# Patient Record
Sex: FEMALE | NOT HISPANIC OR LATINO | Employment: FULL TIME | URBAN - NONMETROPOLITAN AREA
[De-identification: names, ages, dates, MRNs, and addresses within clinical notes are randomized per-mention and may not be internally consistent; named-entity substitution may affect disease eponyms.]

---

## 2017-02-16 ENCOUNTER — COMMUNICATION - GICH (OUTPATIENT)
Dept: FAMILY MEDICINE | Facility: OTHER | Age: 52
End: 2017-02-16

## 2017-02-16 DIAGNOSIS — I10 ESSENTIAL (PRIMARY) HYPERTENSION: ICD-10-CM

## 2017-02-16 DIAGNOSIS — F41.1 GENERALIZED ANXIETY DISORDER: ICD-10-CM

## 2017-02-22 ENCOUNTER — COMMUNICATION - GICH (OUTPATIENT)
Dept: FAMILY MEDICINE | Facility: OTHER | Age: 52
End: 2017-02-22

## 2017-02-22 DIAGNOSIS — F17.219 CIGARETTE NICOTINE DEPENDENCE WITH NICOTINE-INDUCED DISORDER: ICD-10-CM

## 2017-05-27 ENCOUNTER — COMMUNICATION - GICH (OUTPATIENT)
Dept: FAMILY MEDICINE | Facility: OTHER | Age: 52
End: 2017-05-27

## 2017-05-27 DIAGNOSIS — F41.1 GENERALIZED ANXIETY DISORDER: ICD-10-CM

## 2017-05-27 DIAGNOSIS — I10 ESSENTIAL (PRIMARY) HYPERTENSION: ICD-10-CM

## 2017-05-30 ENCOUNTER — COMMUNICATION - GICH (OUTPATIENT)
Dept: FAMILY MEDICINE | Facility: OTHER | Age: 52
End: 2017-05-30

## 2017-05-30 DIAGNOSIS — I10 ESSENTIAL (PRIMARY) HYPERTENSION: ICD-10-CM

## 2017-06-22 ENCOUNTER — OFFICE VISIT - GICH (OUTPATIENT)
Dept: FAMILY MEDICINE | Facility: OTHER | Age: 52
End: 2017-06-22

## 2017-06-22 ENCOUNTER — HISTORY (OUTPATIENT)
Dept: FAMILY MEDICINE | Facility: OTHER | Age: 52
End: 2017-06-22

## 2017-06-22 DIAGNOSIS — R10.32 LEFT LOWER QUADRANT PAIN: ICD-10-CM

## 2017-06-22 DIAGNOSIS — Z00.00 ENCOUNTER FOR GENERAL ADULT MEDICAL EXAMINATION WITHOUT ABNORMAL FINDINGS: ICD-10-CM

## 2017-06-22 DIAGNOSIS — R10.2 PELVIC AND PERINEAL PAIN: ICD-10-CM

## 2017-06-22 DIAGNOSIS — F41.1 GENERALIZED ANXIETY DISORDER: ICD-10-CM

## 2017-06-22 DIAGNOSIS — F17.210 CIGARETTE NICOTINE DEPENDENCE, UNCOMPLICATED: ICD-10-CM

## 2017-06-22 DIAGNOSIS — I10 ESSENTIAL (PRIMARY) HYPERTENSION: ICD-10-CM

## 2017-06-22 LAB
A/G RATIO - HISTORICAL: 1.4 (ref 1–2)
ABSOLUTE BASOPHILS - HISTORICAL: 0.1 THOU/CU MM
ABSOLUTE EOSINOPHILS - HISTORICAL: 0.2 THOU/CU MM
ABSOLUTE IMMATURE GRANULOCYTES(METAS,MYELOS,PROS) - HISTORICAL: 0 THOU/CU MM
ABSOLUTE LYMPHOCYTES - HISTORICAL: 2.6 THOU/CU MM (ref 0.9–2.9)
ABSOLUTE MONOCYTES - HISTORICAL: 0.9 THOU/CU MM
ABSOLUTE NEUTROPHILS - HISTORICAL: 5.5 THOU/CU MM (ref 1.7–7)
ALBUMIN SERPL-MCNC: 4.7 G/DL (ref 3.5–5.7)
ALP SERPL-CCNC: 88 IU/L (ref 34–104)
ALT (SGPT) - HISTORICAL: 18 IU/L (ref 7–52)
ANION GAP - HISTORICAL: 8 (ref 5–18)
AST SERPL-CCNC: 16 IU/L (ref 13–39)
BASOPHILS # BLD AUTO: 0.6 %
BILIRUB SERPL-MCNC: 0.5 MG/DL (ref 0.3–1)
BUN SERPL-MCNC: 15 MG/DL (ref 7–25)
BUN/CREAT RATIO - HISTORICAL: 17
CALCIUM SERPL-MCNC: 10.3 MG/DL (ref 8.6–10.3)
CHLORIDE SERPLBLD-SCNC: 102 MMOL/L (ref 98–107)
CHOL/HDL RATIO - HISTORICAL: 4.69
CHOLESTEROL TOTAL: 239 MG/DL
CO2 SERPL-SCNC: 26 MMOL/L (ref 21–31)
CREAT SERPL-MCNC: 0.9 MG/DL (ref 0.7–1.3)
EOSINOPHIL NFR BLD AUTO: 1.9 %
ERYTHROCYTE [DISTWIDTH] IN BLOOD BY AUTOMATED COUNT: 13.2 % (ref 11.5–15.5)
GFR IF NOT AFRICAN AMERICAN - HISTORICAL: >60 ML/MIN/1.73M2
GLOBULIN - HISTORICAL: 3.3 G/DL (ref 2–3.7)
GLUCOSE SERPL-MCNC: 91 MG/DL (ref 70–105)
HCT VFR BLD AUTO: 45.6 % (ref 33–51)
HDLC SERPL-MCNC: 51 MG/DL (ref 23–92)
HEMOGLOBIN: 15.8 G/DL (ref 12–16)
IMMATURE GRANULOCYTES(METAS,MYELOS,PROS) - HISTORICAL: 0.2 %
LDLC SERPL CALC-MCNC: 163 MG/DL
LYMPHOCYTES NFR BLD AUTO: 27.9 % (ref 20–44)
MCH RBC QN AUTO: 30.7 PG (ref 26–34)
MCHC RBC AUTO-ENTMCNC: 34.6 G/DL (ref 32–36)
MCV RBC AUTO: 89 FL (ref 80–100)
MONOCYTES NFR BLD AUTO: 10.1 %
NEUTROPHILS NFR BLD AUTO: 59.3 % (ref 42–72)
NON-HDL CHOLESTEROL - HISTORICAL: 188 MG/DL
PATIENT STATUS - HISTORICAL: ABNORMAL
PLATELET # BLD AUTO: 368 THOU/CU MM (ref 140–440)
PMV BLD: 10 FL (ref 6.5–11)
POTASSIUM SERPL-SCNC: 5 MMOL/L (ref 3.5–5.1)
PROT SERPL-MCNC: 8 G/DL (ref 6.4–8.9)
RED BLOOD COUNT - HISTORICAL: 5.14 MIL/CU MM (ref 4–5.2)
SODIUM SERPL-SCNC: 136 MMOL/L (ref 133–143)
TRIGL SERPL-MCNC: 124 MG/DL
WHITE BLOOD COUNT - HISTORICAL: 9.4 THOU/CU MM (ref 4.5–11)

## 2017-06-26 ENCOUNTER — COMMUNICATION - GICH (OUTPATIENT)
Dept: SURGERY | Facility: OTHER | Age: 52
End: 2017-06-26

## 2017-06-26 DIAGNOSIS — Z12.11 ENCOUNTER FOR SCREENING FOR MALIGNANT NEOPLASM OF COLON: ICD-10-CM

## 2017-07-31 ENCOUNTER — SURGERY (OUTPATIENT)
Dept: SURGERY | Facility: OTHER | Age: 52
End: 2017-07-31

## 2017-08-21 ENCOUNTER — COMMUNICATION - GICH (OUTPATIENT)
Dept: FAMILY MEDICINE | Facility: OTHER | Age: 52
End: 2017-08-21

## 2017-08-21 DIAGNOSIS — F17.210 CIGARETTE NICOTINE DEPENDENCE, UNCOMPLICATED: ICD-10-CM

## 2017-12-28 NOTE — TELEPHONE ENCOUNTER
Patient Information     Patient Name MRJuana Lennon [id number] Female 1965      Telephone Encounter by Ellie Hamm RN at 2017  3:25 PM     Author:  Ellie Hamm RN Service:  (none) Author Type:  NURS- Registered Nurse     Filed:  2017  3:26 PM Encounter Date:  2017 Status:  Signed     :  Ellie Hamm RN (NURS- Registered Nurse)            Left message to call back  ....................Ellie Hamm RN  2017   3:26 PM

## 2017-12-28 NOTE — TELEPHONE ENCOUNTER
Patient Information     Patient Name MRJuana Lennon [id number] Female 1965      Telephone Encounter by Ellie Hamm RN at 2017  3:54 PM     Author:  Ellie Hamm RN Service:  (none) Author Type:  NURS- Registered Nurse     Filed:  2017  3:56 PM Encounter Date:  2017 Status:  Signed     :  Ellie Hamm RN (NURS- Registered Nurse)            Attempted to reach patient multiple times without a response. Refills denied until patient calls or seen.    Unable to complete prescription refill per RN Medication Refill Policy.................... Ellie Hamm RN ....................  2017   3:55 PM

## 2017-12-28 NOTE — TELEPHONE ENCOUNTER
Patient Information     Patient Name MRJuana Lennon [id number] Female 1965      Telephone Encounter by Lizbeth Eller RN at 2017  7:55 AM     Author:  Lizbeth Eller RN Service:  (none) Author Type:  NURS- Registered Nurse     Filed:  2017  7:58 AM Encounter Date:  2017 Status:  Signed     :  Lizbeth Eller RN (NURS- Registered Nurse)            This is a Refill request from: mike  Name of Medication: CHANTIX STARTING MONTH BOX 0.5 mg (11)- 1 mg (42) tablet  TAKE AS DIRECTED  Quantity requested: 53  Last fill date: 17  Due for refill: yes will need RX for continuing pack  Last visit with JOSEFINA CASTILLO was on: 2017 in [facility]  PCP:  Josefina Castillo MD  Controlled Substance Agreement:  na   Diagnosis r/t this medication request: smoking cessation    Will need rx for chantix continuing pack T-up for provider     Unable to complete prescription refill per RN Medication Refill Policy.................... LIZBETH ELLER RN ....................  2017   7:55 AM

## 2017-12-28 NOTE — PROGRESS NOTES
Patient Information     Patient Name MRJuana Lennon [id number] Female 1965      Progress Notes by Josefina Castillo MD at 2017  8:45 AM     Author:  Josefina Castillo MD Service:  (none) Author Type:  Physician     Filed:  2017  8:58 AM Encounter Date:  2017 Status:  Signed     :  Josefina Castillo MD (Physician)            Nursing Notes:   Winnie Cochran  2017 10:03 AM  Signed  Patient presents for yearly physical and med renewal had quit smoking on chantix and when her brother got sick she started back again. She would like a renewal of chantix.   Winnie Cochran LPN........................2017  8:47 AM       SUBJECTIVE:    Juana Bains is a 52 y.o. female who presents for annual physical examination.     HPI: Patient is a   5Para  3 Here for annual GYN examination No LMP recorded. Patient has had a hysterectomy..   She had pelvic pain last year but did not keep her ultrasound appointment.  She continues to have pelvic pain intermittently with bloating.    She is in a monogamous relationship.  Declines sexually transmitted disease testing.  No vaginal complaints.  No breast complaints.   No family history of breast or ovarian cancer.      HYPERTENSION   on Zestoretic 20/25 mg.    ROS: denies any changes in hearing/ vision;   Denies chest pain, palpitations, shortness of breath or dyspnea on exertion.  Denies any pedal edema.               Generalized anxiety disorder    On Cymbalta 60 mg a day    CARMINA-7 Anxiety Screening     Over the last 2 weeks, how often have you been bothered by the following problems:             PHQ Depression Screen  Date of PHQ exam: 17  Over the last 2 weeks, how often have you been bothered by any of the following problems?  1. Little interest or pleasure in doing things: 0 - Not at all  2. Feeling down, depressed, or hopeless: 0 - Not at all               Cigarette nicotine  dependence without complication  patient quit smoking on Chantix but then her brother  and stress caused her to resume nicotine use. She is not in smoking cessation.       HCM  Patient has family history of Abernathy's polyposis;  She has tested negative for the gene.     Denies changes in bowel or bladder or indigestion.    She had colonoscopy  which was normal. She denies any blood in stools.    Wt Readings from Last 3 Encounters:    17 71.2 kg (157 lb)   16 74.7 kg (164 lb 9.6 oz)   16 73.4 kg (161 lb 12.8 oz)          ALLERGIES:  Morphine and Paxil [paroxetine]     Immunization History:  Immunization History     Administered  Date(s) Administered     Tdap 2009        CURRENT MEDICATIONS:   Current Outpatient Prescriptions       Medication  Sig Dispense Refill     DULoxetine (CYMBALTA) 60 mg Delayed-release capsule TAKE 1 CAPSULE BY MOUTH ONCE DAILY 90 capsule 0     lisinopril-hydrochlorothiazide, 20-25 mg, (PRINZIDE, ZESTORETIC) 20-25 mg per tablet TAKE 1 TABLET BY MOUTH ONCE DAILY 90 tablet 0     No current facility-administered medications for this visit.      Medications have been reviewed by me and are current to the best of my knowledge and ability.    PROBLEM LIST:  Patient Active Problem List     Diagnosis  Code     HYPERKERATOSIS CALLUS L84     HYPERTENSION I10     NICOTINE ADDICTION F17.200     BUNIONS, BILATERAL M21.619     Chronic tension headaches G44.229     Generalized anxiety disorder F41.1       PAST MEDICAL HISTORY:  Past Medical History:     Diagnosis  Date     Depression     Depression/Anxiety diagnosed, PHQ- 9 score on 08 - PHQ-9 score is 2.  Declines counseling.        Dermatitis     Dermatitis of the ear canals      External otitis      H/O abnormal Pap smear     Previous abnormal Paps      Hallux valgus, acquired, bilateral     Bilateral hallux valgus      Hx of pregnancy     2 C-Sections/G5, P2, 2 ectopic and 1 miscarriage      SURGICAL  HISTORY:  Past Surgical History:      Procedure  Laterality Date      SECTION      2 C-Sections, Two ectopic pregnancies; with one the left tube removed       COLONOSCOPY SCREENING       within normal limits.         ENDOMETRIAL ABLATION      Status post ablation.        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 Carter        TOTAL ABDOMINAL HYSTERECTOMY  2009    Total abdominal hysterectomy by Dr. Guzman       TUBAL LIGATION      Tubal ligation       US PELVIS COMPLETE TA      Last pelvic ultrasound       History     Smoking Status       Current Every Day Smoker      Packs/day: 0.50     Years: 15.00     Types: Cigarettes   Smokeless Tobacco       Never Used        SOCIAL HISTORY:  Social History     Social History        Marital status:       Spouse name: N/A     Number of children:  N/A     Years of education:  N/A     Occupational History      Not on file.     Social History Main Topics         Smoking status:   Current Every Day Smoker     Packs/day:  0.50     Years:  15.00     Types:  Cigarettes     Smokeless tobacco:   Never Used     Alcohol use   No      Comment: rare      Drug use:   No     Sexual activity:   Not on file     Other Topics   Concern      Service  No     Blood Transfusions  Yes     remote with ectopic pregnancy       Caffeine Concern  No     Occupational Exposure  No     Hobby Hazards  No     Sleep Concern  No     Stress Concern  Yes     brother, children, grandchildren       Weight Concern  No     Special Diet  No     Back Care  No     Exercise  Yes     working on it      Seat Belt  Yes     100%      Self-Exams  No     Social History Narrative     She is remarried.  New 's name is Cecil Farrell.    She is a .      Children Age 14 and 10    Patient currently smokes.     one daughter pregnant             Preload - 2012     FAMILY HISTORY:  Family History      "  Problem   Relation Age of Onset     Other  Other      Family history of Abernathy's polyposis syndrome, negative genetic testing~Father Abernathy's polyposis syndrome with father dying early of colon cancer.   at age 41 of Abernathy's polyposis with resulting colon cancer.~Brother Two brothers with surgery fo*       Diabetes  Maternal Grandmother      FHMaternal diabetes in the family       Cancer  Father      Abernathy's polyposis syndrome with father dying early of colon cancer.   at age 41 of Abernathy's polyposis with resulting colon cancer       Cancer  Brother      Two brothers with surgery for the above and positive genetic testing.  One has had almost the entire colon removed.       Cancer  Sister      Negative for carrying the genetic marker of Abernathy's polyposis.       Other  Brother      Younger brother has not been tested       Good Health  Mother      Other  Brother      One with no symptoms         REVIEW OF SYSTEMS:    ROS: No TIA's or dysphagia. No prolonged cough. No dyspnea or chest pain on exertion.  No abdominal pain, change in bowel habits, black or bloody stools.  No urinary tract symptoms. She is post hysterectomy. No abnormal vaginal bleeding, discharge or unexpected pelvic pain. No new breast lumps, breast pain or nipple discharge.      EXAM:   /78  Pulse 80  Ht 1.57 m (5' 1.81\")  Wt 71.2 kg (157 lb)  Breastfeeding? No  BMI 28.89 kg/m2       General Appearance: Normal., Pleasant, alert, appropriate appearance for age. No acute distress,  Psych-alert, oriented, and appropriate affect  HEENT-within normal limits   Neck Exam: Normal., Supple, no masses or nodes.  Thyroid Exam: No nodules or enlargement., normal to palpation  Lungs:  Clear to auscultation.  Cardiovascular Exam: Regular rate and rhythm. S1, S2, no murmur, click, gallop, or rubs.  Breast Exam: Normal., No dimpling, bilateral chronic nipple retraction or discharge. No masses or nodes. Self breast exam reviewed and " taught   Gastrointestinal Exam: Soft, nontender, no abnormal masses or organomegaly.    Genitourinary Exam Female:   External Genitalia: normal hair distribution, EG/BUS  Vaginal exam: normal pink mucosa without prolapse or lesions   BME: normal size uterus, no adnexal masses.  Skin: no concerning moles, rashes, or lesions  Extremities:  NT, no edema       Results for orders placed or performed in visit on 06/22/17      LIPID PANEL      Result  Value Ref Range    CHOLESTEROL,TOTAL 239 (H) <200 mg/dL    TRIGLYCERIDES 124 <150 mg/dL    HDL CHOLESTEROL 51 23 - 92 mg/dL    NON-HDL CHOLESTEROL 188 (H) <145 mg/dl    CHOL/HDL RATIO            4.69 (H) <4.50                    LDL CHOLESTEROL 163 (H) <100 mg/dL    PATIENT STATUS            FASTING                   COMP METABOLIC PANEL      Result  Value Ref Range    SODIUM 136 133 - 143 mmol/L    POTASSIUM 5.0 3.5 - 5.1 mmol/L    CHLORIDE 102 98 - 107 mmol/L    CO2,TOTAL 26 21 - 31 mmol/L    ANION GAP 8 5 - 18                    GLUCOSE 91 70 - 105 mg/dL    CALCIUM 10.3 8.6 - 10.3 mg/dL    BUN 15 7 - 25 mg/dL    CREATININE 0.90 0.70 - 1.30 mg/dL    BUN/CREAT RATIO           17                    GFR if African American >60 >60 ml/min/1.73m2    GFR if not African American >60 >60 ml/min/1.73m2    ALBUMIN 4.7 3.5 - 5.7 g/dL    PROTEIN,TOTAL 8.0 6.4 - 8.9 g/dL    GLOBULIN                  3.3 2.0 - 3.7 g/dL    A/G RATIO 1.4 1.0 - 2.0                    BILIRUBIN,TOTAL 0.5 0.3 - 1.0 mg/dL    ALK PHOSPHATASE 88 34 - 104 IU/L    ALT (SGPT) 18 7 - 52 IU/L    AST (SGOT) 16 13 - 39 IU/L   CBC WITH AUTO DIFFERENTIAL      Result  Value Ref Range    WHITE BLOOD COUNT         9.4 4.5 - 11.0 thou/cu mm    RED BLOOD COUNT           5.14 4.00 - 5.20 mil/cu mm    HEMOGLOBIN                15.8 12.0 - 16.0 g/dL    HEMATOCRIT                45.6 33.0 - 51.0 %    MCV                       89 80 - 100 fL    MCH                       30.7 26.0 - 34.0 pg    MCHC                      34.6 32.0 -  36.0 g/dL    RDW                       13.2 11.5 - 15.5 %    PLATELET COUNT            368 140 - 440 thou/cu mm    MPV                       10.0 6.5 - 11.0 fL    NEUTROPHILS               59.3 42.0 - 72.0 %    LYMPHOCYTES               27.9 20.0 - 44.0 %    MONOCYTES                 10.1 <12.0 %    EOSINOPHILS               1.9 <8.0 %    BASOPHILS                 0.6 <3.0 %    IMMATURE GRANULOCYTES(METAS,MYELOS,PROS) 0.2 %    ABSOLUTE NEUTROPHILS      5.5 1.7 - 7.0 thou/cu mm    ABSOLUTE LYMPHOCYTES      2.6 0.9 - 2.9 thou/cu mm    ABSOLUTE MONOCYTES        0.9 (H) <0.9 thou/cu mm    ABSOLUTE EOSINOPHILS      0.2 <0.5 thou/cu mm    ABSOLUTE BASOPHILS        0.1 <0.3 thou/cu mm    ABSOLUTE IMMATURE GRANULOCYTES(METAS,MYELOS,PROS) 0.0 <=0.3 thou/cu mm         ASSESSMENT/PLAN    ICD-10-CM    1. Routine physical examination Z00.00 XR MAMMO BILAT SCREENING      COLONOSCOPY SCREENING   2. HYPERTENSION I10 lisinopril-hydrochlorothiazide, 20-25 mg, (PRINZIDE, ZESTORETIC) 20-25 mg per tablet      LIPID PANEL      COMP METABOLIC PANEL      CBC AND DIFFERENTIAL      LIPID PANEL      COMP METABOLIC PANEL      CBC AND DIFFERENTIAL      CBC WITH AUTO DIFFERENTIAL   3. Generalized anxiety disorder F41.1 DULoxetine (CYMBALTA) 60 mg Delayed-release capsule   4. Cigarette nicotine dependence without complication F17.210 varenicline (CHANTIX) 0.5 mg (11)- 1 mg (42) tablet   5. Pelvic pain R10.2 US PELVIS COMPLETE TA AND TV   6. Intermittent left lower quadrant abdominal pain R10.32 COLONOSCOPY SCREENING     Proceed with ultrasound  Discussed with patient increased morbidity and mortality / death associated with nicotine use.   Reviewed a variety of OTC nicotine replacement products available, identifying triggers and developing a strategy for successful nicotine cessation.     Check blood pressure twice a month;  Goal under 130/80  Ms. Herson's Body mass index is 28.89 kg/(m^2). This is out of the normal range for a 52 y.o.  Normal range for ages 18+ is between 18.5 and 24.9. To lose weight we reviewed risks and benefits of appropriate options such as diet, exercise, and medications. Patient's strategy will be  self-directed nutrition plan and self-directed exercise program    BMI reviewed and discussed with patient.      Patient Instructions   Labs will be placed in your [employer] account  Work on following  a mediterranean diet; Use monosaturated fats ( olive , canola and peanut   oil; nuts, avocados), abundance of plant foods which are minimally processed, fish (salmon).  Exercise 30 minutes every day  GI staff will call you with your mammogram appointment.   Try to get 7-8 hours sleep each night.  Rest is important!      Recommend return to clinic to discuss use of statin as patient could not wait for results    chantix Rx provided       Work on smoking cessation. Handouts as below. Identify triggers to smoking.  Consider over-the-counter nicotine patch or reading Toñito Georges's , ' Easy way to Stop Smoking'.        Blood pressure med refilled

## 2017-12-28 NOTE — TELEPHONE ENCOUNTER
Patient Information     Patient Name MRN Juana Pierson [id number] Female 1965      Telephone Encounter by Fay Andres at 2017 10:36 AM     Author:  Fay Andres Service:  (none) Author Type:  (none)     Filed:  2017 10:42 AM Encounter Date:  2017 Status:  Signed     :  Fay Andres            Screening Questions for the Scheduling of Screening Colonoscopies   (If Colonoscopy is diagnostic, Provider should review the chart before scheduling.)  Are you younger than 50 or older than 80?  NO   Do you take aspirin or fish oil?  NO (if yes, tell patient to stop 1 week prior to Colonoscopy)  Do you take warfarin (Coumadin), clopidogrel (Plavix), apixaban (Eliquis), dabigatram (Pradaxa), rivaroxaban (Xarelto) or any blood thinner? NO  Do you use oxygen at home?  NO  Do you have kidney disease? NO  Are you on dialysis? NO  Have you had a stroke or heart attack in the last year? NO  Have you had a stent in your heart or any blood vessel in the last year? NO  Have you had a transplant of any organ? NO  Have you had a colonoscopy or upper endoscopy (EGD) before? YES          When?   ?  -  GI   Date of scheduled Colonoscopy. 2017  Provider JAVON CARPIO

## 2017-12-28 NOTE — PATIENT INSTRUCTIONS
Patient Information     Patient Name MRJuana Lennon [id number] Female 1965      Patient Instructions by Josefina Castillo MD at 2017  8:45 AM     Author:  Josefina Castillo MD Service:  (none) Author Type:  Physician     Filed:  2017  8:52 AM Encounter Date:  2017 Status:  Signed     :  Josefina Castillo MD (Physician)            Labs will be placed in your [employer] account  Work on following  a mediterranean diet; Use monosaturated fats ( olive , canola and peanut   oil; nuts, avocados), abundance of plant foods which are minimally processed, fish (salmon).  Exercise 30 minutes every day  [facility] staff will call you with your mammogram appointment.   Try to get 7-8 hours sleep each night.  Rest is important!      Recommend return to clinic to discuss use of statin as patient could not wait for results    chantix Rx provided       Work on smoking cessation. Handouts as below. Identify triggers to smoking.  Consider over-the-counter nicotine patch or reading Toñito Georges's , ' Easy way to Stop Smoking'.        Blood pressure med refilled

## 2017-12-30 NOTE — NURSING NOTE
Patient Information     Patient Name Juana Espinoza [id number] Female 1965      Nursing Note by Winnie Cochran at 2017  8:45 AM     Author:  Winnie Cochran Service:  (none) Author Type:  (none)     Filed:  2017 10:03 AM Encounter Date:  2017 Status:  Signed     :  Winnie Cochran            Patient presents for yearly physical and med renewal had quit smoking on chantix and when her brother got sick she started back again. She would like a renewal of chantix.   Winnie Cochran LPN........................2017  8:47 AM

## 2018-01-03 NOTE — TELEPHONE ENCOUNTER
Patient Information     Patient Name MRJuana Lennon [id number] Female 1965      Telephone Encounter by Chloe Hou at 2017  8:47 AM     Author:  Chloe Hou Service:  (none) Author Type:  NURS- Registered Nurse     Filed:  2017  8:52 AM Encounter Date:  2017 Status:  Signed     :  Chloe Hou (NURS- Registered Nurse)            Depression-in adults 18 and over  Serotonin/Norepinephrine Reuptake Inhibitors    Office visit in the past 12 months or as indicated in chart.  Should have clinic visit 1-2 months after initial prescription.    Last visit with SHELBI GREEN was on: 2016 in [facility]  Next visit with SHELBI GREEN is on: No future appointment listed with this provider  Next visit with Family Practice is on: No future appointment listed in this department    Max refills 12 months from last office visit or per providers notes.    PHQ Depression Screening 2016   Date of PHQ exam (doc flow) 2016   1. Lack of interest/pleasure 0 - Not at all   2. Feeling down/depressed 0 - Not at all   PHQ-2 TOTAL SCORE 0   3. Trouble sleeping 0 - Not at all   4. Decreased energy 1 - Several days   5. Appetite change 0 - Not at all   6. Feelings of failure 0 - Not at all   7. Trouble concentrating 0 - Not at all   8. Activity level 0 - Not at all   9. Hurting yourself 0 - Not at all   PHQ-9 TOTAL SCORE 1   PHQ-9 Severity Level none   Functional Impairment not difficult at all       Diuretic Combinations    Office visit in the past 12 months or per provider note.    Last visit with SHELBI GREEN was on: 2016 in [facility]  Next visit with SHELBI GREEN is on: No future appointment listed with this provider  Next visit with Family Practice is on: No future appointment listed in this department    Lab test requirements:  Creatinine and Potassium annually, if ordering lab, order  BMP.  CREATININE (mg/dL)    Date Value   02/23/2016 0.79     POTASSIUM (mmol/L)    Date Value   02/23/2016 4.0       Max refill for 12 months from last office visit or per provider note.    Patient is due for medication management appointment. Limited refill provided at this time and letter sent for reminder to patient. Prescription refilled per RN Medication Refill Policy.................... Chloe Hou RN ....................  2/16/2017   8:49 AM

## 2018-01-03 NOTE — TELEPHONE ENCOUNTER
Patient Information     Patient Name MRJuana Lennon [id number] Female 1965      Telephone Encounter by Ellie Hamm RN at 2017  4:23 PM     Author:  Ellie Hamm RN Service:  (none) Author Type:  (none)     Filed:  2017  4:24 PM Encounter Date:  2017 Status:  Signed     :  Ellie Hamm RN (NURS- Registered Nurse)            Smoking Cessation    Office visit in the past 12 months or per provider note.    Last visit with SHELBI GREEN was on: 2016 in [facility]  Next visit with SHELBI GREEN is on: No future appointment listed with this provider  Next visit with Family Practice is on: No future appointment listed in this department    If off med entirely, refer to provider.  Max refill for 24 weeks from last office visit or per provider note.    Medication is not currently on list - patient is due for annual medication review.    Unable to complete prescription refill per RN Medication Refill Policy.................... Ellie Hamm ....................  2017   4:24 PM

## 2018-01-18 PROBLEM — F17.200 NICOTINE ADDICTION: Status: ACTIVE | Noted: 2018-01-18

## 2018-01-18 PROBLEM — L84 CORNS AND CALLOSITIES: Status: ACTIVE | Noted: 2018-01-18

## 2018-01-18 PROBLEM — I10 HYPERTENSION: Status: ACTIVE | Noted: 2018-01-18

## 2018-01-18 RX ORDER — DULOXETIN HYDROCHLORIDE 60 MG/1
60 CAPSULE, DELAYED RELEASE ORAL DAILY
COMMUNITY
Start: 2017-06-22 | End: 2018-07-15

## 2018-01-18 RX ORDER — VARENICLINE TARTRATE 1 MG/1
1 TABLET, FILM COATED ORAL
COMMUNITY
Start: 2017-08-23 | End: 2018-10-29

## 2018-01-18 RX ORDER — POLYETHYLENE GLYCOL 3350, SODIUM CHLORIDE, SODIUM BICARBONATE, POTASSIUM CHLORIDE 420; 11.2; 5.72; 1.48 G/4L; G/4L; G/4L; G/4L
240 POWDER, FOR SOLUTION ORAL
COMMUNITY
Start: 2017-06-26 | End: 2018-10-29

## 2018-01-18 RX ORDER — LISINOPRIL AND HYDROCHLOROTHIAZIDE 20; 25 MG/1; MG/1
1 TABLET ORAL DAILY
COMMUNITY
Start: 2017-06-22 | End: 2018-08-03

## 2018-01-27 VITALS
WEIGHT: 157 LBS | HEART RATE: 80 BPM | HEIGHT: 62 IN | BODY MASS INDEX: 28.89 KG/M2 | SYSTOLIC BLOOD PRESSURE: 120 MMHG | DIASTOLIC BLOOD PRESSURE: 78 MMHG

## 2018-02-06 ENCOUNTER — DOCUMENTATION ONLY (OUTPATIENT)
Dept: FAMILY MEDICINE | Facility: OTHER | Age: 53
End: 2018-02-06

## 2018-03-24 ENCOUNTER — HEALTH MAINTENANCE LETTER (OUTPATIENT)
Age: 53
End: 2018-03-24

## 2018-06-16 ENCOUNTER — OFFICE VISIT (OUTPATIENT)
Dept: FAMILY MEDICINE | Facility: OTHER | Age: 53
End: 2018-06-16
Attending: PHYSICIAN ASSISTANT
Payer: COMMERCIAL

## 2018-06-16 VITALS
HEART RATE: 104 BPM | WEIGHT: 164 LBS | BODY MASS INDEX: 28 KG/M2 | HEIGHT: 64 IN | DIASTOLIC BLOOD PRESSURE: 78 MMHG | TEMPERATURE: 97.8 F | SYSTOLIC BLOOD PRESSURE: 144 MMHG

## 2018-06-16 DIAGNOSIS — J30.2 ACUTE SEASONAL ALLERGIC RHINITIS, UNSPECIFIED TRIGGER: ICD-10-CM

## 2018-06-16 DIAGNOSIS — J01.90 ACUTE SINUSITIS WITH SYMPTOMS > 10 DAYS: Primary | ICD-10-CM

## 2018-06-16 PROCEDURE — 99213 OFFICE O/P EST LOW 20 MIN: CPT | Performed by: PHYSICIAN ASSISTANT

## 2018-06-16 RX ORDER — FLUTICASONE PROPIONATE 50 MCG
2 SPRAY, SUSPENSION (ML) NASAL DAILY
Qty: 1 BOTTLE | Refills: 0 | Status: SHIPPED | OUTPATIENT
Start: 2018-06-16 | End: 2019-12-03

## 2018-06-16 ASSESSMENT — ENCOUNTER SYMPTOMS
EYE REDNESS: 0
NAUSEA: 0
EYE ITCHING: 0
EYE PAIN: 0
APPETITE CHANGE: 0
FATIGUE: 1
VOMITING: 0
ACTIVITY CHANGE: 0
SORE THROAT: 0
CHILLS: 0
FEVER: 0
RHINORRHEA: 0
SINUS PAIN: 1
COUGH: 1
SINUS PRESSURE: 1

## 2018-06-16 NOTE — PROGRESS NOTES
SUBJECTIVE:   Juana Farrell is a 52 year old female presenting with a chief complaint of   Chief Complaint   Patient presents with     Sinus Problem     Nursing Notes:   Darshana Mercado CMA  6/16/2018  3:51 PM  Signed  Patient present to clinic today with a possible sinus infection. Facial pain and pressure. Post nasal drainage, roof of mouth pressure/pain. Bad taste in mouth. Sx x 2 weeks.  Darshana Mercado CMA..............6/16/2018........3:36 PM      HPI:  Juana is here with complaints of worsening sinus pain and pressure for 2 weeks. She has had one day when it seemed to be getting better but then it returned with worse symptoms again.    She has pressure in the ethmoid and maxillary sinuses and in the roof of the mouth.  She had some aching tooth pressure. She has postnasal drip with resultant cough. No fevers.  She has been taking Claritin D as she does have seasonal allergies and this has been a particularly bad year for her in this regard. She also takes Tylenol every day in the last 2 weeks for her sinus headache.      Review of Systems   Constitutional: Positive for fatigue. Negative for activity change, appetite change, chills and fever.   HENT: Positive for congestion, ear discharge, ear pain, postnasal drip, sinus pain and sinus pressure. Negative for dental problem, rhinorrhea, sneezing and sore throat.         Her ears are itchy and she sometimes has white drainage on her pillow in the morning. She has not been swimming and tried not to get water in her ears when showering. No pain in the ears.    Eyes: Negative for pain, redness and itching.   Respiratory: Positive for cough.         Minimal cough from post nasal drip.   Cardiovascular: Negative for chest pain.   Gastrointestinal: Negative for nausea and vomiting.       Past Medical History:   Diagnosis Date     Acquired hallux valgus of left foot     Bilateral hallux valgus     Dermatitis     Dermatitis of the ear canals     Major  "depressive disorder, single episode     ,Depression/Anxiety diagnosed, PHQ- 9 score on 08 - PHQ-9 score is 2.  Declines counseling.     Otitis externa     No Comments Provided     Personal history of other medical treatment (CODE)     2 C-Sections/G5, P2, 2 ectopic and 1 miscarriage     Personal history of other specified conditions (CODE)     Previous abnormal Paps     Family History   Problem Relation Age of Onset     CANCER Father      Cancer,Abernathy's polyposis syndrome with father dying early of colon cancer.   at age 41 of Abernathy's polyposis with resulting colon cancer     Family History Negative Mother      Good Health     Other - See Comments Other FHx     Family history of Abernathy's polyposis syndrome, negative genetic testing     DIABETES Maternal Grandmother      Diabetes,FHMaternal diabetes in the family     CANCER Brother      Cancer,Two brothers with surgery for the above and positive genetic testing.  One has had almost the entire colon removed.     CANCER Sister      Cancer,Negative for carrying the genetic marker of Abernathy's polyposis.     Other - See Comments Brother      Younger brother has not been tested     Other - See Comments Brother      One with no symptoms     Medications:  cymbalta  Lisinopril with HCTZ    Allergies   Allergen Reactions     Morphine Hives     Itchy rash at time of birth     Paroxetine Nausea       Social History   Substance Use Topics     Smoking status: Current Every Day Smoker     Packs/day: 0.50     Years: 15.00     Types: Cigarettes     Smokeless tobacco: Never Used     Alcohol use No      Comment: Alcoholic Drinks/day: rare   She is decreasing smoking, down to 1/2 pack per day, down from a pack a day.    She has a Chantix prescription ready and is going to try it again. It did work.           OBJECTIVE  /78  Pulse 104  Temp 97.8  F (36.6  C) (Tympanic)  Ht 5' 3.5\" (1.613 m)  Wt 164 lb (74.4 kg)  BMI 28.6 kg/m2    Physical Exam "   Constitutional: She appears well-developed and well-nourished. No distress.   HENT:   Right Ear: External ear normal.   Left Ear: External ear normal.   Mouth/Throat: Oropharynx is clear and moist.   TMs intact and gray. Nose congested.  Tender maxillary and ethmoid sinuses.  Dentition good.   Eyes: Conjunctivae and EOM are normal. Pupils are equal, round, and reactive to light. Right eye exhibits no discharge. Left eye exhibits no discharge.   Neck: Normal range of motion.   Cardiovascular: Normal rate, regular rhythm and normal heart sounds.    No murmur heard.  Pulmonary/Chest: Effort normal and breath sounds normal. No respiratory distress.   Lymphadenopathy:     She has no cervical adenopathy.       Labs:  No results found for this or any previous visit (from the past 24 hour(s)).      ASSESSMENT:      ICD-10-CM    1. Acute sinusitis with symptoms > 10 days J01.90 amoxicillin-clavulanate (AUGMENTIN) 875-125 MG per tablet     fluticasone (FLONASE) 50 MCG/ACT spray   2. Acute seasonal allergic rhinitis, unspecified trigger J30.2         PLAN:  Due to worsening symptoms that have persisted for more than 2 week, suspect bacterial infection. Will treat with Augmentin.  Increase oral fluids. Can try Mucinex. Recommended taking plain claritin without the decongestant. Add flonase.  Try steam treatments or saline rinse.  Follow up if this persists more than an additional week, or sooner if much worse/ high fever/ etc.  We did discuss smoking cessation and she plans to work on this again.     María Carey PA-C on 6/16/2018 at 5:49 PM          Patient Instructions     Try hydrocortisone cream 1% (over the counter) in the ear canal for the itching.  Treating your allergies with Flonase or oral antihistamine (claritin) may also help with the ear itching and drainage.        Acute Bacterial Rhinosinusitis (ABRS)    Acute bacterial rhinosinusitis (ABRS) is an infection of your nasal cavity and sinuses. It s caused by  bacteria. Acute means that you ve had symptoms for less than 4 weeks, but possibly up to 12 weeks.  Understanding your sinuses  The nasal cavity is the large air-filled space behind your nose. The sinuses are a group of spaces formed by the bones of your face. They connect with your nasal cavity. ABRS causes the tissue lining these spaces to become inflamed. Mucus may not drain normally. This leads to facial pain and other symptoms.  What causes ABRS?  ABRS most often follows an upper respiratory infection caused by a virus. Bacteria then infect the lining of your nasal cavity and sinuses. But you can also get ABRS if you have:    Nasal allergies    Long-term nasal swelling and congestion not caused by allergies    Blockage in the nose  Symptoms of ABRS  The symptoms of ABRS may be different for each person and include:    Nasal congestion or blockage    Pain or pressure in the face    Thick, colored drainage from the nose  Other symptoms may include:    Runny nose    Fluid draining from the nose down the throat (postnasal drip)    Headache    Cough    Pain    Fever  Diagnosing ABRS  ABRS may be diagnosed if you ve had an upper respiratory infection like a cold and cough for 10 or more days without improvement or with worsening symptoms. Your healthcare provider will ask about your symptoms and your medical history. The provider will check your vital signs, including your temperature. You ll have a physical exam. The healthcare provider will check your ears, nose, and throat. You likely won t need any tests. If ABRS comes back, you may have a culture or other tests.  Treatment for ABRS  Treatment may include:    Antibiotic medicine. This is for symptoms that last for at least 10 to 14 days.    Nasal corticosteroid medicine. Drops or spray used in the nose can lessen swelling and congestion.    Over-the-counter pain medicine. This is to lessen sinus pain and pressure.    Nasal decongestant medicine. Spray or drops  may help to lessen congestion. Do not use them for more than a few days.    Salt wash (saline irrigation). This can help to loosen mucus.  Possible complications of ABRS  ABRS may come back or become long-term (chronic). In rare cases, ABRS may cause complications such as:     Inflamed tissue around the brain and spinal cord (meningitis)    Inflamed tissue around the eyes (orbital cellulitis)    Inflamed bones around the sinuses (osteitis)  These problems may need to be treated in a hospital with intravenous (IV) antibiotic medicine or surgery.  When to call the healthcare provider  Call your healthcare provider if you have any of the following:    Symptoms that don t get better, or get worse    Symptoms that don t get better after 3 to 5 days on antibiotics    Trouble seeing    Swelling around your eyes    Confusion or trouble staying awake   Date Last Reviewed: 5/1/2017 2000-2017 The [employer]. [address] [place] [id number]. All rights reserved. This information is not intended as a substitute for professional medical care. Always follow your healthcare professional's instructions.

## 2018-06-16 NOTE — PATIENT INSTRUCTIONS
Try hydrocortisone cream 1% (over the counter) in the ear canal for the itching.  Treating your allergies with Flonase or oral antihistamine (claritin) may also help with the ear itching and drainage.        Acute Bacterial Rhinosinusitis (ABRS)    Acute bacterial rhinosinusitis (ABRS) is an infection of your nasal cavity and sinuses. It s caused by bacteria. Acute means that you ve had symptoms for less than 4 weeks, but possibly up to 12 weeks.  Understanding your sinuses  The nasal cavity is the large air-filled space behind your nose. The sinuses are a group of spaces formed by the bones of your face. They connect with your nasal cavity. ABRS causes the tissue lining these spaces to become inflamed. Mucus may not drain normally. This leads to facial pain and other symptoms.  What causes ABRS?  ABRS most often follows an upper respiratory infection caused by a virus. Bacteria then infect the lining of your nasal cavity and sinuses. But you can also get ABRS if you have:    Nasal allergies    Long-term nasal swelling and congestion not caused by allergies    Blockage in the nose  Symptoms of ABRS  The symptoms of ABRS may be different for each person and include:    Nasal congestion or blockage    Pain or pressure in the face    Thick, colored drainage from the nose  Other symptoms may include:    Runny nose    Fluid draining from the nose down the throat (postnasal drip)    Headache    Cough    Pain    Fever  Diagnosing ABRS  ABRS may be diagnosed if you ve had an upper respiratory infection like a cold and cough for 10 or more days without improvement or with worsening symptoms. Your healthcare provider will ask about your symptoms and your medical history. The provider will check your vital signs, including your temperature. You ll have a physical exam. The healthcare provider will check your ears, nose, and throat. You likely won t need any tests. If ABRS comes back, you may have a culture or other  tests.  Treatment for ABRS  Treatment may include:    Antibiotic medicine. This is for symptoms that last for at least 10 to 14 days.    Nasal corticosteroid medicine. Drops or spray used in the nose can lessen swelling and congestion.    Over-the-counter pain medicine. This is to lessen sinus pain and pressure.    Nasal decongestant medicine. Spray or drops may help to lessen congestion. Do not use them for more than a few days.    Salt wash (saline irrigation). This can help to loosen mucus.  Possible complications of ABRS  ABRS may come back or become long-term (chronic). In rare cases, ABRS may cause complications such as:     Inflamed tissue around the brain and spinal cord (meningitis)    Inflamed tissue around the eyes (orbital cellulitis)    Inflamed bones around the sinuses (osteitis)  These problems may need to be treated in a hospital with intravenous (IV) antibiotic medicine or surgery.  When to call the healthcare provider  Call your healthcare provider if you have any of the following:    Symptoms that don t get better, or get worse    Symptoms that don t get better after 3 to 5 days on antibiotics    Trouble seeing    Swelling around your eyes    Confusion or trouble staying awake   Date Last Reviewed: 5/1/2017 2000-2017 [employer]. [address] [place] [address], [place] [address]. All rights reserved. This information is not intended as a substitute for professional medical care. Always follow your healthcare professional's instructions.

## 2018-06-16 NOTE — NURSING NOTE
Patient present to clinic today with a possible sinus infection. Facial pain and pressure. Post nasal drainage, roof of mouth pressure/pain. Bad taste in mouth. Sx x 2 weeks.  Darshana Mercado CMA..............6/16/2018........3:36 PM

## 2018-06-16 NOTE — MR AVS SNAPSHOT
After Visit Summary   6/16/2018    Juana Farrell    MRN: [id number]           Patient Information     Date Of Birth          1965        Visit Information        Provider Department      6/16/2018 3:15 PM María Carey PA-C [facility]        Today's Diagnoses     Acute sinusitis with symptoms > 10 days    -  1    Acute seasonal allergic rhinitis, unspecified trigger          Care Instructions    Try hydrocortisone cream 1% (over the counter) in the ear canal for the itching.  Treating your allergies with Flonase or oral antihistamine (claritin) may also help with the ear itching and drainage.        Acute Bacterial Rhinosinusitis (ABRS)    Acute bacterial rhinosinusitis (ABRS) is an infection of your nasal cavity and sinuses. It s caused by bacteria. Acute means that you ve had symptoms for less than 4 weeks, but possibly up to 12 weeks.  Understanding your sinuses  The nasal cavity is the large air-filled space behind your nose. The sinuses are a group of spaces formed by the bones of your face. They connect with your nasal cavity. ABRS causes the tissue lining these spaces to become inflamed. Mucus may not drain normally. This leads to facial pain and other symptoms.  What causes ABRS?  ABRS most often follows an upper respiratory infection caused by a virus. Bacteria then infect the lining of your nasal cavity and sinuses. But you can also get ABRS if you have:    Nasal allergies    Long-term nasal swelling and congestion not caused by allergies    Blockage in the nose  Symptoms of ABRS  The symptoms of ABRS may be different for each person and include:    Nasal congestion or blockage    Pain or pressure in the face    Thick, colored drainage from the nose  Other symptoms may include:    Runny nose    Fluid draining from the nose down the throat (postnasal drip)    Headache    Cough    Pain    Fever  Diagnosing ABRS  ABRS may be diagnosed if you ve had an  upper respiratory infection like a cold and cough for 10 or more days without improvement or with worsening symptoms. Your healthcare provider will ask about your symptoms and your medical history. The provider will check your vital signs, including your temperature. You ll have a physical exam. The healthcare provider will check your ears, nose, and throat. You likely won t need any tests. If ABRS comes back, you may have a culture or other tests.  Treatment for ABRS  Treatment may include:    Antibiotic medicine. This is for symptoms that last for at least 10 to 14 days.    Nasal corticosteroid medicine. Drops or spray used in the nose can lessen swelling and congestion.    Over-the-counter pain medicine. This is to lessen sinus pain and pressure.    Nasal decongestant medicine. Spray or drops may help to lessen congestion. Do not use them for more than a few days.    Salt wash (saline irrigation). This can help to loosen mucus.  Possible complications of ABRS  ABRS may come back or become long-term (chronic). In rare cases, ABRS may cause complications such as:     Inflamed tissue around the brain and spinal cord (meningitis)    Inflamed tissue around the eyes (orbital cellulitis)    Inflamed bones around the sinuses (osteitis)  These problems may need to be treated in a hospital with intravenous (IV) antibiotic medicine or surgery.  When to call the healthcare provider  Call your healthcare provider if you have any of the following:    Symptoms that don t get better, or get worse    Symptoms that don t get better after 3 to 5 days on antibiotics    Trouble seeing    Swelling around your eyes    Confusion or trouble staying awake   Date Last Reviewed: 5/1/2017 2000-2017 [employer]. [address] [place] [address], [place] [address]. All rights reserved. This information is not intended as a substitute for professional medical care. Always follow your healthcare professional's instructions.                 "Follow-ups after your visit        Who to contact     If you have questions or need follow up information about today's clinic visit or your schedule please contact [facility] directly at [phone].  Normal or non-critical lab and imaging results will be communicated to you by [employer], letter or phone within 4 business days after the clinic has received the results. If you do not hear from us within 7 days, please contact the clinic through [employer] or phone. If you have a critical or abnormal lab result, we will notify you by phone as soon as possible.  Submit refill requests through [employer] or call your pharmacy and they will forward the refill request to us. Please allow 3 business days for your refill to be completed.          Additional Information About Your Visit        [employer] Information     [employer] gives you secure access to your electronic health record. If you see a primary care provider, you can also send messages to your care team and make appointments. If you have questions, please call your primary care clinic.  If you do not have a primary care provider, please call [phone] and they will assist you.        Care EveryWhere ID     This is your Care EveryWhere ID. This could be used by other organizations to access your [facility] medical records  [id number]        Your Vitals Were     Pulse Temperature Height BMI (Body Mass Index)          104 97.8  F (36.6  C) (Tympanic) 5' 3.5\" (1.613 m) 28.6 kg/m2         Blood Pressure from Last 3 Encounters:   06/16/18 144/78   06/22/17 120/78   05/23/16 141/82    Weight from Last 3 Encounters:   06/16/18 164 lb (74.4 kg)   06/22/17 157 lb (71.2 kg)   05/23/16 164 lb 9.6 oz (74.7 kg)              Today, you had the following     No orders found for display         Today's Medication Changes          These changes are accurate as of 6/16/18  4:06 PM.  If you have any questions, ask your nurse or doctor.               Start taking these " medicines.        Dose/Directions    amoxicillin-clavulanate 875-125 MG per tablet   Commonly known as:  AUGMENTIN   Used for:  Acute sinusitis with symptoms > 10 days   Started by:  María Carey PA-C        Dose:  1 tablet   Take 1 tablet by mouth 2 times daily   Quantity:  20 tablet   Refills:  0       fluticasone 50 MCG/ACT spray   Commonly known as:  FLONASE   Used for:  Acute sinusitis with symptoms > 10 days   Started by:  María Carey PA-C        Dose:  2 spray   Spray 2 sprays into both nostrils daily   Quantity:  1 Bottle   Refills:  0            Where to get your medicines      These medications were sent to [id number] - [place], [place] - [address]  [address], [facility] [address]     Phone:  [phone]     amoxicillin-clavulanate 875-125 MG per tablet    fluticasone 50 MCG/ACT spray                Primary Care Provider Office Phone # Fax #    Sanam Jessica Rivers [phone]       [address] [facility] [address]        Equal Access to Services     [facility]: Hadii aurelia bonilla hadbrittney Sodestiny, waaxda luqadaha, qaybta kaalmalori blanchard, gowdin barrios . So [facility] [place] [phone].    ATENCIÓN: Si habla español, tiene a dejesus disposición servicios gratmichaelos de asistencia lingüística. [facility] [phone].    We comply with applicable federal civil rights laws and [place] laws. We do not discriminate on the basis of race, color, national origin, age, disability, sex, sexual orientation, or gender identity.            Thank you!     Thank you for choosing [facility]  for your care. Our goal is always to provide you with excellent care. Hearing back from our patients is one way we can continue to improve our services. Please take a few minutes to complete the written survey that you may receive in the mail after your visit with us. Thank you!             Your Updated  Medication List - Protect others around you: Learn how to safely use, store and throw away your medicines at www.disposemymeds.org.          This list is accurate as of 6/16/18  4:06 PM.  Always use your most recent med list.                   Brand Name Dispense Instructions for use Diagnosis    amoxicillin-clavulanate 875-125 MG per tablet    AUGMENTIN    20 tablet    Take 1 tablet by mouth 2 times daily    Acute sinusitis with symptoms > 10 days       DULoxetine 60 MG EC capsule    CYMBALTA     Take 60 mg by mouth daily        fluticasone 50 MCG/ACT spray    FLONASE    1 Bottle    Spray 2 sprays into both nostrils daily    Acute sinusitis with symptoms > 10 days       lisinopril-hydrochlorothiazide 20-25 MG per tablet    PRINZIDE/ZESTORETIC     Take 1 tablet by mouth daily        polyethylene glycol-electrolytes 420 g solution    NULYTELY     Take 240 mLs by mouth Take 240 mL by mouth every 10 minutes.        * varenicline 0.5 MG X 11 & 1 MG X 42 tablet    CHANTIX PASTOR     Take one 0.5mg tab daily for 3 days, then one 0.5mg tab twice daily for 4 days, then one 1mg tab twice daily.        * varenicline 1 MG tablet    CHANTIX     Take 1 mg by mouth Take 1 mg by mouth 2 times daily with meals.        * Notice:  This list has 2 medication(s) that are the same as other medications prescribed for you. Read the directions carefully, and ask your doctor or other care provider to review them with you.

## 2018-07-13 DIAGNOSIS — J01.90 ACUTE SINUSITIS WITH SYMPTOMS > 10 DAYS: ICD-10-CM

## 2018-07-17 RX ORDER — FLUTICASONE PROPIONATE 50 MCG
SPRAY, SUSPENSION (ML) NASAL
Qty: 16 ML | Refills: 0 | OUTPATIENT
Start: 2018-07-17

## 2018-07-17 NOTE — TELEPHONE ENCOUNTER
Medication filled per [facility] provider.  Pt needs to be seen if symptoms have persisted.  Unable to complete prescription refill per RN Medication Refill Policy.................... Kylie Heard ....................  7/17/2018   11:05 AM

## 2018-07-23 NOTE — PROGRESS NOTES
Patient Information     Patient Name  Juana Bains MRN  [id number] Sex  Female   1965      Letter by Josefina Castillo MD at      Author:  Josefina Castillo MD Service:  (none) Author Type:  (none)    Filed:   Encounter Date:  2017 Status:  (Other)           Juana Bains  [address] [facility] [address]          2017    Dear Ms. Bians:    Following are the tests completed during your last clinic visit.  The results of these tests are normal and require no further attention unless otherwise noted.  Your cholesterol level was up with the elevated LDL or bad cholesterol 163. This makes your cholesterol/ HDL ratio unfavorable at 4.69. Given your history of hypertension, nicotine use I would recommend that you have a cholesterol-lowering medication as well as following a Mediterranean type diet. Please make an appointment at your convenience to discuss different types of cholesterol lowering medications and office visit.      Results for orders placed or performed in visit on 17      LIPID PANEL      Result  Value Ref Range    CHOLESTEROL,TOTAL 239 (H) <200 mg/dL    TRIGLYCERIDES 124 <150 mg/dL    HDL CHOLESTEROL 51 23 - 92 mg/dL    NON-HDL CHOLESTEROL 188 (H) <145 mg/dl    CHOL/HDL RATIO            4.69 (H) <4.50                    LDL CHOLESTEROL 163 (H) <100 mg/dL    PATIENT STATUS            FASTING                   COMP METABOLIC PANEL      Result  Value Ref Range    SODIUM 136 133 - 143 mmol/L    POTASSIUM 5.0 3.5 - 5.1 mmol/L    CHLORIDE 102 98 - 107 mmol/L    CO2,TOTAL 26 21 - 31 mmol/L    ANION GAP 8 5 - 18                    GLUCOSE 91 70 - 105 mg/dL    CALCIUM 10.3 8.6 - 10.3 mg/dL    BUN 15 7 - 25 mg/dL    CREATININE 0.90 0.70 - 1.30 mg/dL    BUN/CREAT RATIO           17                    GFR if African American >60 >60 ml/min/1.73m2    GFR if not African American >60 >60 ml/min/1.73m2    ALBUMIN 4.7 3.5 - 5.7 g/dL     PROTEIN,TOTAL 8.0 6.4 - 8.9 g/dL    GLOBULIN                  3.3 2.0 - 3.7 g/dL    A/G RATIO 1.4 1.0 - 2.0                    BILIRUBIN,TOTAL 0.5 0.3 - 1.0 mg/dL    ALK PHOSPHATASE 88 34 - 104 IU/L    ALT (SGPT) 18 7 - 52 IU/L    AST (SGOT) 16 13 - 39 IU/L   CBC WITH AUTO DIFFERENTIAL      Result  Value Ref Range    WHITE BLOOD COUNT         9.4 4.5 - 11.0 thou/cu mm    RED BLOOD COUNT           5.14 4.00 - 5.20 mil/cu mm    HEMOGLOBIN                15.8 12.0 - 16.0 g/dL    HEMATOCRIT                45.6 33.0 - 51.0 %    MCV                       89 80 - 100 fL    MCH                       30.7 26.0 - 34.0 pg    MCHC                      34.6 32.0 - 36.0 g/dL    RDW                       13.2 11.5 - 15.5 %    PLATELET COUNT            368 140 - 440 thou/cu mm    MPV                       10.0 6.5 - 11.0 fL    NEUTROPHILS               59.3 42.0 - 72.0 %    LYMPHOCYTES               27.9 20.0 - 44.0 %    MONOCYTES                 10.1 <12.0 %    EOSINOPHILS               1.9 <8.0 %    BASOPHILS                 0.6 <3.0 %    IMMATURE GRANULOCYTES(METAS,MYELOS,PROS) 0.2 %    ABSOLUTE NEUTROPHILS      5.5 1.7 - 7.0 thou/cu mm    ABSOLUTE LYMPHOCYTES      2.6 0.9 - 2.9 thou/cu mm    ABSOLUTE MONOCYTES        0.9 (H) <0.9 thou/cu mm    ABSOLUTE EOSINOPHILS      0.2 <0.5 thou/cu mm    ABSOLUTE BASOPHILS        0.1 <0.3 thou/cu mm    ABSOLUTE IMMATURE GRANULOCYTES(METAS,MYELOS,PROS) 0.0 <=0.3 thou/cu mm         If you have any further questions or problems contact my office.  I trust this finds you in good health and spirits.      Sincerely,         Electronically signed by Josefina Castillo MD

## 2018-08-03 DIAGNOSIS — I10 ESSENTIAL HYPERTENSION: Primary | ICD-10-CM

## 2018-08-03 NOTE — LETTER
August 7, 2018      Juana Farrell  [address] [facility] [address]        Dear MsKenia Mariealberto,    Your pharmacy has requested a refill of Prinzide.  This medication request has been addressed.     According to our records, you are overdue for annual medication management and labs with Dr. Sanam Rivers. Your last comprehensive visit was on 6/22/2017.     Your health is very important to us. Please contact our scheduling line at [phone] to set up this appointment at your earliest convenience, and before your next medication refills are needed.     Thank you for choosing [facility] and [facility] for your health care needs.     Sincerely,        The Refill Nurses  [facility]

## 2018-08-07 RX ORDER — LISINOPRIL AND HYDROCHLOROTHIAZIDE 20; 25 MG/1; MG/1
TABLET ORAL
Qty: 90 TABLET | Refills: 0 | Status: SHIPPED | OUTPATIENT
Start: 2018-08-07 | End: 2018-10-29

## 2018-08-07 NOTE — TELEPHONE ENCOUNTER
Patient is now due for annual medication management and labs. Reminder letter sent, and magi refill given.   Suzie Soto RN on 8/7/2018 at 2:51 PM

## 2018-10-29 ENCOUNTER — OFFICE VISIT (OUTPATIENT)
Dept: FAMILY MEDICINE | Facility: OTHER | Age: 53
End: 2018-10-29
Attending: FAMILY MEDICINE
Payer: COMMERCIAL

## 2018-10-29 VITALS
BODY MASS INDEX: 28.65 KG/M2 | TEMPERATURE: 96.6 F | HEIGHT: 64 IN | SYSTOLIC BLOOD PRESSURE: 138 MMHG | DIASTOLIC BLOOD PRESSURE: 90 MMHG | HEART RATE: 64 BPM | WEIGHT: 167.8 LBS

## 2018-10-29 DIAGNOSIS — H92.13 EAR DRAINAGE, BILATERAL: ICD-10-CM

## 2018-10-29 DIAGNOSIS — I10 ESSENTIAL HYPERTENSION: ICD-10-CM

## 2018-10-29 DIAGNOSIS — F41.1 GENERALIZED ANXIETY DISORDER: ICD-10-CM

## 2018-10-29 DIAGNOSIS — Z12.31 ENCOUNTER FOR SCREENING MAMMOGRAM FOR BREAST CANCER: ICD-10-CM

## 2018-10-29 DIAGNOSIS — Z00.00 HEALTH MAINTENANCE EXAMINATION: Primary | ICD-10-CM

## 2018-10-29 PROBLEM — L84 CORNS AND CALLOSITIES: Status: RESOLVED | Noted: 2018-01-18 | Resolved: 2018-10-29

## 2018-10-29 LAB
ALBUMIN SERPL-MCNC: 4.4 G/DL (ref 3.5–5.7)
ALP SERPL-CCNC: 90 U/L (ref 34–104)
ALT SERPL W P-5'-P-CCNC: 20 U/L (ref 7–52)
ANION GAP SERPL CALCULATED.3IONS-SCNC: 8 MMOL/L (ref 3–14)
AST SERPL W P-5'-P-CCNC: 16 U/L (ref 13–39)
BILIRUB SERPL-MCNC: 0.4 MG/DL (ref 0.3–1)
BUN SERPL-MCNC: 12 MG/DL (ref 7–25)
CALCIUM SERPL-MCNC: 9.7 MG/DL (ref 8.6–10.3)
CHLORIDE SERPL-SCNC: 101 MMOL/L (ref 98–107)
CHOLEST SERPL-MCNC: 238 MG/DL
CO2 SERPL-SCNC: 27 MMOL/L (ref 21–31)
CREAT SERPL-MCNC: 0.79 MG/DL (ref 0.6–1.2)
ERYTHROCYTE [DISTWIDTH] IN BLOOD BY AUTOMATED COUNT: 13 % (ref 10–15)
GFR SERPL CREATININE-BSD FRML MDRD: 76 ML/MIN/1.7M2
GLUCOSE SERPL-MCNC: 83 MG/DL (ref 70–105)
HCT VFR BLD AUTO: 44.3 % (ref 35–47)
HDLC SERPL-MCNC: 47 MG/DL (ref 23–92)
HGB BLD-MCNC: 15 G/DL (ref 11.7–15.7)
LDLC SERPL CALC-MCNC: 159 MG/DL
MCH RBC QN AUTO: 29.7 PG (ref 26.5–33)
MCHC RBC AUTO-ENTMCNC: 33.9 G/DL (ref 31.5–36.5)
MCV RBC AUTO: 88 FL (ref 78–100)
NONHDLC SERPL-MCNC: 191 MG/DL
PLATELET # BLD AUTO: 336 10E9/L (ref 150–450)
POTASSIUM SERPL-SCNC: 3.6 MMOL/L (ref 3.5–5.1)
PROT SERPL-MCNC: 8 G/DL (ref 6.4–8.9)
RBC # BLD AUTO: 5.05 10E12/L (ref 3.8–5.2)
SODIUM SERPL-SCNC: 136 MMOL/L (ref 134–144)
TRIGL SERPL-MCNC: 160 MG/DL
WBC # BLD AUTO: 8.4 10E9/L (ref 4–11)

## 2018-10-29 PROCEDURE — 36415 COLL VENOUS BLD VENIPUNCTURE: CPT | Performed by: FAMILY MEDICINE

## 2018-10-29 PROCEDURE — 80061 LIPID PANEL: CPT | Performed by: FAMILY MEDICINE

## 2018-10-29 PROCEDURE — 80053 COMPREHEN METABOLIC PANEL: CPT | Performed by: FAMILY MEDICINE

## 2018-10-29 PROCEDURE — 99396 PREV VISIT EST AGE 40-64: CPT | Performed by: FAMILY MEDICINE

## 2018-10-29 PROCEDURE — 85027 COMPLETE CBC AUTOMATED: CPT | Performed by: FAMILY MEDICINE

## 2018-10-29 RX ORDER — NEOMYCIN SULFATE, POLYMYXIN B SULFATE AND HYDROCORTISONE 10; 3.5; 1 MG/ML; MG/ML; [USP'U]/ML
4 SUSPENSION/ DROPS AURICULAR (OTIC) 3 TIMES DAILY
Qty: 10 ML | Refills: 4 | Status: SHIPPED | OUTPATIENT
Start: 2018-10-29 | End: 2020-01-17

## 2018-10-29 RX ORDER — LISINOPRIL AND HYDROCHLOROTHIAZIDE 20; 25 MG/1; MG/1
1 TABLET ORAL DAILY
Qty: 90 TABLET | Refills: 4 | Status: SHIPPED | OUTPATIENT
Start: 2018-10-29 | End: 2019-12-06

## 2018-10-29 RX ORDER — DULOXETIN HYDROCHLORIDE 60 MG/1
CAPSULE, DELAYED RELEASE ORAL
Qty: 90 CAPSULE | Refills: 4 | Status: SHIPPED | OUTPATIENT
Start: 2018-10-29 | End: 2019-12-06

## 2018-10-29 ASSESSMENT — ENCOUNTER SYMPTOMS: PSYCHIATRIC NEGATIVE: 1

## 2018-10-29 NOTE — MR AVS SNAPSHOT
After Visit Summary   10/29/2018    Juana Farrell    MRN: [id number]           Patient Information     Date Of Birth          1965        Visit Information        Provider Department      10/29/2018 2:00 PM Sanam Rivers MD [facility]        Today's Diagnoses     Health maintenance examination    -  1    Generalized anxiety disorder        Essential hypertension        Encounter for screening mammogram for breast cancer        Ear drainage, bilateral          Care Instructions      Prevention Guidelines, Women Ages 50 to 64  Screening tests and vaccines are an important part of managing your health. A screening test is done to find possible disorders or diseases in people who don't have any symptoms. The goal is to find a disease early so lifestyle changes can be made and you can be watched more closely to reduce the risk of disease, or to detect it early enough to treat it most effectively. Screening tests are not considered diagnostic, but are used to determine if more testing is needed. Health counseling is essential, too. Below are guidelines for these, for women ages 50 to 64. Talk with your healthcare provider to make sure you re up to date on what you need.  Screening Who needs it How often   Type 2 diabetes or prediabetes All women beginning at age 45 and women without symptoms at any age who are overweight or obese and have 1 or more additional risk factors for diabetes. At  least every 3 years   Type 2 diabetes or prediabetes All women diagnosed with gestational diabetes Lifelong testing every 3 years   Type 2 diabetes All women with prediabetes Every year   Alcohol misuse All women in this age group At routine exams   Blood pressure All women in this age group Yearly checkup if your blood pressure is normal  Normal blood pressure is less than 120/80 mm Hg  If your blood pressure reading is higher than normal, follow the advice of your healthcare  provider   Breast cancer All women at average risk in this age group Yearly mammogram should be done until age 54. At age 55, switch to mammograms every other year or choose to continue yearly mammograms.  All women should be familiar with the potential benefits and risks of breast cancer screening with mammograms.      Cervical cancer All women in this age group, except women who have had a complete hysterectomy Pap test every 3 years or Pap test with human papillomavirus (HPV) test every 5 years   Chlamydia Women at increased risk for infection At routine exams   Colorectal cancer All women in this age group Flexible sigmoidoscopy every 5 years, or colonoscopy every 10 years, or double-contrast barium enema every 5 years; yearly fecal occult blood test or fecal immunochemical test; or a stool DNA test as often as your health care provider advises; talk with your health care provider about which tests are best for you   Depression All women in this age group At routine exams   Gonorrhea Sexually active women at increased risk for infection At routine exams   Hepatitis C Anyone at increased risk; 1 time for those born between 1945 and 1965 At routine exams   High cholesterol or triglycerides All women in this age group who are at risk for coronary artery disease At least every 5 years   HIV All women At routine exams   Lung cancer Adults age 55 to 80 who have smoked Yearly screening in smokers with 30 pack-year history of smoking or who quit within 15 years   Obesity All women in this age group At routine exams   Osteoporosis Women who are postmenopausal Ask your healthcare provider   Syphilis Women at increased risk for infection - talk with your healthcare provider At routine exams   Tuberculosis Women at increased risk for infection - talk with your healthcare provider Ask your healthcare provider   Vision All women in this age group Ask your healthcare provider   Vaccine Who needs it How often   Chickenpox  (varicella) All women in this age group who have no record of this infection or vaccine 2 doses; the second dose should be given at least 4 weeks after the first dose   Hepatitis A Women at increased risk for infection - talk with your healthcare provider 2 doses given at least 6 months apart   Hepatitis B Women at increased risk for infection - talk with your healthcare provider 3 doses over 6 months; second dose should be given 1 month after the first dose; the third dose should be given at least 2 months after the second dose and at least 4 months after the first dose   Haemophilus influenzaeType B (HIB) Women at increased risk for infection - talk with your healthcare provider 1 to 3 doses   Influenza (flu) All women in this age group Once a year   Measles, mumps, rubella (MMR) Women in this age group through their late 50s who have no record of these infections or vaccines 1 dose   Meningococcal Women at increased risk for infection - talk with your healthcare provider 1 or more doses   Pneumococcal conjugate vaccine (PCV13) and pneumococcal polysaccharide vaccine (PPSV23) Women at increased risk for infection - talk with your healthcare provider PCV13: 1 dose ages 19 to 65 (protects against 13 types of pneumococcal bacteria)  PPSV23: 1 to 2 doses through age 64, or 1 dose at 65 or older (protects against 23 types of pneumococcal bacteria)   Tetanus/diphtheria/pertussis (Td/Tdap) booster All women in this age group Td every 10 years, or a one-time dose of Tdap instead of a Td booster after age 18, then Td every 10 years   Zoster All women ages 60 and older 1 dose   Counseling Who needs it How often   BRCA gene mutation testing for breast and ovarian cancer susceptibility Women with increased risk for having gene mutation When your risk is known   Breast cancer and chemoprevention Women at high risk for breast cancer When your risk is known   Diet and exercise Women who are overweight or obese When diagnosed,  and then at routine exams   Sexually transmitted infection prevention Women at increased risk for infection - talk with your healthcare provider At routine exams   Use of daily aspirin Women ages 55 and up in this age group who are at risk for cardiovascular health problems such as stroke When your risk is known   Use of tobacco and the health effects it can cause All women in this age group Every exam   1American Cancer Society  Date Last Reviewed: 1/26/2016 2000-2017 [employer]. [address] [place] [address], [place] [address]. All rights reserved. This information is not intended as a substitute for professional medical care. Always follow your healthcare professional's instructions.                Follow-ups after your visit        Future tests that were ordered for you today     Open Future Orders        Priority Expected Expires Ordered    MA Screening Digital Bilateral Routine  10/29/2019 10/29/2018            Who to contact     If you have questions or need follow up information about today's clinic visit or your schedule please contact [facility] directly at [phone].  Normal or non-critical lab and imaging results will be communicated to you by [employer]point Systemshart, letter or phone within 4 business days after the clinic has received the results. If you do not hear from us within 7 days, please contact the clinic through [employer] or phone. If you have a critical or abnormal lab result, we will notify you by phone as soon as possible.  Submit refill requests through judo or call your pharmacy and they will forward the refill request to us. Please allow 3 business days for your refill to be completed.          Additional Information About Your Visit        Certpoint Systemshart Information     judo gives you secure access to your electronic health record. If you see a primary care provider, you can also send messages to your care team and make appointments. If you have questions, please call  "your primary care clinic.  If you do not have a primary care provider, please call [phone] and they will assist you.        Care EveryWhere ID     This is your Care EveryWhere ID. This could be used by other organizations to access your [facility] medical records  [id number]        Your Vitals Were     Pulse Temperature Height Breastfeeding? BMI (Body Mass Index)       64 96.6  F (35.9  C) 5' 3.5\" (1.613 m) No 29.26 kg/m2        Blood Pressure from Last 3 Encounters:   10/29/18 138/90   06/16/18 144/78   06/22/17 120/78    Weight from Last 3 Encounters:   10/29/18 167 lb 12.8 oz (76.1 kg)   06/16/18 164 lb (74.4 kg)   06/22/17 157 lb (71.2 kg)              We Performed the Following     CBC W PLT No Diff     Comprehensive Metabolic Panel     Lipid Panel          Today's Medication Changes          These changes are accurate as of 10/29/18  2:40 PM.  If you have any questions, ask your nurse or doctor.               Start taking these medicines.        Dose/Directions    neomycin-polymyxin-hydrocortisone 3.5-26269-5 otic suspension   Commonly known as:  CORTISPORIN   Used for:  Ear drainage, bilateral   Started by:  Sanam Rivers MD        Dose:  4 drop   Place 4 drops into both ears 3 times daily   Quantity:  10 mL   Refills:  4         These medicines have changed or have updated prescriptions.        Dose/Directions    lisinopril-hydrochlorothiazide 20-25 MG per tablet   Commonly known as:  PRINZIDE/ZESTORETIC   This may have changed:  See the new instructions.   Used for:  Essential hypertension   Changed by:  Sanam Rivers MD        Dose:  1 tablet   Take 1 tablet by mouth daily   Quantity:  90 tablet   Refills:  4            Where to get your medicines      These medications were sent to [id number] - [place], [place] - [address] [facility] [facility] [address]     Phone:  [phone]     DULoxetine 60 MG EC capsule    " lisinopril-hydrochlorothiazide 20-25 MG per tablet    neomycin-polymyxin-hydrocortisone 3.5-68006-9 otic suspension                Primary Care Provider Office Phone # Fax #    Sanam Rivers [phone] [address]   [facility] [address]        Equal Access to Services     OLGA HIGHTOWER : Hadii aad ku hadasho Soomaali, waaxda luqadaha, qaybta kaalmada adeegyada, godwin mccloud haykt bonnerdarrylclint barrios . So [facility] [phone].    ATENCIÓN: Si habla español, tiene a dejesus disposición servicios gratuitos de asistencia lingüística. Llame al [phone].    We comply with applicable federal civil rights laws and [place] laws. We do not discriminate on the basis of race, color, national origin, age, disability, sex, sexual orientation, or gender identity.            Thank you!     Thank you for choosing [facility]  for your care. Our goal is always to provide you with excellent care. Hearing back from our patients is one way we can continue to improve our services. Please take a few minutes to complete the written survey that you may receive in the mail after your visit with us. Thank you!             Your Updated Medication List - Protect others around you: Learn how to safely use, store and throw away your medicines at www.disposemymeds.org.          This list is accurate as of 10/29/18  2:40 PM.  Always use your most recent med list.                   Brand Name Dispense Instructions for use Diagnosis    DULoxetine 60 MG EC capsule    CYMBALTA    90 capsule    TAKE 1 CAPSULE BY MOUTH ONCE DAILY    Generalized anxiety disorder       fluticasone 50 MCG/ACT spray    FLONASE    1 Bottle    Spray 2 sprays into both nostrils daily    Acute sinusitis with symptoms > 10 days       lisinopril-hydrochlorothiazide 20-25 MG per tablet    PRINZIDE/ZESTORETIC    90 tablet    Take 1 tablet by mouth daily    Essential hypertension       neomycin-polymyxin-hydrocortisone  3.5-30159-2 otic suspension    CORTISPORIN    10 mL    Place 4 drops into both ears 3 times daily    Ear drainage, bilateral

## 2018-10-29 NOTE — PATIENT INSTRUCTIONS
Prevention Guidelines, Women Ages 50 to 64  Screening tests and vaccines are an important part of managing your health. A screening test is done to find possible disorders or diseases in people who don't have any symptoms. The goal is to find a disease early so lifestyle changes can be made and you can be watched more closely to reduce the risk of disease, or to detect it early enough to treat it most effectively. Screening tests are not considered diagnostic, but are used to determine if more testing is needed. Health counseling is essential, too. Below are guidelines for these, for women ages 50 to 64. Talk with your healthcare provider to make sure you re up to date on what you need.  Screening Who needs it How often   Type 2 diabetes or prediabetes All women beginning at age 45 and women without symptoms at any age who are overweight or obese and have 1 or more additional risk factors for diabetes. At  least every 3 years   Type 2 diabetes or prediabetes All women diagnosed with gestational diabetes Lifelong testing every 3 years   Type 2 diabetes All women with prediabetes Every year   Alcohol misuse All women in this age group At routine exams   Blood pressure All women in this age group Yearly checkup if your blood pressure is normal  Normal blood pressure is less than 120/80 mm Hg  If your blood pressure reading is higher than normal, follow the advice of your healthcare provider   Breast cancer All women at average risk in this age group Yearly mammogram should be done until age 54. At age 55, switch to mammograms every other year or choose to continue yearly mammograms.  All women should be familiar with the potential benefits and risks of breast cancer screening with mammograms.      Cervical cancer All women in this age group, except women who have had a complete hysterectomy Pap test every 3 years or Pap test with human papillomavirus (HPV) test every 5 years   Chlamydia Women at increased risk for  infection At routine exams   Colorectal cancer All women in this age group Flexible sigmoidoscopy every 5 years, or colonoscopy every 10 years, or double-contrast barium enema every 5 years; yearly fecal occult blood test or fecal immunochemical test; or a stool DNA test as often as your health care provider advises; talk with your health care provider about which tests are best for you   Depression All women in this age group At routine exams   Gonorrhea Sexually active women at increased risk for infection At routine exams   Hepatitis C Anyone at increased risk; 1 time for those born between 1945 and 1965 At routine exams   High cholesterol or triglycerides All women in this age group who are at risk for coronary artery disease At least every 5 years   HIV All women At routine exams   Lung cancer Adults age 55 to 80 who have smoked Yearly screening in smokers with 30 pack-year history of smoking or who quit within 15 years   Obesity All women in this age group At routine exams   Osteoporosis Women who are postmenopausal Ask your healthcare provider   Syphilis Women at increased risk for infection - talk with your healthcare provider At routine exams   Tuberculosis Women at increased risk for infection - talk with your healthcare provider Ask your healthcare provider   Vision All women in this age group Ask your healthcare provider   Vaccine Who needs it How often   Chickenpox (varicella) All women in this age group who have no record of this infection or vaccine 2 doses; the second dose should be given at least 4 weeks after the first dose   Hepatitis A Women at increased risk for infection - talk with your healthcare provider 2 doses given at least 6 months apart   Hepatitis B Women at increased risk for infection - talk with your healthcare provider 3 doses over 6 months; second dose should be given 1 month after the first dose; the third dose should be given at least 2 months after the second dose and at least  4 months after the first dose   Haemophilus influenzaeType B (HIB) Women at increased risk for infection - talk with your healthcare provider 1 to 3 doses   Influenza (flu) All women in this age group Once a year   Measles, mumps, rubella (MMR) Women in this age group through their late 50s who have no record of these infections or vaccines 1 dose   Meningococcal Women at increased risk for infection - talk with your healthcare provider 1 or more doses   Pneumococcal conjugate vaccine (PCV13) and pneumococcal polysaccharide vaccine (PPSV23) Women at increased risk for infection - talk with your healthcare provider PCV13: 1 dose ages 19 to 65 (protects against 13 types of pneumococcal bacteria)  PPSV23: 1 to 2 doses through age 64, or 1 dose at 65 or older (protects against 23 types of pneumococcal bacteria)   Tetanus/diphtheria/pertussis (Td/Tdap) booster All women in this age group Td every 10 years, or a one-time dose of Tdap instead of a Td booster after age 18, then Td every 10 years   Zoster All women ages 60 and older 1 dose   Counseling Who needs it How often   BRCA gene mutation testing for breast and ovarian cancer susceptibility Women with increased risk for having gene mutation When your risk is known   Breast cancer and chemoprevention Women at high risk for breast cancer When your risk is known   Diet and exercise Women who are overweight or obese When diagnosed, and then at routine exams   Sexually transmitted infection prevention Women at increased risk for infection - talk with your healthcare provider At routine exams   Use of daily aspirin Women ages 55 and up in this age group who are at risk for cardiovascular health problems such as stroke When your risk is known   Use of tobacco and the health effects it can cause All women in this age group Every exam   1American Cancer Society  Date Last Reviewed: 1/26/2016 2000-2017 [employer]. [address] [place] [address]. All  rights reserved. This information is not intended as a substitute for professional medical care. Always follow your healthcare professional's instructions.

## 2018-10-29 NOTE — NURSING NOTE
Patient presents to clinic for medication refills.  Emelina Enciso ....................  10/29/2018   2:04 PM

## 2018-10-30 ENCOUNTER — MYC MEDICAL ADVICE (OUTPATIENT)
Dept: FAMILY MEDICINE | Facility: OTHER | Age: 53
End: 2018-10-30

## 2018-10-30 DIAGNOSIS — E78.2 MIXED HYPERLIPIDEMIA: ICD-10-CM

## 2018-10-30 RX ORDER — ATORVASTATIN CALCIUM 10 MG/1
10 TABLET, FILM COATED ORAL DAILY
Qty: 90 TABLET | Refills: 3 | Status: SHIPPED | OUTPATIENT
Start: 2018-10-30 | End: 2019-12-03

## 2018-12-01 DIAGNOSIS — F41.1 GENERALIZED ANXIETY DISORDER: ICD-10-CM

## 2018-12-04 RX ORDER — DULOXETIN HYDROCHLORIDE 60 MG/1
CAPSULE, DELAYED RELEASE ORAL
Qty: 90 CAPSULE | Refills: 0 | OUTPATIENT
Start: 2018-12-04

## 2018-12-04 NOTE — TELEPHONE ENCOUNTER
Redundant Refill Request for Cymbalta refused;    Medication Detail         Disp Refills Start End NAZIA     DULoxetine (CYMBALTA) 60 MG EC capsule 90 capsule 4 10/29/2018  No     Sig: TAKE 1 CAPSULE BY MOUTH ONCE DAILY     Class: E-Prescribe     Order: [id number]     E-Prescribing Status: Receipt confirmed by pharmacy (10/29/2018  2:18 PM CDT)       Printout Tracking      External Result Report       Medication Administration Instructions      TAKE 1 CAPSULE BY MOUTH ONCE DAILY       Pharmacy      [facility] - [place], [place] -  [address]     Unable to complete prescription refill per RN Medication Refill Policy. Agustin Kessler 12/4/2018 2:35 PM

## 2019-03-28 ENCOUNTER — TRANSFERRED RECORDS (OUTPATIENT)
Dept: HEALTH INFORMATION MANAGEMENT | Facility: OTHER | Age: 54
End: 2019-03-28

## 2019-11-03 ENCOUNTER — HEALTH MAINTENANCE LETTER (OUTPATIENT)
Age: 54
End: 2019-11-03

## 2019-12-03 ENCOUNTER — OFFICE VISIT (OUTPATIENT)
Dept: INTERNAL MEDICINE | Facility: OTHER | Age: 54
End: 2019-12-03
Attending: INTERNAL MEDICINE
Payer: COMMERCIAL

## 2019-12-03 VITALS
SYSTOLIC BLOOD PRESSURE: 150 MMHG | DIASTOLIC BLOOD PRESSURE: 86 MMHG | HEIGHT: 64 IN | WEIGHT: 160.6 LBS | BODY MASS INDEX: 27.42 KG/M2 | HEART RATE: 102 BPM | OXYGEN SATURATION: 96 % | TEMPERATURE: 97.3 F | RESPIRATION RATE: 18 BRPM

## 2019-12-03 DIAGNOSIS — Z72.0 TOBACCO USE: ICD-10-CM

## 2019-12-03 DIAGNOSIS — I10 BENIGN ESSENTIAL HYPERTENSION: Primary | ICD-10-CM

## 2019-12-03 PROCEDURE — 99214 OFFICE O/P EST MOD 30 MIN: CPT | Performed by: INTERNAL MEDICINE

## 2019-12-03 RX ORDER — POLYETHYLENE GLYCOL 3350 17 G
2 POWDER IN PACKET (EA) ORAL
Qty: 168 LOZENGE | Refills: 5 | Status: SHIPPED | OUTPATIENT
Start: 2019-12-03 | End: 2021-11-26

## 2019-12-03 RX ORDER — AMLODIPINE BESYLATE 5 MG/1
5-10 TABLET ORAL EVERY EVENING
Qty: 180 TABLET | Refills: 0 | Status: SHIPPED | OUTPATIENT
Start: 2019-12-03 | End: 2020-01-17

## 2019-12-03 ASSESSMENT — PATIENT HEALTH QUESTIONNAIRE - PHQ9
SUM OF ALL RESPONSES TO PHQ QUESTIONS 1-9: 5
5. POOR APPETITE OR OVEREATING: NEARLY EVERY DAY

## 2019-12-03 ASSESSMENT — ENCOUNTER SYMPTOMS
VOMITING: 0
NECK PAIN: 1
DIARRHEA: 0
SHORTNESS OF BREATH: 0
WHEEZING: 0
COUGH: 0
ABDOMINAL PAIN: 0
NAUSEA: 0
LIGHT-HEADEDNESS: 0
AGITATION: 0
ARTHRALGIAS: 0
DIZZINESS: 0
BRUISES/BLEEDS EASILY: 0
HEADACHES: 1
CHILLS: 0
FEVER: 0
DYSURIA: 0
PALPITATIONS: 0
MYALGIAS: 0
HEMATURIA: 0
WOUND: 0
CONFUSION: 0

## 2019-12-03 ASSESSMENT — PAIN SCALES - GENERAL: PAINLEVEL: NO PAIN (1)

## 2019-12-03 ASSESSMENT — ANXIETY QUESTIONNAIRES
6. BECOMING EASILY ANNOYED OR IRRITABLE: NEARLY EVERY DAY
5. BEING SO RESTLESS THAT IT IS HARD TO SIT STILL: NEARLY EVERY DAY
IF YOU CHECKED OFF ANY PROBLEMS ON THIS QUESTIONNAIRE, HOW DIFFICULT HAVE THESE PROBLEMS MADE IT FOR YOU TO DO YOUR WORK, TAKE CARE OF THINGS AT HOME, OR GET ALONG WITH OTHER PEOPLE: EXTREMELY DIFFICULT
1. FEELING NERVOUS, ANXIOUS, OR ON EDGE: NEARLY EVERY DAY
7. FEELING AFRAID AS IF SOMETHING AWFUL MIGHT HAPPEN: NEARLY EVERY DAY
GAD7 TOTAL SCORE: 21
3. WORRYING TOO MUCH ABOUT DIFFERENT THINGS: NEARLY EVERY DAY
2. NOT BEING ABLE TO STOP OR CONTROL WORRYING: NEARLY EVERY DAY

## 2019-12-03 ASSESSMENT — MIFFLIN-ST. JEOR: SCORE: 1303.48

## 2019-12-03 NOTE — NURSING NOTE
Patient presents to the clinic for high BP.     Medication Reconciliation: complete   Ainsley Ann LPN............. December 3, 2019 3:45 PM

## 2019-12-03 NOTE — PROGRESS NOTES
Nursing Notes:   Ainsley Ann LPN  12/3/2019  4:07 PM  Signed  Patient presents to the clinic for high BP.     Medication Reconciliation: complete   Ainsley Ann LPN............. December 3, 2019 3:45 PM              Nursing note reviewed with patient.  Accuracy and completeness verified.   Ms. Everton Farrell is a 54 year old female who:  Patient presents with:  Hypertension      ICD-10-CM    1. Benign essential hypertension I10 amLODIPine (NORVASC) 5 MG tablet   2. Tobacco use Z72.0 nicotine (NICORETTE) 2 MG gum     nicotine (COMMIT) 2 MG lozenge     HPI  Patient comes in due to elevated blood pressure.  She has had lisinopril-hydrochlorothiazide for many years.  Blood pressure runs in the family.    She reports in the past when she has had elevated blood pressure she gets some headaches and neck aches and some shoulder aches.  The last few months she is been having a lot of stress at home and with family.    She has been clenching her teeth a lot.    She went to the public health office and had her blood pressure checked today and it measured 162/104.    States that her left arm blood pressure is always a bit higher than the right arm.    In the clinic today her blood pressure was 150/86.    She has no edema.  Denies exertional shortness of breath.    She does smoke about half pack cigarettes per day.    We discussed essential hypertension versus secondary hypertension.  Recommend starting amlodipine 5 mg daily in the evening.  Increase dose if needed.  Check blood pressures at home or at work.    To help quit smoking, she would like to quit.  She quit once in the past but gained 10 pounds quickly and started smoking again.  Start Nicotrol lozenges or gum.  Prescription for both sent to pharmacy.    She has establish care appointment with her new primary care provider in approximately 6 weeks.    Functional Capacity: > 4 METS.   Review of Systems   Constitutional: Negative for chills and  fever.   HENT: Negative for congestion and hearing loss.    Eyes: Negative for visual disturbance.   Respiratory: Negative for cough, shortness of breath and wheezing.         + tobacco use   Cardiovascular: Negative for chest pain and palpitations.   Gastrointestinal: Negative for abdominal pain, diarrhea, nausea and vomiting.   Endocrine: Negative for cold intolerance and heat intolerance.   Genitourinary: Negative for dysuria and hematuria.   Musculoskeletal: Positive for neck pain. Negative for arthralgias and myalgias.   Skin: Negative for rash and wound.   Allergic/Immunologic: Negative for immunocompromised state.   Neurological: Positive for headaches (  mild). Negative for dizziness and light-headedness.   Hematological: Does not bruise/bleed easily.   Psychiatric/Behavioral: Negative for agitation and confusion.          Problem List/PMH: reviewed in EMR, and made relevant updates today.  Medications: reviewed in EMR, and made relevant updates today.  Allergies: reviewed in EMR, and made relevant updates today.  I reviewed family and social history and made relevant updates today.  Social History     Tobacco Use     Smoking status: Current Every Day Smoker     Packs/day: 0.50     Years: 15.00     Pack years: 7.50     Types: Cigarettes     Smokeless tobacco: Never Used   Substance Use Topics     Alcohol use: No     Alcohol/week: 0.0 standard drinks     Comment: Alcoholic Drinks/day: rare     Drug use: No     Comment: Drug use: No      Family History   Problem Relation Age of Onset     Cancer Father         Cancer,Abernathy's polyposis syndrome with father dying early of colon cancer.   at age 41 of Abernathy's polyposis with resulting colon cancer     Colon Cancer Brother      Colon Cancer Brother      Other - See Comments Other FHx        Family history of Abernathy's polyposis syndrome, negative genetic testing     Diabetes Maternal Grandmother         Diabetes,FHMaternal diabetes in the family       EXAM:  "  Vitals:    12/03/19 1548   BP: (!) 150/86   BP Location: Right arm   Patient Position: Sitting   Cuff Size: Adult Regular   Pulse: 102   Resp: 18   Temp: 97.3  F (36.3  C)   TempSrc: Tympanic   SpO2: 96%   Weight: 72.8 kg (160 lb 9.6 oz)   Height: 1.626 m (5' 4\")       Current Pain Score: No Pain (1)     BP Readings from Last 3 Encounters:   12/03/19 (!) 150/86   10/29/18 138/90   06/16/18 144/78      Wt Readings from Last 3 Encounters:   12/03/19 72.8 kg (160 lb 9.6 oz)   10/29/18 76.1 kg (167 lb 12.8 oz)   06/16/18 74.4 kg (164 lb)      Estimated body mass index is 27.57 kg/m  as calculated from the following:    Height as of this encounter: 1.626 m (5' 4\").    Weight as of this encounter: 72.8 kg (160 lb 9.6 oz).     Physical Exam  Constitutional:       Appearance: Normal appearance.   HENT:      Head: Normocephalic and atraumatic.      Nose: No rhinorrhea.   Eyes:      General: No scleral icterus.     Conjunctiva/sclera: Conjunctivae normal.   Cardiovascular:      Rate and Rhythm: Normal rate and regular rhythm.      Pulses: Normal pulses.   Pulmonary:      Effort: Pulmonary effort is normal.      Breath sounds: Normal breath sounds.   Skin:     General: Skin is warm and dry.   Neurological:      General: No focal deficit present.      Mental Status: She is alert.   Psychiatric:         Mood and Affect: Mood normal.          Procedures   INVESTIGATIONS:  No results found for any visits on 12/03/19.    ASSESSMENT AND PLAN:  Problem List Items Addressed This Visit        Circulatory    Benign essential hypertension - Primary    Relevant Medications    amLODIPine (NORVASC) 5 MG tablet      Other Visit Diagnoses     Tobacco use        Relevant Medications    nicotine (NICORETTE) 2 MG gum    nicotine (COMMIT) 2 MG lozenge        reviewed diet, exercise and weight control, recommended sodium restriction  -- Expected clinical course discussed    -- Medications and their side effects discussed    The 10-year ASCVD " risk score (Alicia BRAN Jr., et al., 2013) is: 12.7%    Values used to calculate the score:      Age: 54 years      Sex: Female      Is Non- : No      Diabetic: No      Tobacco smoker: Yes      Systolic Blood Pressure: 150 mmHg      Is BP treated: Yes      HDL Cholesterol: 47 mg/dL      Total Cholesterol: 238 mg/dL    Patient Instructions   Blood pressures are high...     Start norvasc 5 mg nightly for a couple nights.... if blood pressures are still high.. okay to increase to 10 mg daily.     Can take 5 mg in AM and 5 mg in PM.       Blood pressure checks at home - check some in AM, some in Afternoon, some in Evening and record   -- bring these with you to your next appointment.     Goal blood pressures -- less than 140 and less than 90.    -- Ideally would like the numbers about 110-130 and 70-80.  -- If running higher or lower than this on regular basis, will need to adjust your medications.      Start nicotine lozenges / gum.     QUIT SMOKING!!     -- Choose a quit date (within 1 month). Quitting smoking abruptly is more successful than gradually cutting back.   -- Tell everyone about it (friends, family, coworkers)   -- Think about when you smoke the most, and what you'll do during those times (eg when in the car, work breaks, etc)     Consider:   -- Start varenicline (Chantix) 1 week before your quit date   -- Start bupropion (Wellbutrin/Zyban) 1 week before your quit date -- Welbutrin 1 pill daily for 1 week then 1 pill twice daily      -- Stop smoking on quit date   -- Starting with quit date, use nicotine lozenges/gum as needed for cravings     -- Quit Plan - Call them in the next 1-2 weeks to help you quit smoking.                        [phone] ([phone])                        http://www.quitplan.com   -- http://smokefree.gov/       Return as needed for follow-up with Dr. Hernandez.    Clinic : [phone]  Appointment line: [phone]     Nahun Hernandez MD  Internal  Medicine  [facility]     Portions of this note were dictated using speech recognition software. The note has been proofread but errors in the text may have been overlooked. Please contact me if there are any concerns regarding the accuracy of the dictation.

## 2019-12-03 NOTE — PATIENT INSTRUCTIONS
Blood pressures are high...     Start norvasc 5 mg nightly for a couple nights.... if blood pressures are still high.. okay to increase to 10 mg daily.     Can take 5 mg in AM and 5 mg in PM.       Blood pressure checks at home - check some in AM, some in Afternoon, some in Evening and record   -- bring these with you to your next appointment.     Goal blood pressures -- less than 140 and less than 90.    -- Ideally would like the numbers about 110-130 and 70-80.  -- If running higher or lower than this on regular basis, will need to adjust your medications.      Start nicotine lozenges / gum.     QUIT SMOKING!!     -- Choose a quit date (within 1 month). Quitting smoking abruptly is more successful than gradually cutting back.   -- Tell everyone about it (friends, family, coworkers)   -- Think about when you smoke the most, and what you'll do during those times (eg when in the car, work breaks, etc)     Consider:   -- Start varenicline (Chantix) 1 week before your quit date   -- Start bupropion (Wellbutrin/Zyban) 1 week before your quit date -- Welbutrin 1 pill daily for 1 week then 1 pill twice daily      -- Stop smoking on quit date   -- Starting with quit date, use nicotine lozenges/gum as needed for cravings     -- Quit Plan - Call them in the next 1-2 weeks to help you quit smoking.                        [phone] ([phone])                        http://www.quitplan.com   -- http://smokefree.gov/       Return as needed for follow-up with Dr. Hernandez.    Clinic : [phone]  Appointment line: [phone]

## 2019-12-04 ASSESSMENT — ANXIETY QUESTIONNAIRES: GAD7 TOTAL SCORE: 21

## 2019-12-09 DIAGNOSIS — I10 ESSENTIAL HYPERTENSION: ICD-10-CM

## 2019-12-09 DIAGNOSIS — F41.1 GENERALIZED ANXIETY DISORDER: ICD-10-CM

## 2019-12-12 RX ORDER — LISINOPRIL AND HYDROCHLOROTHIAZIDE 20; 25 MG/1; MG/1
TABLET ORAL
Qty: 90 TABLET | Refills: 0 | OUTPATIENT
Start: 2019-12-12

## 2019-12-12 RX ORDER — DULOXETIN HYDROCHLORIDE 60 MG/1
CAPSULE, DELAYED RELEASE ORAL
Qty: 90 CAPSULE | Refills: 0 | OUTPATIENT
Start: 2019-12-12

## 2020-01-17 ENCOUNTER — OFFICE VISIT (OUTPATIENT)
Dept: INTERNAL MEDICINE | Facility: OTHER | Age: 55
End: 2020-01-17
Attending: INTERNAL MEDICINE
Payer: COMMERCIAL

## 2020-01-17 VITALS
DIASTOLIC BLOOD PRESSURE: 84 MMHG | OXYGEN SATURATION: 97 % | HEIGHT: 63 IN | HEART RATE: 89 BPM | WEIGHT: 156.8 LBS | SYSTOLIC BLOOD PRESSURE: 140 MMHG | BODY MASS INDEX: 27.78 KG/M2 | TEMPERATURE: 98.3 F | RESPIRATION RATE: 18 BRPM

## 2020-01-17 DIAGNOSIS — I10 BENIGN ESSENTIAL HYPERTENSION: Primary | ICD-10-CM

## 2020-01-17 DIAGNOSIS — Z12.31 ENCOUNTER FOR SCREENING MAMMOGRAM FOR BREAST CANCER: ICD-10-CM

## 2020-01-17 DIAGNOSIS — Z12.11 SCREENING FOR COLON CANCER: ICD-10-CM

## 2020-01-17 DIAGNOSIS — Z13.1 SCREENING FOR DIABETES MELLITUS: ICD-10-CM

## 2020-01-17 DIAGNOSIS — S09.90XA INJURY OF HEAD, INITIAL ENCOUNTER: ICD-10-CM

## 2020-01-17 DIAGNOSIS — F41.9 ANXIETY: ICD-10-CM

## 2020-01-17 DIAGNOSIS — H53.2 DIPLOPIA: ICD-10-CM

## 2020-01-17 DIAGNOSIS — Z23 NEED FOR DIPHTHERIA-TETANUS-PERTUSSIS (TDAP) VACCINE: ICD-10-CM

## 2020-01-17 DIAGNOSIS — Z13.220 SCREENING FOR HYPERLIPIDEMIA: ICD-10-CM

## 2020-01-17 DIAGNOSIS — H92.13 EAR DRAINAGE, BILATERAL: ICD-10-CM

## 2020-01-17 LAB
ALBUMIN SERPL-MCNC: 4.7 G/DL (ref 3.5–5.7)
ALP SERPL-CCNC: 78 U/L (ref 34–104)
ALT SERPL W P-5'-P-CCNC: 18 U/L (ref 7–52)
ANION GAP SERPL CALCULATED.3IONS-SCNC: 8 MMOL/L (ref 3–14)
AST SERPL W P-5'-P-CCNC: 14 U/L (ref 13–39)
BILIRUB SERPL-MCNC: 0.4 MG/DL (ref 0.3–1)
BUN SERPL-MCNC: 15 MG/DL (ref 7–25)
CALCIUM SERPL-MCNC: 10.1 MG/DL (ref 8.6–10.3)
CHLORIDE SERPL-SCNC: 102 MMOL/L (ref 98–107)
CHOLEST SERPL-MCNC: 216 MG/DL
CO2 SERPL-SCNC: 27 MMOL/L (ref 21–31)
CREAT SERPL-MCNC: 0.76 MG/DL (ref 0.6–1.2)
ERYTHROCYTE [DISTWIDTH] IN BLOOD BY AUTOMATED COUNT: 13.5 % (ref 10–15)
GFR SERPL CREATININE-BSD FRML MDRD: 79 ML/MIN/{1.73_M2}
GLUCOSE SERPL-MCNC: 86 MG/DL (ref 70–105)
HCT VFR BLD AUTO: 45.1 % (ref 35–47)
HDLC SERPL-MCNC: 42 MG/DL (ref 23–92)
HGB BLD-MCNC: 15.1 G/DL (ref 11.7–15.7)
LDLC SERPL CALC-MCNC: 141 MG/DL
MCH RBC QN AUTO: 29.8 PG (ref 26.5–33)
MCHC RBC AUTO-ENTMCNC: 33.5 G/DL (ref 31.5–36.5)
MCV RBC AUTO: 89 FL (ref 78–100)
NONHDLC SERPL-MCNC: 174 MG/DL
PLATELET # BLD AUTO: 389 10E9/L (ref 150–450)
POTASSIUM SERPL-SCNC: 4 MMOL/L (ref 3.5–5.1)
PROT SERPL-MCNC: 8.1 G/DL (ref 6.4–8.9)
RBC # BLD AUTO: 5.06 10E12/L (ref 3.8–5.2)
SODIUM SERPL-SCNC: 137 MMOL/L (ref 134–144)
TRIGL SERPL-MCNC: 167 MG/DL
TSH SERPL DL<=0.05 MIU/L-ACNC: 2.01 IU/ML (ref 0.34–5.6)
WBC # BLD AUTO: 8.3 10E9/L (ref 4–11)

## 2020-01-17 PROCEDURE — 85027 COMPLETE CBC AUTOMATED: CPT | Mod: ZL | Performed by: INTERNAL MEDICINE

## 2020-01-17 PROCEDURE — 84443 ASSAY THYROID STIM HORMONE: CPT | Mod: ZL | Performed by: INTERNAL MEDICINE

## 2020-01-17 PROCEDURE — 99396 PREV VISIT EST AGE 40-64: CPT | Mod: 25 | Performed by: INTERNAL MEDICINE

## 2020-01-17 PROCEDURE — 80053 COMPREHEN METABOLIC PANEL: CPT | Mod: ZL | Performed by: INTERNAL MEDICINE

## 2020-01-17 PROCEDURE — 90715 TDAP VACCINE 7 YRS/> IM: CPT | Performed by: INTERNAL MEDICINE

## 2020-01-17 PROCEDURE — 90471 IMMUNIZATION ADMIN: CPT | Performed by: INTERNAL MEDICINE

## 2020-01-17 PROCEDURE — 80061 LIPID PANEL: CPT | Mod: ZL | Performed by: INTERNAL MEDICINE

## 2020-01-17 PROCEDURE — 36415 COLL VENOUS BLD VENIPUNCTURE: CPT | Mod: ZL | Performed by: INTERNAL MEDICINE

## 2020-01-17 RX ORDER — OMEGA-3-ACID ETHYL ESTERS 1 G/1
2 CAPSULE, LIQUID FILLED ORAL DAILY
COMMUNITY
Start: 2020-01-17 | End: 2021-01-14

## 2020-01-17 RX ORDER — AMLODIPINE BESYLATE 5 MG/1
5 TABLET ORAL 2 TIMES DAILY
Qty: 180 TABLET | Refills: 3 | Status: SHIPPED | OUTPATIENT
Start: 2020-01-17 | End: 2021-01-14

## 2020-01-17 RX ORDER — NEOMYCIN SULFATE, POLYMYXIN B SULFATE AND HYDROCORTISONE 10; 3.5; 1 MG/ML; MG/ML; [USP'U]/ML
4 SUSPENSION/ DROPS AURICULAR (OTIC) 3 TIMES DAILY
Qty: 10 ML | Refills: 4 | Status: SHIPPED | OUTPATIENT
Start: 2020-01-17 | End: 2021-01-14

## 2020-01-17 ASSESSMENT — PAIN SCALES - GENERAL: PAINLEVEL: NO PAIN (1)

## 2020-01-17 ASSESSMENT — MIFFLIN-ST. JEOR: SCORE: 1262.43

## 2020-01-17 NOTE — PROGRESS NOTES
Chief Complaint   Patient presents with     Annual Visit         HPI: Ms. Everton Farrell is a 54 year old female who presents today for yearly physical.  She overall is feeling good.      She has a history of hypertension.  She is currently on amlodipine along with lisinopril/hydrochlorothiazide for this.  She denies any side effects from the medications.    She has a history of tobacco use.  She has tried to quit smoking.  She previously tried Chantix which worked well however she gained 10 pounds as she quit smoking.  She now is back smoking and does have some lozenges and gum at home.  She does hope to cut back more.    She has had ongoing issues with anxiety.  She is on Cymbalta for this.  She feels that it has overall controlled some of her symptoms however she continues to have symptoms off and on.  She is interested in possibly trying something different.  We discussed today that her smoking is likely worsening anxiety in some ways.    She has one big concern today.  She [place]  She reports that she was trying to push a car out of the snow and fell and hit her head on the ground.  She has continued to have issues with a cloudy feeling.  She is having daily headaches.  Initially after her injury she was unable to walk her talk properly.  She had to lay on the ground for approximately 15 minutes after it occurred.  She has continued to have some issues with her eyes focusing.  She went back to work 4 days ago although is doing limited duties.  She denies any numbness, tingling or weakness in her arms or legs.    She has had some drainage from her ears bilaterally.  She denies any significant hearing changes.  This has been chronic and she is regularly using drops.    She is due for colon cancer screening, mammogram, screening for diabetes and hyperlipidemia.  She is due for a tetanus vaccine along with her shingles vaccine, Pneumovax and influenza vaccine.  She declines all vaccines  except for tetanus.  She was encouraged to quit smoking as this is her main risk factor for needing the pneumonia vaccine.  She has a history of hysterectomy.  She believes that her cervix was removed with this as she has not had a Pap smear in years.  No obvious records are available regarding this.    History is discussed and updated on 2020 with patient.  It is current to the best of my knowledge as below.    Past Medical History:   Diagnosis Date     Acquired hallux valgus of left foot     Bilateral hallux valgus     Anxiety and depression      Benign essential hypertension 12/3/2019     Chronic tension headaches 10/3/2012     Dermatitis     Dermatitis of the ear canals     Mixed hyperlipidemia 10/30/2018     Nicotine addiction 2018     Otitis externa         Past Surgical History:   Procedure Laterality Date      SECTION      2 C-Sections     COLONOSCOPY      within normal limits.     ECTOPIC PREGNANCY SURGERY      Two ectopic pregnancies; with one the left tube removed     HYSTERECTOMY TOTAL ABDOMINAL  2009    Total abdominal hysterectomy by Dr. 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 Carter         Current Outpatient Medications   Medication Sig Dispense Refill     amLODIPine (NORVASC) 5 MG tablet Take 1 tablet (5 mg) by mouth 2 times daily 180 tablet 3     DULoxetine (CYMBALTA) 60 MG capsule TAKE 1 CAPSULE BY MOUTH ONCE DAILY 90 capsule 3     lisinopril-hydrochlorothiazide (PRINZIDE/ZESTORETIC) 20-25 MG tablet TAKE 1 TABLET BY MOUTH ONCE DAILY 90 tablet 3     Misc Natural Products (LUTEIN 20) CAPS Take 1 capsule by mouth daily       neomycin-polymyxin-hydrocortisone (CORTISPORIN) 3.5-86718-9 otic suspension Place 4 drops into both ears 3 times daily 10 mL 4     nicotine (COMMIT) 2 MG lozenge Place 1 lozenge (2 mg)  inside cheek every hour as needed for smoking cessation 168 lozenge 5     nicotine (NICORETTE) 2 MG gum Place 1 each (2 mg) inside cheek as needed for smoking cessation 240 each 5     omega-3 acid ethyl esters (LOVAZA) 1 g capsule Take 2 capsules (2 g) by mouth daily         Allergies   Allergen Reactions     Atorvastatin Other (See Comments)     Bad stomach pain, nausea     Morphine Hives     Itchy rash at time of birth     Paroxetine Nausea        Family History   Problem Relation Age of Onset     Cancer Father         Abernathy's polyposis syndrome with father dying early of colon cancer.   at age 41 of Abernathy's polyposis with resulting colon cancer     Hypertension Mother      Hypertension Brother      Colon Cancer Brother         Abernathy's     No Known Problems Sister      Colon Cancer Brother         Abernathy's     Other - See Comments Other FHx        Family history of Abernathy's polyposis syndrome, negative genetic testing     Diabetes Maternal Grandmother         Diabetes,FHMaternal diabetes in the family     No Known Problems Daughter      No Known Problems Daughter        Family Status   Relation Name Status     Fa   at age 41        Abernathy's polyposis with resulting colon cancer.     Mo  Alive     Bro  Alive     Bro       Sis  Alive     Bro   at age 56     OTHER Father Abernathy's polyposis syndrome with father dying early of colon cancer.   at age 41 of Abernathy's polyposis with resulting colon cancer. (Not Specified)     MGMo FHx (Not Specified)        Social History     Tobacco Use     Smoking status: Current Every Day Smoker     Packs/day: 0.50     Years: 15.00     Pack years: 7.50     Types: Cigarettes     Smokeless tobacco: Never Used     Tobacco comment: Started smoking at age 38.  Typically 1 pack a day but cutting back    Substance Use Topics     Alcohol use: No     Alcohol/week: 0.0 standard drinks     Comment: Alcoholic Drinks/day: rare       Social History  "    Social History Narrative    She is remarried.  New 's name is Cecil Farrell.  2 daughters.  Adrienne (Maico), Amira (Michelle).    Works for [employer] and [employer].             ROS  GEN:-fevers/-chills/-night sweats/-wt change over time although has had 10 lb loss in past year  NEURO: +headaches/+vision changes  EARS: -hearing changes/-tinnitus  NOSE: -drainage/-congestion  MOUTH/THROAT: - sore throat/-dysphagia/-sores  LUNGS: -sob/-cough  CARDIOVASCULAR: -cp/-palpitations  GI: -pain/-change in bowels/-bloody stools  : -change in bladder/-vaginal discharge or bleeding  HEMATOLOGIC/LYMPHATIC: -swollen nodes  SKIN: -rashes/-lesions  MSK/RHEUM: -joint pain/-swelling/+falls  NEURO: -weakness/-parasthesias  PSYCH:-anhedonia/+depression/+anxiety     EXAM:   BP (!) 140/84 (BP Location: Right arm, Patient Position: Sitting, Cuff Size: Adult Regular)   Pulse 89   Temp 98.3  F (36.8  C) (Tympanic)   Resp 18   Ht 1.588 m (5' 2.5\")   Wt 71.1 kg (156 lb 12.8 oz)   SpO2 97%   Breastfeeding No   BMI 28.22 kg/m    Estimated body mass index is 28.22 kg/m  as calculated from the following:    Height as of this encounter: 1.588 m (5' 2.5\").    Weight as of this encounter: 71.1 kg (156 lb 12.8 oz).      GEN: Vitals reviewed. Healthy appearing. Patient is in no acute distress. Cooperative with exam.  HEENT: Normocephalic atraumatic.  Normal external eye, conjunctiva, lids, cornea with no scleral icterus or conjunctival erythema. Pupils equally round. Oropharynx with no erythema or exudates. Dentition adequate.  EACs clear bilat, TM gray with normal landmarks.  NECK: Supple; no thyromegaly or masses noted.  No cervical or supraclavicular lymphadenopathy.  CV: Heart regular in rate and rhythm with no murmur.    LUNGS: Lungs clear to auscultation bilaterally.  Chest rise equal bilaterally.  No accessory muscle use.  ABD:  Obese, Soft, nontender, and nondistended.  No rebound. Bowel sounds " positive.  SKIN: Warm and dry to touch.  No rash on face, arms and legs.  EXT: No clubbing or cyanosis.  No peripheral edema.  NEURO: Alert and oriented to person, place, and time.    Cranial nerves: Pupils equal, round, reactive to light and accomodation. Visual fields full. Extraocular muscles full but with diplopia to the left. Facial sensation to light touch normal. Facial strength symmetric. Hearing normal. Tongue and palate midline. Power of tongue normal. Shoulder shrug normal and symmetric.  SCM muscle tone normal.   Motor: Tone and strength normal and symmetric in distal and proximal BUE and BLE. No tremor.                  Sensory: Normal and symmetric to light touch.  Cerebellar: Rapid alternating movements of hands.  Normal gait.   MSK: ROM of upper and lower ext symmetric and full.  PSYCH:Affect appropriate. Speech fluent. Answers questions appropriately and thought process normal.       ASSESSMENT AND PLAN:    Benign essential hypertension  - Blood pressure today of (!) 140/84   is not at the goal of <140/90 with minimal exacerbation however may be related to recent acute trauma.  - Continue current regimen at this time and monitor.  Instructed to check BP at home.  - Cautioned patient to monitor with antibiotics, herbals and any OTC medications  - electrolytes and renal function done and okay  - amLODIPine (NORVASC) 5 MG tablet  Dispense: 180 tablet; Refill: 3  - CBC W PLT No Diff  - CBC W PLT No Diff    Screening for colon cancer  -Referral placed for colonoscopy.  - GASTROENTEROLOGY ADULT REF PROCEDURE ONLY Other; ([facility])    Need for diphtheria-tetanus-pertussis (Tdap) vaccine  Given today    Encounter for screening mammogram for breast cancer  - MA Screen Bilateral w/[facility]    Screening for diabetes mellitus  - Comprehensive Metabolic Panel    Screening for hyperlipidemia  - Lipid Profile    Injury of head, initial encounter  -With recent injury and ongoing symptoms along with abnormal exam and  high-dose fish oil putting her at increased risk of bleeding we will obtain a CT of the head.  She is encouraged to come in through the ER if she has any significant worsening.   - CT Head w/o Contrast    Anxiety  -Continue with current medication at this time.  We discussed that given her recent head trauma, time year and continued smoking that it may be best to wait until springtime to try to make any significant changes in her medication.  She agrees with this..  TSH obtained.  - TSH Reflex GH    Diplopia  -See above  - CT Head w/o Contrast    Ear drainage, bilateral  -Drops renewed  - neomycin-polymyxin-hydrocortisone (CORTISPORIN) 3.5-07704-6 otic suspension  Dispense: 10 mL; Refill: 4       Return in about 3 months (around 4/17/2020) for Recheck anxiety.      PAOLA BERG, DO   1/17/2020 9:18 AM    This document was prepared using voice generated softwear. While every attempt was made for accuracy, spelling and grammatical errors may exist.

## 2020-01-17 NOTE — NURSING NOTE
Patient presents to the clinic for annual visit, medication discussion and review.     Medication Reconciliation: complete   Ainsley Ann LPN............. January 17, 2020 9:07 AM

## 2020-01-17 NOTE — NURSING NOTE
Immunization Documentation    Prior to Immunization administration, verified patients identity using patient's name and date of birth. Please see IMMUNIZATIONS  and order for additional information.  Patient / Parent instructed to remain in clinic for 15 minutes and report any adverse reaction to staff immediately.    Was entire vial of medication used? Yes  Vial/Syringe: Naresh Ann LPN  1/17/2020   10:43 AM

## 2020-01-17 NOTE — PATIENT INSTRUCTIONS
Patient Education     Coping with Concussion  Concussion is also known as mild traumatic brain injury (MTBI). It is often caused by a blow to the head, or a fall. You may have been unconscious for a few seconds or minutes after the injury. Or maybe you were dazed, confused, or  saw stars.  After this, you thought you were OK. Now, weeks or months later, you re having symptoms that may be caused by a concussion. The good news is that, in most people,  these symptoms will likely go away on their own. Most people with a concussion recover fully, with no need for treatment.     A cold compress can help relieve a headache.    What is a concussion?  A concussion is a mild form of brain injury. In some cases, the effects of a concussion go away within days of the injury. In others, symptoms may continue for a few months. Fortunately, a concussion is temporary. Even when symptoms stay for months, they do go away over time. If they don't, or if your symptoms are worse, contact your healthcare provider.  Symptoms of a concussion  You may have noticed some of these symptoms:    Headaches    Irritability and other changes in behavior    Problems remembering or concentrating    Dizziness or lack of coordination    Fatigue    Problems sleeping    Sensitivity to light and sound    Vision changes  NOTE: If you have severe symptoms or trouble functioning, talk with your healthcare provider right away. If you had a more serious head injury than a concussion, you likely need treatment. Be sure to see your healthcare provider for an evaluation.  What you can do  Since the effects of a concussion go away over time, there isn t a lot you need to do. Be assured that this problem is temporary. You ll likely have a full recovery. In the meantime, talk with your healthcare provider about ways to relieve any symptoms that are bothering you. These tips may help:    Don't return to sports or any activity that could cause you to hit your head  until all symptoms are gone and you have been cleared by your doctor. A second head injury before fully recovering from the first one can lead to serious brain injury.    Return to normal activities of daily living and normal social interaction is encouraged to speed recovery.    Stress can make symptoms worse. Help calm yourself by resting in a quiet place and imagining a peaceful scene. Relax your muscles by soaking in a hot bath or taking a hot shower.    Take over-the-counter  acetaminophen to relieve headache pain. Take them as directed on the package. Don't take ibuprofen or aspirin after a head injury.    If you become dizzy, sit or lie down in a safe place until the sensation passes. Don t drive when you feel dizzy or disoriented.    If you re having trouble sleeping, try to keep a regular sleep schedule. Go to bed and get up at the same time each day. Avoid or limit caffeine and nicotine. Also don't drink alcohol. It may help you sleep at first, but your sleep will not be restful.    Give yourself time to heal. Your recovery will take some time. When you have symptoms, remember that you won t feel this way forever. In time the symptoms will go away and you ll be back to yourself.  If you re not feeling better  The effects of a concussion often go away in 7 to 10 days and the vast majority of people who have had a concussion have recovered after 3 months. If you re not feeling better as time passes, there may be something else going on. If your symptoms don t go away or you notice new ones, talk with your healthcare provider. He or she can help you get the treatment you need.  Date Last Reviewed: 1/1/2018 2000-2019 [employer]. [address] [place] [id number]. All rights reserved. This information is not intended as a substitute for professional medical care. Always follow your healthcare professional's instructions.

## 2020-01-23 ENCOUNTER — HOSPITAL ENCOUNTER (OUTPATIENT)
Dept: MAMMOGRAPHY | Facility: OTHER | Age: 55
Discharge: HOME | End: 2020-01-23
Attending: INTERNAL MEDICINE | Admitting: INTERNAL MEDICINE
Payer: COMMERCIAL

## 2020-01-23 ENCOUNTER — HOSPITAL ENCOUNTER (OUTPATIENT)
Dept: CT IMAGING | Facility: OTHER | Age: 55
End: 2020-01-23
Attending: INTERNAL MEDICINE
Payer: COMMERCIAL

## 2020-01-23 DIAGNOSIS — Z12.31 ENCOUNTER FOR SCREENING MAMMOGRAM FOR BREAST CANCER: ICD-10-CM

## 2020-01-23 DIAGNOSIS — H53.2 DIPLOPIA: ICD-10-CM

## 2020-01-23 DIAGNOSIS — S09.90XA INJURY OF HEAD, INITIAL ENCOUNTER: ICD-10-CM

## 2020-01-23 PROCEDURE — 77063 BREAST TOMOSYNTHESIS BI: CPT

## 2020-01-23 PROCEDURE — 70450 CT HEAD/BRAIN W/O DYE: CPT

## 2020-01-29 ENCOUNTER — HOSPITAL ENCOUNTER (OUTPATIENT)
Dept: MAMMOGRAPHY | Facility: OTHER | Age: 55
Discharge: HOME | End: 2020-01-29
Attending: INTERNAL MEDICINE | Admitting: INTERNAL MEDICINE
Payer: COMMERCIAL

## 2020-01-29 DIAGNOSIS — R92.8 ABNORMAL MAMMOGRAM: ICD-10-CM

## 2020-01-29 PROCEDURE — G0279 TOMOSYNTHESIS, MAMMO: HCPCS

## 2020-03-25 ENCOUNTER — OFFICE VISIT (OUTPATIENT)
Dept: FAMILY MEDICINE | Facility: OTHER | Age: 55
End: 2020-03-25
Attending: NURSE PRACTITIONER
Payer: COMMERCIAL

## 2020-03-25 VITALS
BODY MASS INDEX: 27.56 KG/M2 | OXYGEN SATURATION: 97 % | DIASTOLIC BLOOD PRESSURE: 76 MMHG | HEART RATE: 129 BPM | RESPIRATION RATE: 16 BRPM | SYSTOLIC BLOOD PRESSURE: 130 MMHG | TEMPERATURE: 96.7 F | WEIGHT: 153.13 LBS

## 2020-03-25 DIAGNOSIS — B02.30 HERPES ZOSTER WITH OPHTHALMIC COMPLICATION, UNSPECIFIED HERPES ZOSTER EYE DISEASE: Primary | ICD-10-CM

## 2020-03-25 PROCEDURE — 99214 OFFICE O/P EST MOD 30 MIN: CPT | Performed by: NURSE PRACTITIONER

## 2020-03-25 RX ORDER — VALACYCLOVIR HYDROCHLORIDE 1 G/1
1000 TABLET, FILM COATED ORAL 3 TIMES DAILY
Qty: 21 TABLET | Refills: 0 | Status: SHIPPED | OUTPATIENT
Start: 2020-03-25 | End: 2021-01-14

## 2020-03-25 ASSESSMENT — ANXIETY QUESTIONNAIRES
GAD7 TOTAL SCORE: 16
5. BEING SO RESTLESS THAT IT IS HARD TO SIT STILL: NEARLY EVERY DAY
6. BECOMING EASILY ANNOYED OR IRRITABLE: SEVERAL DAYS
7. FEELING AFRAID AS IF SOMETHING AWFUL MIGHT HAPPEN: SEVERAL DAYS
2. NOT BEING ABLE TO STOP OR CONTROL WORRYING: NEARLY EVERY DAY
1. FEELING NERVOUS, ANXIOUS, OR ON EDGE: NEARLY EVERY DAY
IF YOU CHECKED OFF ANY PROBLEMS ON THIS QUESTIONNAIRE, HOW DIFFICULT HAVE THESE PROBLEMS MADE IT FOR YOU TO DO YOUR WORK, TAKE CARE OF THINGS AT HOME, OR GET ALONG WITH OTHER PEOPLE: SOMEWHAT DIFFICULT
3. WORRYING TOO MUCH ABOUT DIFFERENT THINGS: MORE THAN HALF THE DAYS

## 2020-03-25 ASSESSMENT — PAIN SCALES - GENERAL: PAINLEVEL: NO PAIN (0)

## 2020-03-25 ASSESSMENT — PATIENT HEALTH QUESTIONNAIRE - PHQ9: 5. POOR APPETITE OR OVEREATING: NEARLY EVERY DAY

## 2020-03-25 NOTE — PROGRESS NOTES
HPI:    Juana Farrell is a 54 year old female  who presents to clinic today for rash.      Woke up yesterday morning with rash on left side of face, neck and scalp. Rash started on eyelid and scalp.  Rash hurts and is painful.  Low grade fever yesterday around 100 with sweats and chills.  Headaches are left sided along where the rash is located on the forehead and scalp for the past 4 days.  No vision change or vision loss.  Denies any light sensitivity.  Mild discomfort along outside/lateral side of eyeball with moving eyeball but no major pain or discomfort.  No eye drainage or crusting.   Wears glasses.  Left upper eyelid with swelling due to rash, reduced slightly with taking a warm shower.  States she had chicken pox as a child.  No documented shingles vaccine.  No cough.  Mild chronic nasal drainage.  Decreased appetite, attributes to stress.  Decreased energy the past few days, slept most of day today.  States she also fell 4 days ago but denies hitting her head, states she raf her body.  Very minimal neck achiness from the strain of falling, just even to notice it there but not enough to be overly bothersome.  States she had a concussion on 2020 from falling twice on ice while helping another car stuck on ice.    Occasional tylenol.        Past Medical History:   Diagnosis Date     Acquired hallux valgus of left foot     Bilateral hallux valgus     Anxiety and depression      Benign essential hypertension 12/3/2019     Chronic tension headaches 10/3/2012     Dermatitis     Dermatitis of the ear canals     Mixed hyperlipidemia 10/30/2018     Nicotine addiction 2018     Otitis externa      Past Surgical History:   Procedure Laterality Date      SECTION      2 C-Sections     COLONOSCOPY      within normal limits.     ECTOPIC PREGNANCY SURGERY      Two ectopic pregnancies; with one the left tube removed     HYSTERECTOMY TOTAL ABDOMINAL  2009    Total abdominal  hysterectomy by Dr. 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 Carter     Social History     Tobacco Use     Smoking status: Current Every Day Smoker     Packs/day: 0.50     Years: 15.00     Pack years: 7.50     Types: Cigarettes     Smokeless tobacco: Never Used     Tobacco comment: Started smoking at age 38.  Typically 1 pack a day but cutting back    Substance Use Topics     Alcohol use: No     Alcohol/week: 0.0 standard drinks     Comment: Alcoholic Drinks/day: rare     Current Outpatient Medications   Medication Sig Dispense Refill     amLODIPine (NORVASC) 5 MG tablet Take 1 tablet (5 mg) by mouth 2 times daily 180 tablet 3     DULoxetine (CYMBALTA) 60 MG capsule TAKE 1 CAPSULE BY MOUTH ONCE DAILY 90 capsule 3     lisinopril-hydrochlorothiazide (PRINZIDE/ZESTORETIC) 20-25 MG tablet TAKE 1 TABLET BY MOUTH ONCE DAILY 90 tablet 3     Misc Natural Products (LUTEIN 20) CAPS Take 1 capsule by mouth daily       neomycin-polymyxin-hydrocortisone (CORTISPORIN) 3.5-07971-3 otic suspension Place 4 drops into both ears 3 times daily 10 mL 4     nicotine (COMMIT) 2 MG lozenge Place 1 lozenge (2 mg) inside cheek every hour as needed for smoking cessation 168 lozenge 5     nicotine (NICORETTE) 2 MG gum Place 1 each (2 mg) inside cheek as needed for smoking cessation 240 each 5     omega-3 acid ethyl esters (LOVAZA) 1 g capsule Take 2 capsules (2 g) by mouth daily       Allergies   Allergen Reactions     Atorvastatin Other (See Comments)     Bad stomach pain, nausea     Morphine Hives     Itchy rash at time of birth     Paroxetine Nausea         Past medical history, past surgical history, current medications and allergies reviewed and accurate to the best of my knowledge.        ROS:  Refer to HPI    /76   Pulse 129   Temp 96.7  F (35.9  C) (Tympanic)   Resp  16   Wt 69.5 kg (153 lb 2 oz)   SpO2 97%   BMI 27.56 kg/m      EXAM:  General Appearance: miserable appearing adult female, non toxic appearance, appropriate appearance for age. No acute distress  Ears: Normal external ears, non tender.  Hearing intact to normal voice tone.    Eyes: Left bulbarconjunctivae with mild erythema along the lateral portion, cornea appears clear, no subconjunctival hemorrhage, no hypopyon, no hyphema, no noted foreign body, no drainage or crusting, left upper eyelid with swelling and erythematous vesicular rash, left lower eyelid without swelling or rash.  Right bulbar conjunctivae normal without erythema or irritation, cornea clear, no drainage or crusting, no eyelid swelling.  Pupils equal.  Orophayrnx:  voice clear.    Respiratory: normal chest wall and respirations.  Normal effort.  Clear to auscultation bilaterally, no wheezing, crackles or rhonchi.  No increased work of breathing.  No cough appreciated.  Cardiac: RRR with no murmurs   Musculoskeletal:  No cervical spine tenderness.  Normal ROM of neck without stiffness or guarding.  Equal movement of bilateral upper extremities.  Equal movement of bilateral lower extremities.  Normal gait.    Dermatological: Left side of forehead, left upper eyelid, left upper scalp with erythematous raised plaque like rash with overlying vesicles.  Rash does not cross midline.  No rash on nose.    Psychological: normal affect, alert, oriented, and pleasant.           ASSESSMENT/PLAN:  1. Herpes zoster with ophthalmic complication, unspecified herpes zoster eye disease    - valACYclovir (VALTREX) 1000 mg tablet; Take 1 tablet (1,000 mg) by mouth 3 times daily for 7 days  Dispense: 21 tablet; Refill: 0    I called and spoke with Dr. Contreras, ophthalmologist from [facility] regarding concerns of shingles near eye regarding whether I should initiate ophthalmic treatment or if she should be seen in eye clinic, Dr. Contreras states he will see her  in [facility] tomorrow 3/26/2020 at 9 am and will complete a detailed eye exam and treat accordingly.    Discussed with patient etiology of shingles, typical course of shingles, etc    May use over-the-counter Tylenol or ibuprofen PRN    Discussed warning signs/symptoms indicative of need to f/u    Follow up tomorrow with ophthalmologist for further evaluation of eye  Follow up with family practice if any concerns regarding rash      I explained my diagnostic considerations and recommendations to the patient, who voiced understanding and agreement with the treatment plan. All questions were answered. We discussed potential side effects of any prescribed or recommended therapies, as well as expectations for response to treatments.    Disclaimer:  This note consists of words and symbols derived from keyboarding, dictation, or using voice recognition software. As a result, there may be errors in the script that have gone undetected. Please consider this when interpreting information found in this note.

## 2020-03-25 NOTE — NURSING NOTE
"Chief Complaint   Patient presents with     Derm Problem     Fever     Fall     fell 4 days ago, headache      Patient presents today with a rash on her face, scalp and left side of neck.  She also fell 4 days ago and has had a soreness and slight headache.   Initial /76   Pulse 129   Temp 96.7  F (35.9  C) (Tympanic)   Resp 16   Wt 69.5 kg (153 lb 2 oz)   SpO2 97%   BMI 27.56 kg/m   Estimated body mass index is 27.56 kg/m  as calculated from the following:    Height as of 1/17/20: 1.588 m (5' 2.5\").    Weight as of this encounter: 69.5 kg (153 lb 2 oz).  Medication Reconciliation: complete    Ximena Friend LPN  "

## 2020-03-26 ASSESSMENT — ANXIETY QUESTIONNAIRES: GAD7 TOTAL SCORE: 16

## 2020-08-26 DIAGNOSIS — M25.511 RIGHT SHOULDER PAIN, UNSPECIFIED CHRONICITY: Primary | ICD-10-CM

## 2020-11-16 ENCOUNTER — HEALTH MAINTENANCE LETTER (OUTPATIENT)
Age: 55
End: 2020-11-16

## 2020-11-30 DIAGNOSIS — F41.1 GENERALIZED ANXIETY DISORDER: ICD-10-CM

## 2020-11-30 DIAGNOSIS — I10 ESSENTIAL HYPERTENSION: ICD-10-CM

## 2020-12-01 RX ORDER — LISINOPRIL AND HYDROCHLOROTHIAZIDE 20; 25 MG/1; MG/1
TABLET ORAL
Qty: 90 TABLET | Refills: 0 | Status: SHIPPED | OUTPATIENT
Start: 2020-12-01 | End: 2021-01-14

## 2020-12-01 RX ORDER — DULOXETIN HYDROCHLORIDE 60 MG/1
CAPSULE, DELAYED RELEASE ORAL
Qty: 90 CAPSULE | Refills: 0 | Status: SHIPPED | OUTPATIENT
Start: 2020-12-01 | End: 2021-01-14

## 2020-12-01 NOTE — TELEPHONE ENCOUNTER
Jim sent Rx request for the following:      LISINOPRIL-HCTZ 20/25MG TABLETS  Sig: TAKE 1 TABLET BY MOUTH ONCE DAILY      Last Prescription Date:   12/11/2019  Last Fill Qty/Refills:         90, R-3    Last Office Visit:              1/17/2020   Future Office visit:           none       DULOXETINE DR 60MG CAPSULES  Sig: TAKE 1 CAPSULE BY MOUTH ONCE DAILY      Last Prescription Date:   12/11/2019  Last Fill Qty/Refills:         90, R-3        Prescription refilled per RN Medication Refill Policy.................... Omero Vazquez RN ....................  12/1/2020   8:53 AM

## 2021-01-14 ENCOUNTER — OFFICE VISIT (OUTPATIENT)
Dept: INTERNAL MEDICINE | Facility: OTHER | Age: 56
End: 2021-01-14
Attending: INTERNAL MEDICINE
Payer: COMMERCIAL

## 2021-01-14 VITALS
RESPIRATION RATE: 18 BRPM | TEMPERATURE: 98.6 F | HEIGHT: 63 IN | OXYGEN SATURATION: 96 % | BODY MASS INDEX: 29.2 KG/M2 | SYSTOLIC BLOOD PRESSURE: 146 MMHG | DIASTOLIC BLOOD PRESSURE: 98 MMHG | WEIGHT: 164.8 LBS | HEART RATE: 96 BPM

## 2021-01-14 DIAGNOSIS — B02.30 HERPES ZOSTER WITH OPHTHALMIC COMPLICATION, UNSPECIFIED HERPES ZOSTER EYE DISEASE: ICD-10-CM

## 2021-01-14 DIAGNOSIS — H92.13 EAR DRAINAGE, BILATERAL: ICD-10-CM

## 2021-01-14 DIAGNOSIS — F41.1 GENERALIZED ANXIETY DISORDER: ICD-10-CM

## 2021-01-14 DIAGNOSIS — I10 ESSENTIAL HYPERTENSION: Primary | ICD-10-CM

## 2021-01-14 DIAGNOSIS — Z13.1 SCREENING FOR DIABETES MELLITUS: ICD-10-CM

## 2021-01-14 LAB
ALBUMIN SERPL-MCNC: 4.2 G/DL (ref 3.5–5.7)
ALP SERPL-CCNC: 77 U/L (ref 34–104)
ALT SERPL W P-5'-P-CCNC: 17 U/L (ref 7–52)
ANION GAP SERPL CALCULATED.3IONS-SCNC: 10 MMOL/L (ref 3–14)
AST SERPL W P-5'-P-CCNC: 13 U/L (ref 13–39)
BILIRUB SERPL-MCNC: 0.2 MG/DL (ref 0.3–1)
BUN SERPL-MCNC: 18 MG/DL (ref 7–25)
CALCIUM SERPL-MCNC: 9.3 MG/DL (ref 8.6–10.3)
CHLORIDE SERPL-SCNC: 104 MMOL/L (ref 98–107)
CO2 SERPL-SCNC: 26 MMOL/L (ref 21–31)
CREAT SERPL-MCNC: 0.82 MG/DL (ref 0.6–1.2)
GFR SERPL CREATININE-BSD FRML MDRD: 72 ML/MIN/{1.73_M2}
GLUCOSE SERPL-MCNC: 96 MG/DL (ref 70–105)
POTASSIUM SERPL-SCNC: 3.7 MMOL/L (ref 3.5–5.1)
PROT SERPL-MCNC: 6.8 G/DL (ref 6.4–8.9)
SODIUM SERPL-SCNC: 140 MMOL/L (ref 134–144)

## 2021-01-14 PROCEDURE — 80053 COMPREHEN METABOLIC PANEL: CPT | Mod: ZL | Performed by: INTERNAL MEDICINE

## 2021-01-14 PROCEDURE — 99214 OFFICE O/P EST MOD 30 MIN: CPT | Performed by: INTERNAL MEDICINE

## 2021-01-14 PROCEDURE — 36415 COLL VENOUS BLD VENIPUNCTURE: CPT | Mod: ZL | Performed by: INTERNAL MEDICINE

## 2021-01-14 RX ORDER — AMLODIPINE BESYLATE 5 MG/1
5 TABLET ORAL 2 TIMES DAILY
Qty: 180 TABLET | Refills: 2 | Status: SHIPPED | OUTPATIENT
Start: 2021-01-14 | End: 2021-08-09

## 2021-01-14 RX ORDER — DULOXETIN HYDROCHLORIDE 30 MG/1
30 CAPSULE, DELAYED RELEASE ORAL DAILY
Qty: 90 CAPSULE | Refills: 2 | Status: SHIPPED | OUTPATIENT
Start: 2021-01-14 | End: 2021-08-09

## 2021-01-14 RX ORDER — NEOMYCIN SULFATE, POLYMYXIN B SULFATE AND HYDROCORTISONE 10; 3.5; 1 MG/ML; MG/ML; [USP'U]/ML
4 SUSPENSION/ DROPS AURICULAR (OTIC) 3 TIMES DAILY
Qty: 10 ML | Refills: 4 | Status: SHIPPED | OUTPATIENT
Start: 2021-01-14 | End: 2023-10-02

## 2021-01-14 RX ORDER — LORAZEPAM 0.5 MG/1
.25-.5 TABLET ORAL 2 TIMES DAILY PRN
Qty: 45 TABLET | Refills: 1 | Status: SHIPPED | OUTPATIENT
Start: 2021-01-14 | End: 2022-09-19

## 2021-01-14 RX ORDER — LISINOPRIL AND HYDROCHLOROTHIAZIDE 20; 25 MG/1; MG/1
1 TABLET ORAL DAILY
Qty: 90 TABLET | Refills: 2 | Status: SHIPPED | OUTPATIENT
Start: 2021-01-14 | End: 2021-08-09

## 2021-01-14 RX ORDER — VALACYCLOVIR HYDROCHLORIDE 1 G/1
1000 TABLET, FILM COATED ORAL 3 TIMES DAILY
Qty: 21 TABLET | Refills: 3 | Status: SHIPPED | OUTPATIENT
Start: 2021-01-14 | End: 2021-08-09

## 2021-01-14 RX ORDER — DULOXETIN HYDROCHLORIDE 60 MG/1
60 CAPSULE, DELAYED RELEASE ORAL DAILY
Qty: 90 CAPSULE | Refills: 2 | Status: SHIPPED | OUTPATIENT
Start: 2021-01-14 | End: 2021-08-09

## 2021-01-14 ASSESSMENT — ANXIETY QUESTIONNAIRES
3. WORRYING TOO MUCH ABOUT DIFFERENT THINGS: SEVERAL DAYS
1. FEELING NERVOUS, ANXIOUS, OR ON EDGE: NEARLY EVERY DAY
6. BECOMING EASILY ANNOYED OR IRRITABLE: NOT AT ALL
5. BEING SO RESTLESS THAT IT IS HARD TO SIT STILL: NEARLY EVERY DAY
IF YOU CHECKED OFF ANY PROBLEMS ON THIS QUESTIONNAIRE, HOW DIFFICULT HAVE THESE PROBLEMS MADE IT FOR YOU TO DO YOUR WORK, TAKE CARE OF THINGS AT HOME, OR GET ALONG WITH OTHER PEOPLE: VERY DIFFICULT
GAD7 TOTAL SCORE: 15
7. FEELING AFRAID AS IF SOMETHING AWFUL MIGHT HAPPEN: MORE THAN HALF THE DAYS
2. NOT BEING ABLE TO STOP OR CONTROL WORRYING: NEARLY EVERY DAY

## 2021-01-14 ASSESSMENT — PAIN SCALES - GENERAL: PAINLEVEL: NO PAIN (0)

## 2021-01-14 ASSESSMENT — MIFFLIN-ST. JEOR: SCORE: 1293.72

## 2021-01-14 ASSESSMENT — PATIENT HEALTH QUESTIONNAIRE - PHQ9
5. POOR APPETITE OR OVEREATING: NEARLY EVERY DAY
SUM OF ALL RESPONSES TO PHQ QUESTIONS 1-9: 19

## 2021-01-14 NOTE — NURSING NOTE
Patient presents to clinic today for medication refills.  She is dealing with a lot of stress with her 's terminal illness and has arthritis pains in smaller joints, especially hands and toes.  Medication reconciliation completed.  Cindy Luz CMA([facility])..................1/14/2021   3:58 PM

## 2021-01-14 NOTE — PROGRESS NOTES
"Chief Complaint   Patient presents with     Recheck Medication         HPI: Ms. Everton Farrell is a 55 year old female who presents today for follow up of medications.    She is having increased anxiety as her  was recently diagnosed with metastatic cancer.  She has had difficulty dealing with this off and on.  She is currently on Cymbalta 60 mg daily.  She does have moments where she feels overwhelmed and have moments of panic.    She has a history of hypertension.  Her blood pressure is little elevated today.  She is on amlodipine twice daily along with lisinopril daily.  We discussed that her elevated blood pressure is not unusual given the current situation.    She has a history of herpes zoster with ophthalmic involvement.  She has been having some outbreaks off and on.  She does need a renewal of her Valtrex.    She also needs a renewal of eardrops which have worked for ear drainage in the past.    She  reports that she has been smoking cigarettes. She has a 7.50 pack-year smoking history. She has never used smokeless tobacco.    Past medical history reviewed as below:     Past Medical History:   Diagnosis Date     Acquired hallux valgus of left foot     Bilateral hallux valgus     Anxiety and depression 1995     Benign essential hypertension 12/3/2019     Chronic tension headaches 10/3/2012     Dermatitis     Dermatitis of the ear canals     Mixed hyperlipidemia 10/30/2018     Nicotine addiction 1/18/2018     Otitis externa    .      ROS  Pertinent ROS was performed and was negative, including for fever, chills, chest pain, shortness of breath, increased lower extremity edema, changes in bowel or bladder, blood in the stool. No other concerns, with exception of HPI above.      EXAM:   BP (!) 146/98 (BP Location: Right arm, Patient Position: Sitting, Cuff Size: Adult Regular)   Pulse 96   Temp 98.6  F (37  C) (Tympanic)   Resp 18   Ht 1.588 m (5' 2.5\")   Wt 74.8 kg (164 lb 12.8 oz)   SpO2 96%   " Patient monitored closely during hospitalization. He was initiated on IV fluids, IV azithromycin and Rocephin, and antipyretics. Sputum culture negative.GI consulted for epigastric pain. He underwent upper GI with findings of mild gastritis.  He is feeling better this am and is stable for DC.     PE; chest Coarse   "BMI 29.66 kg/m      Estimated body mass index is 29.66 kg/m  as calculated from the following:    Height as of this encounter: 1.588 m (5' 2.5\").    Weight as of this encounter: 74.8 kg (164 lb 12.8 oz).      GEN: Vitals reviewed. Healthy appearing. Patient is in no acute distress. Cooperative with exam.  HEENT: Normocephalic atraumatic.  Eyes grossly normal to inspection.  No discharge or erythema, or obvious scleral/conjunctival abnormalities.   NECK: Supple; no thyromegaly or masses noted.  No cervical or supraclavicular lymphadenopathy.  CV: Heart regular in rate and rhythm with no murmur.    LUNGS: No audible wheeze, cough, or visible cyanosis.  No visible retractions or increased work of breathing.  Lungs clear to auscultation bilaterally.    ABD: Nondistended  SKIN: Warm and dry to touch.  Visible skin clear. No significant rash, abnormal pigmentation or lesions.  EXT: No clubbing or cyanosis.  No peripheral edema.  PSYCH: Mood is sad/anxious.  Mentation appears normal, affect normal/bright, judgement and insight intact, normal speech and appearance well-groomed.     ASSESSMENT AND PLAN:    Ear drainage, bilateral  Controlled, med renewed  - neomycin-polymyxin-hydrocortisone (CORTISPORIN) 3.5-11855-5 otic suspension  Dispense: 10 mL; Refill: 4    Herpes zoster with ophthalmic complication, unspecified herpes zoster eye disease  -Renewal sent in.  She was informed that she can take it prophylactically if needed.  - valACYclovir (VALTREX) 1000 mg tablet  Dispense: 21 tablet; Refill: 3    Generalized anxiety disorder  -Given her recent stress and anxiety we will increase her Cymbalta to 90 mg daily.  Additionally she will provided lorazepam to use as needed.  She is to call with any significant side effects.  We will follow-up in approximately 6 to 8 weeks.  - DULoxetine (CYMBALTA) 60 MG capsule  Dispense: 90 capsule; Refill: 2  - DULoxetine (CYMBALTA) 30 MG capsule  Dispense: 90 capsule; Refill: 2  - LORazepam " (ATIVAN) 0.5 MG tablet  Dispense: 45 tablet; Refill: 1    Essential hypertension  - Blood pressure today of (!) 146/98   Is not at the goal of <140/90 with mild exacerbation which likely due to recent stress  - Continue current regimen and instructed to check BP at home.  - Cautioned patient to monitor with antibiotics, herbals and any OTC medications  - electrolytes and renal function done and ok  - lisinopril-hydrochlorothiazide (ZESTORETIC) 20-25 MG tablet  Dispense: 90 tablet; Refill: 2  - amLODIPine (NORVASC) 5 MG tablet  Dispense: 180 tablet; Refill: 2    Screening for diabetes mellitus  - Comprehensive Metabolic Panel         Return in about 6 weeks (around 2/25/2021) for Recheck, medications.      PAOLA BERG DO   1/14/2021 4:15 PM    This document was prepared using voice generated softwear. While every attempt was made for accuracy, grammatical errors may exist.

## 2021-01-15 ASSESSMENT — ANXIETY QUESTIONNAIRES: GAD7 TOTAL SCORE: 15

## 2021-03-04 ENCOUNTER — VIRTUAL VISIT (OUTPATIENT)
Dept: INTERNAL MEDICINE | Facility: OTHER | Age: 56
End: 2021-03-04
Attending: INTERNAL MEDICINE
Payer: COMMERCIAL

## 2021-03-04 ENCOUNTER — TELEPHONE (OUTPATIENT)
Dept: INTERNAL MEDICINE | Facility: OTHER | Age: 56
End: 2021-03-04

## 2021-03-04 DIAGNOSIS — I10 ESSENTIAL HYPERTENSION: ICD-10-CM

## 2021-03-04 DIAGNOSIS — F41.1 GENERALIZED ANXIETY DISORDER: Primary | ICD-10-CM

## 2021-03-04 DIAGNOSIS — F43.9 SITUATIONAL STRESS: ICD-10-CM

## 2021-03-04 PROCEDURE — 99212 OFFICE O/P EST SF 10 MIN: CPT | Mod: 95 | Performed by: INTERNAL MEDICINE

## 2021-03-04 ASSESSMENT — ANXIETY QUESTIONNAIRES
3. WORRYING TOO MUCH ABOUT DIFFERENT THINGS: NOT AT ALL
IF YOU CHECKED OFF ANY PROBLEMS ON THIS QUESTIONNAIRE, HOW DIFFICULT HAVE THESE PROBLEMS MADE IT FOR YOU TO DO YOUR WORK, TAKE CARE OF THINGS AT HOME, OR GET ALONG WITH OTHER PEOPLE: NOT DIFFICULT AT ALL
7. FEELING AFRAID AS IF SOMETHING AWFUL MIGHT HAPPEN: SEVERAL DAYS
1. FEELING NERVOUS, ANXIOUS, OR ON EDGE: NEARLY EVERY DAY
GAD7 TOTAL SCORE: 10
6. BECOMING EASILY ANNOYED OR IRRITABLE: NOT AT ALL
2. NOT BEING ABLE TO STOP OR CONTROL WORRYING: NEARLY EVERY DAY
5. BEING SO RESTLESS THAT IT IS HARD TO SIT STILL: NOT AT ALL

## 2021-03-04 ASSESSMENT — PATIENT HEALTH QUESTIONNAIRE - PHQ9: 5. POOR APPETITE OR OVEREATING: NEARLY EVERY DAY

## 2021-03-04 NOTE — NURSING NOTE
Patient requests telephone visit today for follow up on her blood pressure.  Medication list reviewed.  Cindy Luz CMA([facility])..................3/4/2021   12:35 PM

## 2021-03-04 NOTE — PROGRESS NOTES
Juana is a 55 year old who is being evaluated via a billable telephone visit.      What phone number would you like to be contacted at? [phone]  How would you like to obtain your AVS? Riri    Assessment & Plan     1. Generalized anxiety disorder  -Continue on current dosing of Cymbalta.  At some point she will need to bring this back down to 60 mg daily and can do it at her convenience or call for instructions.    2. Essential hypertension  -She is to start monitoring. She is to watch for signs of hypertension.  She is to call if it remains elevated.    3. Situational stress  - call if she needs anything    Follow up as needed    Olya Loomis, DO  [facility]    Subjective   Juana is a 55 year old who presents for the following health issues     She still feels on edge but isn't crying all the time.  She has only needed the lorazepam 3 times.  Her  is on hospice.      She has not been checking her BP regularly.  She has not had any headaches or vision      Review of Systems   Denies any fevers, chills, SOB, chest pain, increased lower extremity edema, changes in bowel or bladder or other concerns.         Objective           Vitals:  No vitals were obtained today due to virtual visit.    Physical Exam   healthy, alert and no distress  PSYCH: Alert and oriented times 3; coherent speech, normal   rate and volume, able to articulate logical thoughts, able   to abstract reason, no tangential thoughts, no hallucinations   or delusions  Her affect is normal  RESP: No cough, no audible wheezing, able to talk in full sentences  Remainder of exam unable to be completed due to telephone visits            Phone call duration: 5 minutes

## 2021-03-05 ASSESSMENT — ANXIETY QUESTIONNAIRES: GAD7 TOTAL SCORE: 10

## 2021-04-03 ENCOUNTER — HEALTH MAINTENANCE LETTER (OUTPATIENT)
Age: 56
End: 2021-04-03

## 2021-08-08 PROBLEM — Z13.29 SCREENING FOR THYROID DISORDER: Status: ACTIVE | Noted: 2021-08-08

## 2021-08-08 PROBLEM — R92.8 ABNORMAL MAMMOGRAM: Status: ACTIVE | Noted: 2021-08-08

## 2021-08-08 PROBLEM — F41.9 ANXIETY: Status: ACTIVE | Noted: 2021-08-08

## 2021-08-08 PROBLEM — Z78.0 ASYMPTOMATIC POSTMENOPAUSAL STATUS: Status: ACTIVE | Noted: 2021-08-08

## 2021-08-08 PROBLEM — Z13.220 SCREENING FOR HYPERLIPIDEMIA: Status: ACTIVE | Noted: 2021-08-08

## 2021-08-08 PROBLEM — Z72.0 TOBACCO USE: Status: ACTIVE | Noted: 2021-08-08

## 2021-08-08 PROBLEM — Z13.1 SCREENING FOR DIABETES MELLITUS: Status: ACTIVE | Noted: 2021-08-08

## 2021-08-08 PROBLEM — Z12.11 SCREENING FOR COLON CANCER: Status: ACTIVE | Noted: 2021-08-08

## 2021-08-08 PROBLEM — I10 ESSENTIAL HYPERTENSION: Status: ACTIVE | Noted: 2019-12-03

## 2021-08-09 ENCOUNTER — HOSPITAL ENCOUNTER (OUTPATIENT)
Dept: GENERAL RADIOLOGY | Facility: OTHER | Age: 56
End: 2021-08-09
Attending: NURSE PRACTITIONER
Payer: COMMERCIAL

## 2021-08-09 ENCOUNTER — OFFICE VISIT (OUTPATIENT)
Dept: INTERNAL MEDICINE | Facility: OTHER | Age: 56
End: 2021-08-09
Attending: NURSE PRACTITIONER
Payer: COMMERCIAL

## 2021-08-09 VITALS
OXYGEN SATURATION: 97 % | HEART RATE: 86 BPM | DIASTOLIC BLOOD PRESSURE: 100 MMHG | WEIGHT: 162.8 LBS | TEMPERATURE: 97.8 F | BODY MASS INDEX: 28.84 KG/M2 | RESPIRATION RATE: 18 BRPM | HEIGHT: 63 IN | SYSTOLIC BLOOD PRESSURE: 142 MMHG

## 2021-08-09 DIAGNOSIS — Z13.220 SCREENING FOR HYPERLIPIDEMIA: ICD-10-CM

## 2021-08-09 DIAGNOSIS — R92.8 ABNORMAL MAMMOGRAM: ICD-10-CM

## 2021-08-09 DIAGNOSIS — Z72.0 TOBACCO USE: ICD-10-CM

## 2021-08-09 DIAGNOSIS — Z13.29 SCREENING FOR THYROID DISORDER: ICD-10-CM

## 2021-08-09 DIAGNOSIS — Z78.0 ASYMPTOMATIC POSTMENOPAUSAL STATUS: ICD-10-CM

## 2021-08-09 DIAGNOSIS — R10.31 RLQ ABDOMINAL PAIN: ICD-10-CM

## 2021-08-09 DIAGNOSIS — Z13.1 SCREENING FOR DIABETES MELLITUS: ICD-10-CM

## 2021-08-09 DIAGNOSIS — R31.29 OTHER MICROSCOPIC HEMATURIA: ICD-10-CM

## 2021-08-09 DIAGNOSIS — Z11.59 ENCOUNTER FOR HEPATITIS C SCREENING TEST FOR LOW RISK PATIENT: ICD-10-CM

## 2021-08-09 DIAGNOSIS — F41.9 ANXIETY: ICD-10-CM

## 2021-08-09 DIAGNOSIS — F41.1 GENERALIZED ANXIETY DISORDER: Primary | ICD-10-CM

## 2021-08-09 DIAGNOSIS — F17.219 CIGARETTE NICOTINE DEPENDENCE WITH NICOTINE-INDUCED DISORDER: ICD-10-CM

## 2021-08-09 DIAGNOSIS — Z63.4 DEATH OF HUSBAND: Chronic | ICD-10-CM

## 2021-08-09 DIAGNOSIS — E78.2 MIXED HYPERLIPIDEMIA: ICD-10-CM

## 2021-08-09 DIAGNOSIS — B02.30 HERPES ZOSTER WITH OPHTHALMIC COMPLICATION, UNSPECIFIED HERPES ZOSTER EYE DISEASE: ICD-10-CM

## 2021-08-09 DIAGNOSIS — Z12.11 SCREENING FOR COLON CANCER: ICD-10-CM

## 2021-08-09 DIAGNOSIS — G44.229 CHRONIC TENSION-TYPE HEADACHE, NOT INTRACTABLE: ICD-10-CM

## 2021-08-09 DIAGNOSIS — I10 ESSENTIAL HYPERTENSION: ICD-10-CM

## 2021-08-09 LAB
ALBUMIN SERPL-MCNC: 4.3 G/DL (ref 3.5–5.7)
ALBUMIN UR-MCNC: NEGATIVE MG/DL
ALP SERPL-CCNC: 96 U/L (ref 34–104)
ALT SERPL W P-5'-P-CCNC: 37 U/L (ref 7–52)
ANION GAP SERPL CALCULATED.3IONS-SCNC: 9 MMOL/L (ref 3–14)
APPEARANCE UR: CLEAR
AST SERPL W P-5'-P-CCNC: 21 U/L (ref 13–39)
BACTERIA #/AREA URNS HPF: ABNORMAL /HPF
BASOPHILS # BLD AUTO: 0.1 10E3/UL (ref 0–0.2)
BASOPHILS NFR BLD AUTO: 1 %
BILIRUB SERPL-MCNC: 0.6 MG/DL (ref 0.3–1)
BILIRUB UR QL STRIP: NEGATIVE
BUN SERPL-MCNC: 18 MG/DL (ref 7–25)
CALCIUM SERPL-MCNC: 10 MG/DL (ref 8.6–10.3)
CHLORIDE BLD-SCNC: 102 MMOL/L (ref 98–107)
CHOLEST SERPL-MCNC: 233 MG/DL
CO2 SERPL-SCNC: 25 MMOL/L (ref 21–31)
COLOR UR AUTO: ABNORMAL
CREAT SERPL-MCNC: 0.87 MG/DL (ref 0.6–1.2)
EOSINOPHIL # BLD AUTO: 0.1 10E3/UL (ref 0–0.7)
EOSINOPHIL NFR BLD AUTO: 2 %
ERYTHROCYTE [DISTWIDTH] IN BLOOD BY AUTOMATED COUNT: 13.1 % (ref 10–15)
FASTING STATUS PATIENT QL REPORTED: ABNORMAL
GFR SERPL CREATININE-BSD FRML MDRD: 74 ML/MIN/1.73M2
GLUCOSE BLD-MCNC: 91 MG/DL (ref 70–105)
GLUCOSE UR STRIP-MCNC: NEGATIVE MG/DL
HBA1C MFR BLD: 5.5 % (ref 4–6.2)
HCT VFR BLD AUTO: 44.9 % (ref 35–47)
HDLC SERPL-MCNC: 40 MG/DL (ref 23–92)
HGB BLD-MCNC: 15 G/DL (ref 11.7–15.7)
HGB UR QL STRIP: ABNORMAL
IMM GRANULOCYTES # BLD: 0 10E3/UL
IMM GRANULOCYTES NFR BLD: 0 %
KETONES UR STRIP-MCNC: NEGATIVE MG/DL
LDLC SERPL CALC-MCNC: 166 MG/DL
LEUKOCYTE ESTERASE UR QL STRIP: NEGATIVE
LYMPHOCYTES # BLD AUTO: 1.9 10E3/UL (ref 0.8–5.3)
LYMPHOCYTES NFR BLD AUTO: 27 %
MCH RBC QN AUTO: 29.5 PG (ref 26.5–33)
MCHC RBC AUTO-ENTMCNC: 33.4 G/DL (ref 31.5–36.5)
MCV RBC AUTO: 88 FL (ref 78–100)
MONOCYTES # BLD AUTO: 0.6 10E3/UL (ref 0–1.3)
MONOCYTES NFR BLD AUTO: 9 %
MUCOUS THREADS #/AREA URNS LPF: PRESENT /LPF
NEUTROPHILS # BLD AUTO: 4.4 10E3/UL (ref 1.6–8.3)
NEUTROPHILS NFR BLD AUTO: 61 %
NITRATE UR QL: NEGATIVE
NONHDLC SERPL-MCNC: 193 MG/DL
NRBC # BLD AUTO: 0 10E3/UL
NRBC BLD AUTO-RTO: 0 /100
PH UR STRIP: 5.5 [PH] (ref 5–9)
PLATELET # BLD AUTO: 348 10E3/UL (ref 150–450)
POTASSIUM BLD-SCNC: 4.1 MMOL/L (ref 3.5–5.1)
PROT SERPL-MCNC: 7.9 G/DL (ref 6.4–8.9)
RBC # BLD AUTO: 5.09 10E6/UL (ref 3.8–5.2)
RBC URINE: 1 /HPF
SODIUM SERPL-SCNC: 136 MMOL/L (ref 134–144)
SP GR UR STRIP: 1.02 (ref 1–1.03)
TRIGL SERPL-MCNC: 135 MG/DL
TSH SERPL DL<=0.005 MIU/L-ACNC: 2.02 MU/L (ref 0.4–4)
UROBILINOGEN UR STRIP-MCNC: NORMAL MG/DL
WBC # BLD AUTO: 7.1 10E3/UL (ref 4–11)
WBC URINE: 1 /HPF

## 2021-08-09 PROCEDURE — 86803 HEPATITIS C AB TEST: CPT | Mod: ZL | Performed by: NURSE PRACTITIONER

## 2021-08-09 PROCEDURE — 74019 RADEX ABDOMEN 2 VIEWS: CPT

## 2021-08-09 PROCEDURE — 83036 HEMOGLOBIN GLYCOSYLATED A1C: CPT | Mod: ZL | Performed by: NURSE PRACTITIONER

## 2021-08-09 PROCEDURE — 85025 COMPLETE CBC W/AUTO DIFF WBC: CPT | Mod: ZL | Performed by: NURSE PRACTITIONER

## 2021-08-09 PROCEDURE — 84443 ASSAY THYROID STIM HORMONE: CPT | Mod: ZL | Performed by: NURSE PRACTITIONER

## 2021-08-09 PROCEDURE — 80061 LIPID PANEL: CPT | Mod: ZL | Performed by: NURSE PRACTITIONER

## 2021-08-09 PROCEDURE — 36415 COLL VENOUS BLD VENIPUNCTURE: CPT | Mod: ZL | Performed by: NURSE PRACTITIONER

## 2021-08-09 PROCEDURE — 81003 URINALYSIS AUTO W/O SCOPE: CPT | Mod: ZL | Performed by: NURSE PRACTITIONER

## 2021-08-09 PROCEDURE — 99396 PREV VISIT EST AGE 40-64: CPT | Performed by: NURSE PRACTITIONER

## 2021-08-09 PROCEDURE — 82040 ASSAY OF SERUM ALBUMIN: CPT | Mod: ZL | Performed by: NURSE PRACTITIONER

## 2021-08-09 RX ORDER — AMLODIPINE BESYLATE 5 MG/1
5 TABLET ORAL 2 TIMES DAILY
Qty: 180 TABLET | Refills: 2 | COMMUNITY
Start: 2021-08-09 | End: 2022-02-11

## 2021-08-09 RX ORDER — LISINOPRIL AND HYDROCHLOROTHIAZIDE 20; 25 MG/1; MG/1
1 TABLET ORAL DAILY
Qty: 90 TABLET | Refills: 2 | Status: SHIPPED | OUTPATIENT
Start: 2021-08-09 | End: 2022-05-11

## 2021-08-09 RX ORDER — DULOXETIN HYDROCHLORIDE 60 MG/1
60 CAPSULE, DELAYED RELEASE ORAL DAILY
Qty: 90 CAPSULE | Refills: 2 | Status: SHIPPED | OUTPATIENT
Start: 2021-08-09 | End: 2021-11-26

## 2021-08-09 RX ORDER — VALACYCLOVIR HYDROCHLORIDE 1 G/1
1000 TABLET, FILM COATED ORAL 3 TIMES DAILY
Qty: 21 TABLET | Refills: 3 | Status: SHIPPED | OUTPATIENT
Start: 2021-08-09 | End: 2023-10-02

## 2021-08-09 SDOH — SOCIAL STABILITY - SOCIAL INSECURITY: DISSAPEARANCE AND DEATH OF FAMILY MEMBER: Z63.4

## 2021-08-09 ASSESSMENT — ENCOUNTER SYMPTOMS
FATIGUE: 1
ARTHRALGIAS: 1
SLEEP DISTURBANCE: 1
ABDOMINAL PAIN: 1

## 2021-08-09 ASSESSMENT — MIFFLIN-ST. JEOR: SCORE: 1279.65

## 2021-08-09 ASSESSMENT — PATIENT HEALTH QUESTIONNAIRE - PHQ9
SUM OF ALL RESPONSES TO PHQ QUESTIONS 1-9: 4
SUM OF ALL RESPONSES TO PHQ QUESTIONS 1-9: 4
10. IF YOU CHECKED OFF ANY PROBLEMS, HOW DIFFICULT HAVE THESE PROBLEMS MADE IT FOR YOU TO DO YOUR WORK, TAKE CARE OF THINGS AT HOME, OR GET ALONG WITH OTHER PEOPLE: NOT DIFFICULT AT ALL

## 2021-08-09 ASSESSMENT — PAIN SCALES - GENERAL: PAINLEVEL: MILD PAIN (3)

## 2021-08-09 NOTE — NURSING NOTE
"Chief Complaint   Patient presents with     Physical     Annual well check   Patient presents for well physical; patient states that she has had a pain in lower right abdomen off and on for about 6-8 months continuously, feels like a pressure and pain is 3/10 today.  Patient states that sometimes pain moves to right leg and also radiates to her back at times.    Initial BP (!) 142/94 (BP Location: Right arm, Patient Position: Sitting, Cuff Size: Adult Regular)   Pulse 86   Temp 97.8  F (36.6  C) (Tympanic)   Resp 18   Ht 1.588 m (5' 2.5\")   Wt 73.8 kg (162 lb 12.8 oz)   LMP  (LMP Unknown)   SpO2 97%   Breastfeeding No   BMI 29.30 kg/m   Estimated body mass index is 29.3 kg/m  as calculated from the following:    Height as of this encounter: 1.588 m (5' 2.5\").    Weight as of this encounter: 73.8 kg (162 lb 12.8 oz).  Medication Reconciliation: complete  FOOD SECURITY SCREENING QUESTIONS  Hunger Vital Signs:  Within the past 12 months we worried whether our food would run out before we got money to buy more. Never  Within the past 12 months the food we bought just didn't last and we didn't have money to get more. Never      Lexus Schofield LPN    "

## 2021-08-09 NOTE — PROGRESS NOTES
"Juana Farrell  : 1965 Age: 56 year old Sex: female MRN: [id number]    CC:   Chief Complaint   Patient presents with     Physical     Annual well check     Answers for HPI/ROS submitted by the patient on 2021  If you checked off any problems, how difficult have these problems made it for you to do your work, take care of things at home, or get along with other people?: Not difficult at all  PHQ9 TOTAL SCORE: 4    CARMINA Score:    CARMINA-7 SCORE 3/25/2020 2021 3/4/2021   Total Score 16 15 10     PHQ-2 Score:     PHQ-2 (  [employer]) 3/4/2021 3/25/2020   Q1: Little interest or pleasure in doing things 0 0   Q2: Feeling down, depressed or hopeless 1 0   PHQ-2 Score 1 0          Tobacco Use      Smoking status: Current Every Day Smoker        Packs/day: 0.50        Years: 15.00        Pack years: 7.5        Types: Cigarettes      Smokeless tobacco: Never Used      Tobacco comment: Started smoking at age 38.  Typically 1 pack a day but cutting back     NURSE'S NOTES:    Nursing Notes:   Lexus Schofield LPN  2021  8:24 AM  Signed  Chief Complaint   Patient presents with     Physical     Annual well check   Patient presents for well physical; patient states that she has had a pain in lower right abdomen off and on for about 6-8 months continuously, feels like a pressure and pain is 3/10 today.  Patient states that sometimes pain moves to right leg and also radiates to her back at times.    Initial BP (!) 142/94 (BP Location: Right arm, Patient Position: Sitting, Cuff Size: Adult Regular)   Pulse 86   Temp 97.8  F (36.6  C) (Tympanic)   Resp 18   Ht 1.588 m (5' 2.5\")   Wt 73.8 kg (162 lb 12.8 oz)   LMP  (LMP Unknown)   SpO2 97%   Breastfeeding No   BMI 29.30 kg/m   Estimated body mass index is 29.3 kg/m  as calculated from the following:    Height as of this encounter: 1.588 m (5' 2.5\").    Weight as of this encounter: 73.8 kg (162 lb 12.8 oz).  Medication Reconciliation: complete  FOOD " "SECURITY SCREENING QUESTIONS  Hunger Vital Signs:  Within the past 12 months we worried whether our food would run out before we got money to buy more. Never  Within the past 12 months the food we bought just didn't last and we didn't have money to get more. Never      Lexus Schofield LPN    REVIEW OF RECOMMENDED SCREENINGS:    Colonoscopy: Ordered for today.  Breast Exam/Mammography: Done in 1/29/2020 Mammo w/Ultrasound and indicated breast densities, repeat annually was recommended.  Pap smear: no longer needed.  DEXA: Never done. Ordered for today.   Immunizations: Due for Pneumococcal, Shingrix vaccine.  UTD on other vaccines.   Lipids/Annual Exam: Ordered for today.  Hepatitis C screen: Never done. Ordered for today.    Nursing note reviewed with patient.  Accuracy and completeness verified.      SUBJECTIVE:                                                      HPI:   Juana Farrell presents to the clinic today for annual physical and recommended screenings.    Continuous aching in right lower abdomen. Cramping down into right leg sometimes, last night felt it in her back. History of ovarian cysts in the past, does still have her ovaries.    Continues to have sensation of shingles in her left eye, occasionally gets \"bumps\" above her left eye.    Juana Farrell also presents with:    Patient Active Problem List   Diagnosis     Chronic tension headaches     Generalized anxiety disorder     Nicotine addiction     Mixed hyperlipidemia     Benign essential hypertension     Current Code Status:  No Order    REVIEW OF SYSTEMS:    Review of Systems   Constitutional: Positive for fatigue.        Lost her  to rectal cancer this past June.   HENT:        Is overdue, is on her list to complete   Eyes:        Last eye exam was January 2020, is overdue. She has it on her list to schedule.   Gastrointestinal: Positive for abdominal pain.   Musculoskeletal: Positive for arthralgias (feet, knees and " "hands arthritic pain).   Psychiatric/Behavioral: Positive for sleep disturbance.        Increased stress with selling house, moving, getting rid of 's stuff   All other systems reviewed and are negative.    Problem List/PMH: Reviewed in EMR, and made relevant updates today.  Medications: Reviewed in EMR, and made relevant updates today.  Allergies: Reviewed in EMR, and made relevant updates today.    OBJECTIVE:                                                      BP (!) 142/94 (BP Location: Right arm, Patient Position: Sitting, Cuff Size: Adult Regular)   Pulse 86   Temp 97.8  F (36.6  C) (Tympanic)   Resp 18   Ht 1.588 m (5' 2.5\")   Wt 73.8 kg (162 lb 12.8 oz)   LMP  (LMP Unknown)   SpO2 97%   Breastfeeding No   BMI 29.30 kg/m      Current Pain Score:   Mild Pain (3)     Physical Exam  Vitals and nursing note reviewed.   Constitutional:       Appearance: Normal appearance.   HENT:      Head: Normocephalic and atraumatic.   Eyes:      Conjunctiva/sclera: Conjunctivae normal.   Cardiovascular:      Rate and Rhythm: Normal rate and regular rhythm.      Heart sounds: Normal heart sounds.   Pulmonary:      Effort: Pulmonary effort is normal.      Breath sounds: Normal breath sounds.   Abdominal:      General: Bowel sounds are normal.      Palpations: Abdomen is soft. There is no mass.      Tenderness: There is abdominal tenderness (RLQ).   Musculoskeletal:         General: Normal range of motion.   Skin:     General: Skin is warm and dry.   Neurological:      Mental Status: She is alert and oriented to person, place, and time. Mental status is at baseline.   Psychiatric:         Mood and Affect: Mood normal.         Behavior: Behavior normal.         Thought Content: Thought content normal.         Judgment: Judgment normal.          BP Readings from Last 3 Encounters:   01/14/21 (!) 146/98   03/25/20 130/76   01/17/20 (!) 140/84        Vitals:    08/09/21 0810   Weight: 73.8 kg (162 lb 12.8 oz)    "     The 10-year ASCVD risk score (Wheatlanddiane BRAN Jr., et al., 2013) is: 12.7%    Values used to calculate the score:      Age: 56 years      Sex: Female      Is Non- : No      Diabetic: No      Tobacco smoker: Yes      Systolic Blood Pressure: 142 mmHg      Is BP treated: Yes      HDL Cholesterol: 42 mg/dL      Total Cholesterol: 216 mg/dL    Diagnostics Completed at this Visit:    Results for orders placed or performed during the hospital encounter of 08/09/21   XR Abdomen 2 Views     Status: None    Narrative    PROCEDURE: XR ABDOMEN 2VIEWS 8/9/2021 9:27 AM    HISTORY: RLQ abdominal pain    COMPARISONS: None.    TECHNIQUE: Supine and upright views.    FINDINGS: There is no free intraperitoneal air. Small amount gas and  stool are seen within the colon to the level of the rectum. No dilated  gas-filled small bowel loops are seen. No mass is seen and there are  no suspicious calcifications.         Impression    IMPRESSION: No significant bowel distention.    HEATHER FORD MD         SYSTEM ID:  [id number]   Results for orders placed or performed in visit on 08/09/21   Hemoglobin A1c     Status: Normal   Result Value Ref Range    Hemoglobin A1C 5.5 4.0 - 6.2 %   Lipid Panel     Status: Abnormal   Result Value Ref Range    Cholesterol 233 (H) <200 mg/dL    Triglycerides 135 <150 mg/dL    Direct Measure HDL 40 23 - 92 mg/dL    LDL Cholesterol Calculated 166 (H) <=100 mg/dL    Non HDL Cholesterol 193 (H) <130 mg/dL    Patient Fasting > 8hrs? Unknown    CBC and Differential     Status: None    Narrative    The following orders were created for panel order CBC and Differential.  Procedure                               Abnormality         Status                     ---------                               -----------         ------                     CBC with platelets and d...[[id number]]                      Final result                 Please view results for these tests on the individual orders.    Comprehensive metabolic panel     Status: Normal   Result Value Ref Range    Sodium 136 134 - 144 mmol/L    Potassium 4.1 3.5 - 5.1 mmol/L    Chloride 102 98 - 107 mmol/L    Carbon Dioxide (CO2) 25 21 - 31 mmol/L    Anion Gap 9 3 - 14 mmol/L    Urea Nitrogen 18 7 - 25 mg/dL    Creatinine 0.87 0.60 - 1.20 mg/dL    Calcium 10.0 8.6 - 10.3 mg/dL    Glucose 91 70 - 105 mg/dL    Alkaline Phosphatase 96 34 - 104 U/L    AST 21 13 - 39 U/L    ALT 37 7 - 52 U/L    Protein Total 7.9 6.4 - 8.9 g/dL    Albumin 4.3 3.5 - 5.7 g/dL    Bilirubin Total 0.6 0.3 - 1.0 mg/dL    GFR Estimate 74 >60 mL/min/1.73m2   TSH Reflex GH     Status: Normal   Result Value Ref Range    TSH 2.02 0.40 - 4.00 mU/L   Hepatitis C antibody     Status: Normal   Result Value Ref Range    Hepatitis C Antibody Nonreactive Nonreactive    Narrative    Assay performance characteristics have not been established for newborns, infants, and children.   UA with Microscopic reflex to Culture     Status: Abnormal    Specimen: Urine, Midstream   Result Value Ref Range    Color Urine Light Yellow Colorless, Straw, Light Yellow, Yellow    Appearance Urine Clear Clear    Glucose Urine Negative Negative mg/dL    Bilirubin Urine Negative Negative    Ketones Urine Negative Negative mg/dL    Specific Gravity Urine 1.017 1.000 - 1.030    Blood Urine Trace (A) Negative    pH Urine 5.5 5.0 - 9.0    Protein Albumin Urine Negative Negative mg/dL    Urobilinogen Urine Normal Normal, 2.0 mg/dL    Nitrite Urine Negative Negative    Leukocyte Esterase Urine Negative Negative    Bacteria Urine Few (A) None Seen /HPF    Mucus Urine Present (A) None Seen /LPF    RBC Urine 1 <=2 /HPF    WBC Urine 1 <=5 /HPF    Narrative    Urine Culture not indicated   CBC with platelets and differential     Status: None   Result Value Ref Range    WBC Count 7.1 4.0 - 11.0 10e3/uL    RBC Count 5.09 3.80 - 5.20 10e6/uL    Hemoglobin 15.0 11.7 - 15.7 g/dL    Hematocrit 44.9 35.0 - 47.0 %    MCV 88 78 -  100 fL    MCH 29.5 26.5 - 33.0 pg    MCHC 33.4 31.5 - 36.5 g/dL    RDW 13.1 10.0 - 15.0 %    Platelet Count 348 150 - 450 10e3/uL    % Neutrophils 61 %    % Lymphocytes 27 %    % Monocytes 9 %    % Eosinophils 2 %    % Basophils 1 %    % Immature Granulocytes 0 %    NRBCs per 100 WBC 0 <1 /100    Absolute Neutrophils 4.4 1.6 - 8.3 10e3/uL    Absolute Lymphocytes 1.9 0.8 - 5.3 10e3/uL    Absolute Monocytes 0.6 0.0 - 1.3 10e3/uL    Absolute Eosinophils 0.1 0.0 - 0.7 10e3/uL    Absolute Basophils 0.1 0.0 - 0.2 10e3/uL    Absolute Immature Granulocytes 0.0 <=0.0 10e3/uL    Absolute NRBCs 0.0 10e3/uL        ASSESSMENT AND PLAN:    Generalized anxiety disorder  She has tapered herself back down to 60 mg daily.  She would like to stay at this dose for a while given the recent passing of her . Continue on Cymbalta, 60 mg daily.  - CBC and Differential, unremarkable  - DULoxetine (CYMBALTA) 60 MG capsule  Dispense: 90 capsule; Refill: 2    Essential hypertension  Blood pressure cuff given today.  Advised to check blood pressure daily, first thing in the morning after voiding.  Record and monitor. She is to watch for signs of hypertension.  She is to call if her blood pressure remains elevated.  She has not been taking her amlodipine.  She wishes to remain off of this medication if possible.  - lisinopril-hydrochlorothiazide (ZESTORETIC) 20-25 MG tablet  Dispense: 90 tablet; Refill: 2  - amLODIPine (NORVASC) 5 MG tablet  Dispense: 180 tablet; Refill: 2-currently not taking, historical update  - CBC and Differential, unremarkable  - Comprehensive metabolic panel, unremarkable    Screening for diabetes mellitus  - Hemoglobin A1c is at 5.5 today.  No indication of diabetes.  Rescreen annually.    Abnormal mammogram  Patient is due for annual mammogram.  She will reach out and schedule at her convenience.  - CBC and Differential, unremarkable    Screening for colon cancer  She is due for colonoscopy.  Referral placed for  procedure only.  - Adult Gastro Ref - Procedure Only    Chronic tension-type headache, not intractable  - CBC and Differential, unremarkable    Screening for hyperlipidemia  Mixed hyperlipidemia  - Lipid Panel, cholesterol elevated at 233, triglycerides are in normal range, HDL is in normal range, LDL is elevated at 166 non-HDL elevated at 193.  Instructed patient to work on diet improvement and increasing daily exercise.  This will also help with her mental health.    Screening for thyroid disorder  - TSH Reflex GH, in normal range today at 2.02.  Recommend annual screening    Asymptomatic postmenopausal status  She is never had a DEXA scan.  Due to asymptomatic post menopause and current cigarette use we will order today for baseline.  They will reach out and schedule appointment for her.  - DX Hip/Pelvis/Spine    Encounter for hepatitis C screening test for low risk patient  - Hepatitis C antibody, nonreactive.  No further screening necessary unless symptomatic    Tobacco use  Cigarette nicotine dependence with nicotine-induced disorder  - Greater than 3 minutes were spent on smoking cessation including encouragement to reduce cigarette use and discussion of modalities to assist with this.  Discussed starting maintenance inhaler.  She is interested in trying this.  - Cessation was encouraged in order to improve pain, reduce mortality, improve quality of life, and long term outcomes.  - NRT offered and patient declines at this time.  - fluticasone-salmeterol (ADVAIR DISKUS) 100-50 MCG/DOSE inhaler  Dispense: 1 each; Refill: 11    RLQ abdominal pain  Etiology unknown.  Differentials include UTI, ovarian cyst, appendicitis, Crohn's, IBS, carcinoma  - XR Abdomen 2 Views, FINDINGS: There is no free intraperitoneal air. Small amount gas and stool are seen within the colon to the level of the rectum. No dilated gas-filled small bowel loops are seen. No mass is seen and there are no suspicious calcifications. IMPRESSION:  "No significant bowel distention  - UA with Microscopic reflex to Culture indicates trace blood, few bacteria and mucus present.  Would recommend rescreen in 1 week.    Herpes zoster with ophthalmic complication, unspecified herpes zoster eye disease  Refill given today.  - valACYclovir (VALTREX) 1000 mg tablet  Dispense: 21 tablet; Refill: 3    Death of   Patient is dealing still, reports she has not officially \"grieved\" due to selling her house, being busy packing.    Other microscopic hematuria  Encourage fluid intake, rescreen in 1 week.     I explained my diagnostic considerations and recommendations to the patient, who voiced understanding and agreement with the treatment plan. All questions were answered. We discussed potential side effects of any prescribed or recommended therapies, as well as expectations for response to treatments.    Patient was advised to allow up to 2 days for response on lab results via MyChart and or letter to be sent.    FOLLOW-UP:    Return in about 1 year (around 8/9/2022) for Lab Work, Physical Exam.     Clinic : [phone]  Appointment line: [phone]     CYRUS Mccormick, AGNP-C  Internal Medicine  08/11/2021 9:06 AM  _____________________________________________________________________________________    Total time spent with this patient was 41 minutes which included chart review, visualization and interpretation of labs and/or images, time spent with patient, and documentation.    PATIENT INSTRUCTIONS:    Patient Instructions   Get Shingrix vaccine.  Get Pneumonia vaccine.    Follow up in one year for physical/labs.    Work on smoking cessation.    Complete advanced directives and return paperwork to clinic.     "

## 2021-08-09 NOTE — LETTER
" My COPD Action Plan     Name: Juana Farrell    YOB: 1965   Date: 8/8/2021    My doctor: Diane Wayne NP   My clinic: [facility]    [address]  [place] [address]  [phone]  My Controller Medicine: { :745354}   Dose: ***     My Rescue Medicine: { :912445}   Dose: ***     My Flare Up Medicine: { :670160}   Dose: ***     My COPD Severity: { :084974}      Use of Oxygen: { :189510::\"Oxygen Not Prescribed \"}     Make sure you've had your pneumonia   vaccines.          GREEN ZONE       Doing well today      Usual level of activity and exercise    Usual amount of cough and mucus    No shortness of breath    Usual level of health (thinking clearly, sleeping well, feel like eating) Actions:      Take daily medicines    Use oxygen as prescribed    Follow regular exercise and diet plan    Avoid cigarette smoke and other irritants that harm the lungs           YELLOW ZONE          Having a bad day or flare up      Short of breath more than usual    A lot more sputum (mucus) than usual    Sputum looks yellow, green, tan, brown or bloody    More coughing or wheezing    Fever or chills    Less energy; trouble completing activities    Trouble thinking or focusing    Using quick relief inhaler or nebulizer more often    Poor sleep; symptoms wake me up    Do not feel like eating Actions:      Get plenty of rest    Take daily medicines    Use quick relief inhaler every *** hours    If you use oxygen, call you doctor to see if you should adjust your oxygen    Do breathing exercises or other things to help you relax    Let a loved one, friend or neighbor know you are feeling worse    Call your care team if you have 2 or more symptoms.  Start taking steroids or antibiotics if directed by your care team           RED ZONE       Need medical care now      Severe shortness of breath (feel you can't breathe)    Fever, chills    Not enough breath to do any " activity    Trouble coughing up mucus, walking or talking    Blood in mucus    Frequent coughing   Rescue medicines are not working    Not able to sleep because of breathing    Feel confused or drowsy    Chest pain    Actions:      Call your health care team.  If you cannot reach your care team, call 911 or go to the emergency room.        Annual Reminders:  Meet with Care Team, Flu Shot every Fall  Pharmacy: [id number] - [address]

## 2021-08-09 NOTE — PATIENT INSTRUCTIONS
Get Shingrix vaccine.  Get Pneumonia vaccine.    Follow up in one year for physical/labs.    Work on smoking cessation.    Complete advanced directives and return paperwork to clinic.

## 2021-08-10 LAB — HCV AB SERPL QL IA: NONREACTIVE

## 2021-08-10 ASSESSMENT — PATIENT HEALTH QUESTIONNAIRE - PHQ9: SUM OF ALL RESPONSES TO PHQ QUESTIONS 1-9: 4

## 2021-09-18 ENCOUNTER — HEALTH MAINTENANCE LETTER (OUTPATIENT)
Age: 56
End: 2021-09-18

## 2021-10-28 ENCOUNTER — MYC MEDICAL ADVICE (OUTPATIENT)
Dept: INTERNAL MEDICINE | Facility: OTHER | Age: 56
End: 2021-10-28

## 2021-11-01 ENCOUNTER — E-VISIT (OUTPATIENT)
Dept: URGENT CARE | Facility: URGENT CARE | Age: 56
End: 2021-11-01
Payer: COMMERCIAL

## 2021-11-01 DIAGNOSIS — J01.90 ACUTE BACTERIAL SINUSITIS: Primary | ICD-10-CM

## 2021-11-01 DIAGNOSIS — B96.89 ACUTE BACTERIAL SINUSITIS: Primary | ICD-10-CM

## 2021-11-01 PROCEDURE — 99421 OL DIG E/M SVC 5-10 MIN: CPT | Performed by: FAMILY MEDICINE

## 2021-11-01 NOTE — PATIENT INSTRUCTIONS
Dear Juana Farrell    After reviewing your responses, I've been able to diagnose you with?a sinus infection caused by bacteria.?     Based on your responses and diagnosis, I have prescribed Augmentin to treat your symptoms. I have sent this to your pharmacy.?     It is also important to stay well hydrated, get lots of rest and take over-the-counter decongestants,?tylenol?or ibuprofen if you?are able to?take those medications per your primary care provider to help relieve discomfort.?     It is important that you take?all of?your prescribed medication even if your symptoms are improving after a few doses.? Taking?all of?your medicine helps prevent the symptoms from returning.?     If your symptoms worsen, you develop severe headache, vomiting, high fever (>102), or are not improving in 7 days, please contact your primary care provider for an appointment or visit any of our convenient Walk-in Care or Urgent Care Centers to be seen which can be found on our website?here.?     Thanks again for choosing?us?as your health care partner,?   ?  Melecio Shore, DO?

## 2021-11-16 NOTE — TELEPHONE ENCOUNTER
Does SK/ have a time she could see patient? See my chart message was sent to her on 11/4/21.  Rosa Stapleton LPN .............11/16/2021     3:27 PM

## 2021-11-18 NOTE — TELEPHONE ENCOUNTER
I can see patient on Monday, November 22 at 4.  Otherwise she can see any available provider sooner

## 2021-11-26 ENCOUNTER — OFFICE VISIT (OUTPATIENT)
Dept: INTERNAL MEDICINE | Facility: OTHER | Age: 56
End: 2021-11-26
Attending: INTERNAL MEDICINE
Payer: COMMERCIAL

## 2021-11-26 VITALS
WEIGHT: 161 LBS | SYSTOLIC BLOOD PRESSURE: 144 MMHG | RESPIRATION RATE: 16 BRPM | TEMPERATURE: 97.1 F | BODY MASS INDEX: 28.98 KG/M2 | DIASTOLIC BLOOD PRESSURE: 100 MMHG | HEART RATE: 84 BPM | OXYGEN SATURATION: 96 %

## 2021-11-26 DIAGNOSIS — G47.00 INSOMNIA, UNSPECIFIED TYPE: ICD-10-CM

## 2021-11-26 DIAGNOSIS — F41.1 GENERALIZED ANXIETY DISORDER: Primary | ICD-10-CM

## 2021-11-26 PROCEDURE — 99214 OFFICE O/P EST MOD 30 MIN: CPT | Performed by: INTERNAL MEDICINE

## 2021-11-26 RX ORDER — SERTRALINE HYDROCHLORIDE 25 MG/1
25 TABLET, FILM COATED ORAL DAILY
Qty: 90 TABLET | Refills: 0 | Status: SHIPPED | OUTPATIENT
Start: 2021-11-26 | End: 2022-09-19

## 2021-11-26 RX ORDER — DULOXETIN HYDROCHLORIDE 30 MG/1
30 CAPSULE, DELAYED RELEASE ORAL DAILY
Qty: 14 CAPSULE | Refills: 0 | Status: SHIPPED | OUTPATIENT
Start: 2021-11-26 | End: 2022-09-19

## 2021-11-26 RX ORDER — TRAZODONE HYDROCHLORIDE 50 MG/1
25-50 TABLET, FILM COATED ORAL AT BEDTIME
Qty: 90 TABLET | Refills: 0 | Status: SHIPPED | OUTPATIENT
Start: 2021-11-26 | End: 2022-09-19

## 2021-11-26 ASSESSMENT — ANXIETY QUESTIONNAIRES
GAD7 TOTAL SCORE: 13
7. FEELING AFRAID AS IF SOMETHING AWFUL MIGHT HAPPEN: NOT AT ALL
1. FEELING NERVOUS, ANXIOUS, OR ON EDGE: MORE THAN HALF THE DAYS
4. TROUBLE RELAXING: NEARLY EVERY DAY
3. WORRYING TOO MUCH ABOUT DIFFERENT THINGS: MORE THAN HALF THE DAYS
6. BECOMING EASILY ANNOYED OR IRRITABLE: MORE THAN HALF THE DAYS
GAD7 TOTAL SCORE: 13
7. FEELING AFRAID AS IF SOMETHING AWFUL MIGHT HAPPEN: NOT AT ALL
8. IF YOU CHECKED OFF ANY PROBLEMS, HOW DIFFICULT HAVE THESE MADE IT FOR YOU TO DO YOUR WORK, TAKE CARE OF THINGS AT HOME, OR GET ALONG WITH OTHER PEOPLE?: SOMEWHAT DIFFICULT
2. NOT BEING ABLE TO STOP OR CONTROL WORRYING: MORE THAN HALF THE DAYS
GAD7 TOTAL SCORE: 13
5. BEING SO RESTLESS THAT IT IS HARD TO SIT STILL: MORE THAN HALF THE DAYS

## 2021-11-26 ASSESSMENT — PATIENT HEALTH QUESTIONNAIRE - PHQ9
SUM OF ALL RESPONSES TO PHQ QUESTIONS 1-9: 8
SUM OF ALL RESPONSES TO PHQ QUESTIONS 1-9: 8
10. IF YOU CHECKED OFF ANY PROBLEMS, HOW DIFFICULT HAVE THESE PROBLEMS MADE IT FOR YOU TO DO YOUR WORK, TAKE CARE OF THINGS AT HOME, OR GET ALONG WITH OTHER PEOPLE: SOMEWHAT DIFFICULT

## 2021-11-26 ASSESSMENT — PAIN SCALES - GENERAL: PAINLEVEL: NO PAIN (0)

## 2021-11-26 NOTE — PATIENT INSTRUCTIONS
Decrease Cymablta to 30mg daily for 2 weeks and then stop    Start Sertraline (Zoloft) at 25mg daily for 2 weeks and if tolerated increase to 50mg daily and let me know    Stop the Benadryl    Consider Melatonin at bedtime (3-5mg nightly); take 1-2 hours before you want to sleep    If the Melatonin does not work, ok to start Trazodone 25-50mg nightly as needed for sleep    Increase Amlodipine to twice daily and send me some readings in about 3-4 weeks.

## 2021-11-26 NOTE — NURSING NOTE
Patient presents to clinic today for follow up on anxiety and depression medications.  Medication reconciliation completed.    ACP on file? No, form previously provided.     FOOD SECURITY SCREENING QUESTIONS  Hunger Vital Signs:  Within the past 12 months we worried whether our food would run out before we got money to buy more. Never  Within the past 12 months the food we bought just didn't last and we didn't have money to get more. Never    Cindy Luz CMA([facility])..................11/26/2021   10:43 AM

## 2021-11-26 NOTE — PROGRESS NOTES
"Chief Complaint   Patient presents with     RECHECK         HPI: Ms. Everton Farrell is a 56 year old female who presents today for follow up of anxiety and sleep issues.  Her  passed away this past year.    She has not been using the Lorazepam rarely.  She is not sleeping well but has found some relief with Benadryl every other night.  She has not been able to fall asleep.  She is having issues getting up in the morning.  This has been over the last 6 months.  She is not sure if her Cymbalta is working.  She has been on Paxil and Zoloft in the past.  She feels the Zoloft stopped working and the Paxil caused some side effects.    She had swelling of her lips within with in 1 week of completing Amoxicillin recently.  She has never had anything like this prior.    She  reports that she has been smoking cigarettes. She has a 15.00 pack-year smoking history. She has never used smokeless tobacco.    Past medical history reviewed as below:     Past Medical History:   Diagnosis Date     Acquired hallux valgus of left foot     Bilateral hallux valgus     Anxiety and depression 1995     Benign essential hypertension 12/3/2019     Chronic tension headaches 10/3/2012     Dermatitis     Dermatitis of the ear canals     Mixed hyperlipidemia 10/30/2018     Nicotine addiction 1/18/2018     Otitis externa    .      ROS  Pertinent ROS was performed and was negative, including for fever, chills. No other concerns, with exception of HPI above.      EXAM:   BP (!) 144/100 (BP Location: Right arm, Patient Position: Sitting, Cuff Size: Adult Regular)   Pulse 84   Temp 97.1  F (36.2  C) (Tympanic)   Resp 16   Wt 73 kg (161 lb)   LMP  (LMP Unknown)   SpO2 96%   BMI 28.98 kg/m      Estimated body mass index is 28.98 kg/m  as calculated from the following:    Height as of 8/9/21: 1.588 m (5' 2.5\").    Weight as of this encounter: 73 kg (161 lb).      GEN: Vitals reviewed. Healthy appearing. Patient is in no acute distress. " Cooperative with exam.  HEENT: Normocephalic atraumatic.  Eyes grossly normal to inspection.  No discharge or erythema, or obvious scleral/conjunctival abnormalities.   LUNGS: No audible wheeze, cough, or visible cyanosis.  No visible retractions or increased work of breathing.   SKIN: Warm and dry to touch.  Visible skin clear. No significant rash, abnormal pigmentation or lesions.  EXT: No clubbing or cyanosis.  No peripheral edema.  PSYCH: Mood is good.  Mentation appears normal, affect normal/bright, judgement and insight intact, normal speech and appearance well-groomed.     ASSESSMENT AND PLAN:    Generalized anxiety disorder  - At this time we will taper off of Cymbalta and start her o Zoloft.  She can increase to 50 mg daily if needed.  She is encouraged to touch base over the next 4 to 6 weeks.  - sertraline (ZOLOFT) 25 MG tablet  Dispense: 90 tablet; Refill: 0  - DULoxetine (CYMBALTA) 30 MG capsule  Dispense: 14 capsule; Refill: 0    Insomnia, unspecified type  Recommend stopping Benadryl and minimizing lorazepam use.  We will try low-dose trazodone.  She is to call with any side effects.  - traZODone (DESYREL) 50 MG tablet  Dispense: 90 tablet; Refill: 0       Return in about 5 months (around 4/26/2022) for Annual Review with renewal of all medications.    30 minutes spent on the date of the encounter doing chart review, history and exam, documentation and further activities as noted above      PAOLA BERG, DO   11/26/2021 10:57 AM      Answers for HPI/ROS submitted by the patient on 11/26/2021  If you checked off any problems, how difficult have these problems made it for you to do your work, take care of things at home, or get along with other people?: Somewhat difficult  PHQ9 TOTAL SCORE: 8  CARMINA 7 TOTAL SCORE: 13

## 2021-11-27 ASSESSMENT — ANXIETY QUESTIONNAIRES: GAD7 TOTAL SCORE: 13

## 2021-11-27 ASSESSMENT — PATIENT HEALTH QUESTIONNAIRE - PHQ9: SUM OF ALL RESPONSES TO PHQ QUESTIONS 1-9: 8

## 2022-02-10 ENCOUNTER — E-VISIT (OUTPATIENT)
Dept: URGENT CARE | Facility: URGENT CARE | Age: 57
End: 2022-02-10
Payer: COMMERCIAL

## 2022-02-10 DIAGNOSIS — J01.90 ACUTE SINUSITIS WITH SYMPTOMS > 10 DAYS: Primary | ICD-10-CM

## 2022-02-10 DIAGNOSIS — I10 ESSENTIAL HYPERTENSION: ICD-10-CM

## 2022-02-10 PROCEDURE — 99421 OL DIG E/M SVC 5-10 MIN: CPT | Performed by: PHYSICIAN ASSISTANT

## 2022-02-10 RX ORDER — DOXYCYCLINE HYCLATE 100 MG
100 TABLET ORAL 2 TIMES DAILY
Qty: 14 TABLET | Refills: 0 | Status: SHIPPED | OUTPATIENT
Start: 2022-02-10 | End: 2022-02-17

## 2022-02-10 NOTE — TELEPHONE ENCOUNTER
Routing refill request to provider for review/approval because:  Medication is reported/historical  Blood pressure under 140/90 in past 12 months        BP Readings from Last 3 Encounters:   11/26/21 (!) 144/100   08/09/21 (!) 142/100   01/14/21 (!) 146/98      LOV: 11/26/2021    Christy Sprague RN on 2/10/2022 at 11:21 AM

## 2022-02-10 NOTE — PATIENT INSTRUCTIONS
You may want to try a nasal lavage (also known as nasal irrigation). You can find over-the-counter products, such as Neti-Pot, at retail locations or make your own at home. Instructions for homemade nasal lavage and more information on the process are available online at http://www.aafp.org/afp/2009/1115/p1121.html.    Dear Juana Farrell    After reviewing your responses, I've been able to diagnose you with?a sinus infection caused by bacteria.?     Based on your responses and diagnosis, I have prescribed Doxycycline to treat your symptoms. I have sent this to your pharmacy.?     It is also important to stay well hydrated, get lots of rest and take over-the-counter decongestants,?tylenol?or ibuprofen if you?are able to?take those medications per your primary care provider to help relieve discomfort.?     It is important that you take?all of?your prescribed medication even if your symptoms are improving after a few doses.? Taking?all of?your medicine helps prevent the symptoms from returning.?     If your symptoms worsen, you develop severe headache, vomiting, high fever (>102), or are not improving in 7 days, please contact your primary care provider for an appointment or visit any of our convenient Walk-in Care or Urgent Care Centers to be seen which can be found on our website?here.?     Thanks again for choosing?us?as your health care partner,?   ?  Fabiola Loza PA-C?

## 2022-02-11 RX ORDER — AMLODIPINE BESYLATE 5 MG/1
TABLET ORAL
Qty: 180 TABLET | Refills: 2 | Status: SHIPPED | OUTPATIENT
Start: 2022-02-11 | End: 2022-09-19

## 2022-04-24 ENCOUNTER — HEALTH MAINTENANCE LETTER (OUTPATIENT)
Age: 57
End: 2022-04-24

## 2022-05-09 DIAGNOSIS — I10 ESSENTIAL HYPERTENSION: ICD-10-CM

## 2022-05-11 RX ORDER — LISINOPRIL AND HYDROCHLOROTHIAZIDE 20; 25 MG/1; MG/1
1 TABLET ORAL DAILY
Qty: 90 TABLET | Refills: 2 | Status: SHIPPED | OUTPATIENT
Start: 2022-05-11 | End: 2022-09-19

## 2022-05-11 NOTE — TELEPHONE ENCOUNTER
[facility] sent Rx request for the following:      LISINOPRIL-HCTZ 20/25MG TABLETS      Last Prescription Date:   8/9/2021  Last Fill Qty/Refills:         90, R-2    Last Office Visit:              11/26/2021   Future Office visit:           8/12/2022    Omero Vazquez RN, BSN  ....................  5/11/2022   4:09 PM

## 2022-08-09 DIAGNOSIS — Z72.0 TOBACCO USE: ICD-10-CM

## 2022-08-10 NOTE — TELEPHONE ENCOUNTER
[facility] sent Rx request for the following:      Requested Prescriptions   Pending Prescriptions Disp Refills     ADVAIR DISKUS 100-50 MCG/ACT inhaler [Pharmacy Med Name: ADVAIR DISKUS 100/50MCG (GREEN)60S]       Sig: INHALE 1 PUFF INTO THE LUNGS TWICE DAILY       Long-Acting Beta Agonist Inhalers Protocol  Failed - 8/10/2022 11:22 AM        Failed - Order for Serevent, Striverdi, or Foradil and pt has steroid inhaler     Last Prescription Date:   8/8/21  Last Fill Qty/Refills:         1, R-11    Last Office Visit:              11/26/21   Future Office visit:             Next 5 appointments (look out 90 days)    Aug 12, 2022 11:20 AM  PHYSICAL with Diane Wayne NP  [facility] ([facility] ) [address]  [place] [address]  [phone]        Suzie Antoine RN .............. 8/10/2022  11:29 AM

## 2022-08-12 RX ORDER — CEPHALEXIN 250 MG/1
CAPSULE ORAL
Qty: 180 EACH | Refills: 1 | Status: SHIPPED | OUTPATIENT
Start: 2022-08-12 | End: 2022-09-19

## 2022-09-19 ENCOUNTER — OFFICE VISIT (OUTPATIENT)
Dept: INTERNAL MEDICINE | Facility: OTHER | Age: 57
End: 2022-09-19
Attending: NURSE PRACTITIONER
Payer: COMMERCIAL

## 2022-09-19 VITALS
WEIGHT: 151 LBS | BODY MASS INDEX: 26.75 KG/M2 | TEMPERATURE: 97.8 F | DIASTOLIC BLOOD PRESSURE: 80 MMHG | SYSTOLIC BLOOD PRESSURE: 122 MMHG | RESPIRATION RATE: 14 BRPM | OXYGEN SATURATION: 97 % | HEART RATE: 91 BPM | HEIGHT: 63 IN

## 2022-09-19 DIAGNOSIS — I10 ESSENTIAL HYPERTENSION: ICD-10-CM

## 2022-09-19 DIAGNOSIS — Z12.31 ENCOUNTER FOR SCREENING MAMMOGRAM FOR BREAST CANCER: ICD-10-CM

## 2022-09-19 DIAGNOSIS — Z13.29 SCREENING FOR THYROID DISORDER: ICD-10-CM

## 2022-09-19 DIAGNOSIS — Z12.2 SCREENING FOR LUNG CANCER: ICD-10-CM

## 2022-09-19 DIAGNOSIS — Z13.1 SCREENING FOR DIABETES MELLITUS: ICD-10-CM

## 2022-09-19 DIAGNOSIS — Z72.0 TOBACCO USE: ICD-10-CM

## 2022-09-19 DIAGNOSIS — F41.1 GENERALIZED ANXIETY DISORDER: ICD-10-CM

## 2022-09-19 DIAGNOSIS — Z12.11 SCREENING FOR COLON CANCER: ICD-10-CM

## 2022-09-19 DIAGNOSIS — R31.29 OTHER MICROSCOPIC HEMATURIA: Primary | ICD-10-CM

## 2022-09-19 DIAGNOSIS — E78.2 MIXED HYPERLIPIDEMIA: ICD-10-CM

## 2022-09-19 DIAGNOSIS — G47.00 INSOMNIA, UNSPECIFIED TYPE: ICD-10-CM

## 2022-09-19 LAB
ALBUMIN SERPL-MCNC: 4.5 G/DL (ref 3.5–5.7)
ALP SERPL-CCNC: 68 U/L (ref 34–104)
ALT SERPL W P-5'-P-CCNC: 14 U/L (ref 7–52)
ANION GAP SERPL CALCULATED.3IONS-SCNC: 7 MMOL/L (ref 3–14)
AST SERPL W P-5'-P-CCNC: 13 U/L (ref 13–39)
BASOPHILS # BLD AUTO: 0.1 10E3/UL (ref 0–0.2)
BASOPHILS NFR BLD AUTO: 1 %
BILIRUB SERPL-MCNC: 0.5 MG/DL (ref 0.3–1)
BUN SERPL-MCNC: 13 MG/DL (ref 7–25)
CALCIUM SERPL-MCNC: 9.9 MG/DL (ref 8.6–10.3)
CHLORIDE BLD-SCNC: 102 MMOL/L (ref 98–107)
CHOLEST SERPL-MCNC: 200 MG/DL
CO2 SERPL-SCNC: 29 MMOL/L (ref 21–31)
CREAT SERPL-MCNC: 0.82 MG/DL (ref 0.6–1.2)
CREAT UR-MCNC: 71.8 MG/DL
EOSINOPHIL # BLD AUTO: 0.2 10E3/UL (ref 0–0.7)
EOSINOPHIL NFR BLD AUTO: 2 %
ERYTHROCYTE [DISTWIDTH] IN BLOOD BY AUTOMATED COUNT: 13.4 % (ref 10–15)
FASTING STATUS PATIENT QL REPORTED: YES
GFR SERPL CREATININE-BSD FRML MDRD: 82 ML/MIN/1.73M2
GLUCOSE BLD-MCNC: 86 MG/DL (ref 70–105)
HBA1C MFR BLD: 5.5 % (ref 4–6.2)
HCT VFR BLD AUTO: 44.2 % (ref 35–47)
HDLC SERPL-MCNC: 46 MG/DL (ref 23–92)
HGB BLD-MCNC: 15 G/DL (ref 11.7–15.7)
IMM GRANULOCYTES # BLD: 0 10E3/UL
IMM GRANULOCYTES NFR BLD: 0 %
LDLC SERPL CALC-MCNC: 128 MG/DL
LYMPHOCYTES # BLD AUTO: 1.8 10E3/UL (ref 0.8–5.3)
LYMPHOCYTES NFR BLD AUTO: 22 %
MCH RBC QN AUTO: 29.9 PG (ref 26.5–33)
MCHC RBC AUTO-ENTMCNC: 33.9 G/DL (ref 31.5–36.5)
MCV RBC AUTO: 88 FL (ref 78–100)
MICROALBUMIN UR-MCNC: <12 MG/L
MICROALBUMIN/CREAT UR: NORMAL MG/G{CREAT}
MONOCYTES # BLD AUTO: 0.7 10E3/UL (ref 0–1.3)
MONOCYTES NFR BLD AUTO: 9 %
NEUTROPHILS # BLD AUTO: 5.3 10E3/UL (ref 1.6–8.3)
NEUTROPHILS NFR BLD AUTO: 66 %
NONHDLC SERPL-MCNC: 154 MG/DL
NRBC # BLD AUTO: 0 10E3/UL
NRBC BLD AUTO-RTO: 0 /100
PLATELET # BLD AUTO: 384 10E3/UL (ref 150–450)
POTASSIUM BLD-SCNC: 4.4 MMOL/L (ref 3.5–5.1)
PROT SERPL-MCNC: 7.6 G/DL (ref 6.4–8.9)
RBC # BLD AUTO: 5.01 10E6/UL (ref 3.8–5.2)
SODIUM SERPL-SCNC: 138 MMOL/L (ref 134–144)
TRIGL SERPL-MCNC: 130 MG/DL
TSH SERPL DL<=0.005 MIU/L-ACNC: 1.67 MU/L (ref 0.4–4)
WBC # BLD AUTO: 8 10E3/UL (ref 4–11)

## 2022-09-19 PROCEDURE — 84443 ASSAY THYROID STIM HORMONE: CPT | Mod: ZL | Performed by: NURSE PRACTITIONER

## 2022-09-19 PROCEDURE — 99215 OFFICE O/P EST HI 40 MIN: CPT | Performed by: NURSE PRACTITIONER

## 2022-09-19 PROCEDURE — 83036 HEMOGLOBIN GLYCOSYLATED A1C: CPT | Mod: ZL | Performed by: NURSE PRACTITIONER

## 2022-09-19 PROCEDURE — 82043 UR ALBUMIN QUANTITATIVE: CPT | Mod: ZL | Performed by: NURSE PRACTITIONER

## 2022-09-19 PROCEDURE — 80061 LIPID PANEL: CPT | Mod: ZL | Performed by: NURSE PRACTITIONER

## 2022-09-19 PROCEDURE — 85025 COMPLETE CBC W/AUTO DIFF WBC: CPT | Mod: ZL | Performed by: NURSE PRACTITIONER

## 2022-09-19 PROCEDURE — 36415 COLL VENOUS BLD VENIPUNCTURE: CPT | Mod: ZL | Performed by: NURSE PRACTITIONER

## 2022-09-19 PROCEDURE — 80053 COMPREHEN METABOLIC PANEL: CPT | Mod: ZL | Performed by: NURSE PRACTITIONER

## 2022-09-19 RX ORDER — DULOXETIN HYDROCHLORIDE 60 MG/1
60 CAPSULE, DELAYED RELEASE ORAL DAILY
Qty: 90 CAPSULE | Refills: 4 | Status: SHIPPED | OUTPATIENT
Start: 2022-09-19 | End: 2023-10-02

## 2022-09-19 RX ORDER — FLUTICASONE PROPIONATE AND SALMETEROL 100; 50 UG/1; UG/1
POWDER RESPIRATORY (INHALATION)
Qty: 180 EACH | Refills: 4 | Status: SHIPPED | OUTPATIENT
Start: 2022-09-19 | End: 2023-10-02

## 2022-09-19 RX ORDER — AMLODIPINE BESYLATE 5 MG/1
TABLET ORAL
Qty: 180 TABLET | Refills: 4 | Status: SHIPPED | OUTPATIENT
Start: 2022-09-19 | End: 2023-01-31 | Stop reason: ALTCHOICE

## 2022-09-19 RX ORDER — TRAZODONE HYDROCHLORIDE 50 MG/1
25-50 TABLET, FILM COATED ORAL AT BEDTIME
Qty: 90 TABLET | Refills: 0 | COMMUNITY
Start: 2022-09-19 | End: 2023-10-02

## 2022-09-19 RX ORDER — LISINOPRIL AND HYDROCHLOROTHIAZIDE 20; 25 MG/1; MG/1
1 TABLET ORAL DAILY
Qty: 90 TABLET | Refills: 4 | Status: SHIPPED | OUTPATIENT
Start: 2022-09-19 | End: 2023-01-31

## 2022-09-19 ASSESSMENT — ENCOUNTER SYMPTOMS
CHILLS: 0
COUGH: 0
ARTHRALGIAS: 0
HEADACHES: 0
WEAKNESS: 0
HEMATOCHEZIA: 0
CONSTIPATION: 0
SHORTNESS OF BREATH: 0
DIARRHEA: 0
HEMATURIA: 0
MYALGIAS: 0
SORE THROAT: 0
FEVER: 0
EYE PAIN: 0
ABDOMINAL PAIN: 0
HEARTBURN: 0
FREQUENCY: 0
JOINT SWELLING: 0
DYSURIA: 0
BREAST MASS: 0
DIZZINESS: 0
PARESTHESIAS: 0
PALPITATIONS: 0
NERVOUS/ANXIOUS: 0
NAUSEA: 0

## 2022-09-19 ASSESSMENT — PATIENT HEALTH QUESTIONNAIRE - PHQ9: SUM OF ALL RESPONSES TO PHQ QUESTIONS 1-9: 0

## 2022-09-19 ASSESSMENT — PAIN SCALES - GENERAL: PAINLEVEL: NO PAIN (0)

## 2022-09-19 NOTE — NURSING NOTE
"Chief Complaint   Patient presents with     Physical     Annual well visit       Initial /80 (BP Location: Right arm, Patient Position: Sitting, Cuff Size: Adult Regular)   Pulse 91   Temp 97.8  F (36.6  C) (Temporal)   Resp 14   Ht 1.588 m (5' 2.5\")   Wt 68.5 kg (151 lb)   LMP  (LMP Unknown)   SpO2 97%   Breastfeeding No   BMI 27.18 kg/m   Estimated body mass index is 27.18 kg/m  as calculated from the following:    Height as of this encounter: 1.588 m (5' 2.5\").    Weight as of this encounter: 68.5 kg (151 lb).     Medication Reconciliation: complete      FOOD SECURITY SCREENING QUESTIONS:    The next two questions are to help us understand your food security.  If you are feeling you need any assistance in this area, we have resources available to support you today.    Hunger Vital Signs:  Within the past 12 months we worried whether our food would run out before we got money to buy more. Never  Within the past 12 months the food we bought just didn't last and we didn't have money to get more. Never      Advance care plan reviewed      Lexus Schofield LPN on 9/19/2022 at 8:28 AM      "

## 2022-09-19 NOTE — PATIENT INSTRUCTIONS
Make appointment with Vaccination clinic for Shingrix, pneumonia and COVID booster when you are ready.

## 2022-09-19 NOTE — PROGRESS NOTES
SUBJECTIVE:   CC: Juana is an 57 year old who presents for preventive health visit, recommended age appropriate annual screenings, and refills of chronic medications.     Patient has been advised of split billing requirements and indicates understanding: Yes     Healthy Habits:     Getting at least 3 servings of Calcium per day:  Yes    Bi-annual eye exam:  Yes    Dental care twice a year:  Yes    Sleep apnea or symptoms of sleep apnea:  None    Diet:  Low salt    Frequency of exercise:  4-5 days/week    Duration of exercise:  15-30 minutes    Taking medications regularly:  Yes    Medication side effects:  None    PHQ-2 Total Score: 0    Additional concerns today:  No    Ability to successfully perform activities of daily living: Yes, no assistance needed  Home safety:  none identified     Reports she is exercising every morning    Has establish care with a dermatologist in [place]    Has her routine eye exam in February.  She goes annually.    Review current opioid prescriptions    For a patient with a current opioid prescription:    Reviewed potential Opioid Use Disorder (OUD) risk factors: Yes     Evaluate their pain severity and current treatment plan:     Provide information on non-opioid treatment options:    Refer to a specialist, as appropriate:    Get more information on pain management in the HHS Pain Management Best Practices Inter-agency Task Force Report    Screen for potential Substance Use Disorders (SUDs)    Reviewed the patient s potential risk factors for SUDs: Yes     Refer to treatment or specialist, as appropriate:     A screening tool isn t required but you may use one:  Find more information in the National [place] on Drug Abuse Screening and Assessment Tools Chart    Today's PHQ-2 Score:   PHQ-2 ( 1999 Pfizer) 9/19/2022   Q1: Little interest or pleasure in doing things 0   Q2: Feeling down, depressed or hopeless 0   PHQ-2 Score 0   PHQ-2 Total Score (12-17 Years)- Positive if 3 or more  points; Administer PHQ-A if positive -   Q1: Little interest or pleasure in doing things Not at all   Q2: Feeling down, depressed or hopeless Not at all   PHQ-2 Score 0     Abuse: Current or Past (Physical, Sexual or Emotional) - No  Do you feel safe in your environment? Yes     Have you ever done Advance Care Planning? (For example, a Health Directive, POLST, or a discussion with a medical provider or your loved ones about your wishes): Yes, patient states has an Advance Care Planning document and will bring a copy to the clinic.    Social History     Tobacco Use     Smoking status: Current Every Day Smoker     Packs/day: 1.00     Years: 19.00     Pack years: 19.00     Types: Cigarettes     Smokeless tobacco: Never Used     Tobacco comment: Started smoking at age 38.  Typically 1 pack a day but cutting back    Substance Use Topics     Alcohol use: No     Alcohol/week: 0.0 standard drinks     Comment: Alcoholic Drinks/day: rare     Alcohol Use 2022   Prescreen: >3 drinks/day or >7 drinks/week? No     Reviewed orders with patient.  Reviewed health maintenance and updated orders accordingly - Yes    Lab work is in process  BP Readings from Last 3 Encounters:   22 122/80   21 (!) 144/100   21 (!) 142/100    Wt Readings from Last 3 Encounters:   22 68.5 kg (151 lb)   21 73 kg (161 lb)   21 73.8 kg (162 lb 12.8 oz)          Patient Active Problem List   Diagnosis     Chronic tension headaches     Generalized anxiety disorder     Nicotine addiction     Mixed hyperlipidemia     Essential hypertension     Tobacco use     Anxiety     Screening for hyperlipidemia     Screening for colon cancer     Abnormal mammogram     Screening for diabetes mellitus     Screening for thyroid disorder     Asymptomatic postmenopausal status     Death of      Other microscopic hematuria     Past Surgical History:   Procedure Laterality Date      SECTION      2 C-Sections     COLONOSCOPY       within normal limits.     ECTOPIC PREGNANCY SURGERY      Two ectopic pregnancies; with one the left tube removed     HYSTERECTOMY TOTAL ABDOMINAL  2009    Total abdominal hysterectomy by Dr. 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  Typically 1 pack a day but cutting back    Substance Use Topics     Alcohol use: No     Alcohol/week: 0.0 standard drinks     Comment: Alcoholic Drinks/day: rare     Family History   Problem Relation Age of Onset     Cancer Father         Abernathy's polyposis syndrome with father dying early of colon cancer.   at age 41 of Abernathy's polyposis with resulting colon cancer     Hypertension Mother      Hypertension Brother      Colon Cancer Brother         Abernathy's     No Known Problems Sister      Colon Cancer Brother         Abernathy's     Other - See Comments Other FHx        Family history of Abernathy's polyposis syndrome, negative genetic testing     Diabetes Maternal Grandmother         Diabetes,FHMaternal diabetes in the family     No Known Problems Daughter      No Known Problems Daughter            Breast Cancer Screening:  Any new diagnosis of family breast, ovarian, or bowel cancer? No    FHS-7: No flowsheet data found.    Mammogram Screening: Recommended mammography every 1-2 years with patient discussion and risk factor consideration  Pertinent mammograms are reviewed under the imaging tab.    History of abnormal Pap smear: YES - has total hysterectomy     Review of Systems   Constitutional: Negative for chills and fever.   HENT: Negative for congestion,  "ear pain, hearing loss and sore throat.    Eyes: Negative for pain and visual disturbance.   Respiratory: Negative for cough and shortness of breath.    Cardiovascular: Negative for chest pain, palpitations and peripheral edema.   Gastrointestinal: Negative for abdominal pain, constipation, diarrhea, heartburn, hematochezia and nausea.   Breasts:  Negative for tenderness, breast mass and discharge.   Genitourinary: Negative for dysuria, frequency, genital sores, hematuria, pelvic pain, urgency, vaginal bleeding and vaginal discharge.   Musculoskeletal: Negative for arthralgias, joint swelling and myalgias.   Skin: Negative for rash.   Neurological: Negative for dizziness, weakness, headaches and paresthesias.   Psychiatric/Behavioral: Negative for mood changes. The patient is not nervous/anxious.    All other systems reviewed and are negative.    OBJECTIVE:   /80 (BP Location: Right arm, Patient Position: Sitting, Cuff Size: Adult Regular)   Pulse 91   Temp 97.8  F (36.6  C) (Temporal)   Resp 14   Ht 1.588 m (5' 2.5\")   Wt 68.5 kg (151 lb)   LMP  (LMP Unknown)   SpO2 97%   Breastfeeding No   BMI 27.18 kg/m    Physical Exam  Vitals and nursing note reviewed.   Constitutional:       Appearance: Normal appearance.   HENT:      Head: Normocephalic and atraumatic.   Eyes:      Extraocular Movements: Extraocular movements intact.      Conjunctiva/sclera: Conjunctivae normal.   Cardiovascular:      Rate and Rhythm: Normal rate and regular rhythm.      Heart sounds: Normal heart sounds.   Pulmonary:      Effort: Pulmonary effort is normal.      Breath sounds: Normal breath sounds.   Abdominal:      General: Bowel sounds are normal.      Palpations: Abdomen is soft.   Musculoskeletal:         General: Normal range of motion.      Cervical back: No tenderness.   Lymphadenopathy:      Cervical: No cervical adenopathy.   Skin:     General: Skin is warm and dry.   Neurological:      Mental Status: She is alert and " oriented to person, place, and time. Mental status is at baseline.   Psychiatric:         Mood and Affect: Mood normal.         Behavior: Behavior normal.         Thought Content: Thought content normal.         Judgment: Judgment normal.       Diagnostic Test Results:  Labs reviewed in Epic  Results for orders placed or performed in visit on 09/19/22   Albumin Random Urine Quantitative with Creat Ratio     Status: None   Result Value Ref Range    Albumin Urine mg/L <12.0 mg/L    Albumin Urine mg/g Cr      Creatinine Urine mg/dL 71.8 mg/dL   COMPREHENSIVE METABOLIC PANEL     Status: Normal   Result Value Ref Range    Sodium 138 134 - 144 mmol/L    Potassium 4.4 3.5 - 5.1 mmol/L    Chloride 102 98 - 107 mmol/L    Carbon Dioxide (CO2) 29 21 - 31 mmol/L    Anion Gap 7 3 - 14 mmol/L    Urea Nitrogen 13 7 - 25 mg/dL    Creatinine 0.82 0.60 - 1.20 mg/dL    Calcium 9.9 8.6 - 10.3 mg/dL    Glucose 86 70 - 105 mg/dL    Alkaline Phosphatase 68 34 - 104 U/L    AST 13 13 - 39 U/L    ALT 14 7 - 52 U/L    Protein Total 7.6 6.4 - 8.9 g/dL    Albumin 4.5 3.5 - 5.7 g/dL    Bilirubin Total 0.5 0.3 - 1.0 mg/dL    GFR Estimate 82 >60 mL/min/1.73m2   Hemoglobin A1c     Status: Normal   Result Value Ref Range    Hemoglobin A1C 5.5 4.0 - 6.2 %   Lipid Profile     Status: Abnormal   Result Value Ref Range    Cholesterol 200 (H) <200 mg/dL    Triglycerides 130 <150 mg/dL    Direct Measure HDL 46 23 - 92 mg/dL    LDL Cholesterol Calculated 128 (H) <=100 mg/dL    Non HDL Cholesterol 154 (H) <130 mg/dL    Patient Fasting > 8hrs? Yes     Narrative    Cholesterol  Desirable:  <200 mg/dL    Triglycerides  Normal:  Less than 150 mg/dL  Borderline High:  150-199 mg/dL  High:  200-499 mg/dL  Very High:  Greater than or equal to 500 mg/dL    Direct Measure HDL  Female:  Greater than or equal to 50 mg/dL   Male:  Greater than or equal to 40 mg/dL    LDL Cholesterol  Desirable:  <100mg/dL  Above Desirable:  100-129 mg/dL   Borderline High:  130-159  mg/dL   High:  160-189 mg/dL   Very High:  >= 190 mg/dL    Non HDL Cholesterol  Desirable:  130 mg/dL  Above Desirable:  130-159 mg/dL  Borderline High:  160-189 mg/dL  High:  190-219 mg/dL  Very High:  Greater than or equal to 220 mg/dL   TSH with free T4 reflex     Status: Normal   Result Value Ref Range    TSH 1.67 0.40 - 4.00 mU/L   CBC with platelets and differential     Status: None   Result Value Ref Range    WBC Count 8.0 4.0 - 11.0 10e3/uL    RBC Count 5.01 3.80 - 5.20 10e6/uL    Hemoglobin 15.0 11.7 - 15.7 g/dL    Hematocrit 44.2 35.0 - 47.0 %    MCV 88 78 - 100 fL    MCH 29.9 26.5 - 33.0 pg    MCHC 33.9 31.5 - 36.5 g/dL    RDW 13.4 10.0 - 15.0 %    Platelet Count 384 150 - 450 10e3/uL    % Neutrophils 66 %    % Lymphocytes 22 %    % Monocytes 9 %    % Eosinophils 2 %    % Basophils 1 %    % Immature Granulocytes 0 %    NRBCs per 100 WBC 0 <1 /100    Absolute Neutrophils 5.3 1.6 - 8.3 10e3/uL    Absolute Lymphocytes 1.8 0.8 - 5.3 10e3/uL    Absolute Monocytes 0.7 0.0 - 1.3 10e3/uL    Absolute Eosinophils 0.2 0.0 - 0.7 10e3/uL    Absolute Basophils 0.1 0.0 - 0.2 10e3/uL    Absolute Immature Granulocytes 0.0 <=0.4 10e3/uL    Absolute NRBCs 0.0 10e3/uL   CBC with Platelets & Differential     Status: None    Narrative    The following orders were created for panel order CBC with Platelets & Differential.  Procedure                               Abnormality         Status                     ---------                               -----------         ------                     CBC with platelets and d...[[id number]]                      Final result                 Please view results for these tests on the individual orders.       ASSESSMENT/PLAN:   Juana was seen today for physical.    Diagnoses and all orders for this visit:    Other microscopic hematuria  -     Albumin Random Urine Quantitative with Creat Ratio    Mixed hyperlipidemia  -     Lipid Profile, elevated slightly.  Recommend she work on diet  improvement and increasing her daily exercise.  Recheck in 3 to 6 months.  If not improved, consider starting statin at that time.    Essential hypertension  Blood pressure is at goal in clinic today.  Well managed with amlodipine and Zestoretic.  Continue without medication changes.  Patient will continue to check her blood pressure routinely at home.  -     COMPREHENSIVE METABOLIC PANEL  -     amLODIPine (NORVASC) 5 MG tablet; TAKE 1 TABLET(5 MG) BY MOUTH TWICE DAILY  -     lisinopril-hydrochlorothiazide (ZESTORETIC) 20-25 MG tablet; Take 1 tablet by mouth daily    Screening for diabetes mellitus  -     Hemoglobin A1c, in goal range at 5.5%.  Rescreen annually.    Screening for thyroid disorder  -     TSH with free T4 reflex, in goal range.  Rescreen annually.    Generalized anxiety disorder  Anxiety is well managed with daily Cymbalta, 60 mg.  She is feeling much better.  Coping well.  We will check labs today.  Refill of Cymbalta given.  -     CBC with Platelets & Differential, unremarkable  -     DULoxetine (CYMBALTA) 60 MG capsule; Take 1 capsule (60 mg) by mouth daily    Encounter for screening mammogram for breast cancer  -     MA Screen Bilateral w/Solitario; Future    Screening for colon cancer  -     COLOGUARD(EXACT SCIENCES)    Screening for lung cancer  -     varenicline (CHANTIX PASTOR) 0.5 MG X 11 & 1 MG X 42 tablet; Take 0.5 mg tab daily for 3 days, THEN 0.5 mg tab twice daily for 4 days, THEN 1 mg twice daily.  Patient is interested in smoking cessation and is asking for Chantix again.  She does not qualify for lung cancer screening as she has a less than 20 pack year history.    Tobacco use  -     fluticasone-salmeterol (ADVAIR DISKUS) 100-50 MCG/ACT inhaler; INHALE 1 PUFF INTO THE LUNGS TWICE DAILY  Working on smoking cessation.  She needs a refill of her Advair disc.    Insomnia, unspecified type  Improving with trazodone.    Patient has been advised of split billing requirements and indicates  "understanding: Yes    COUNSELING:  Reviewed preventive health counseling, as reflected in patient instructions  Special attention given to:        Regular exercise       Healthy diet/nutrition       Vision screening       Immunizations    Declined: Influenza due to Concerns about side effects/safety           Alcohol Use       Osteoporosis prevention/bone health       Colorectal Cancer Screening       Consider lung cancer screening for ages 55-80 years (77 for Medicare) and 20 pack-year smoking history        The 10-year ASCVD risk score (Alicia BRAN Jr., et al., 2013) is: 11%    Values used to calculate the score:      Age: 57 years      Sex: Female      Is Non- : No      Diabetic: No      Tobacco smoker: Yes      Systolic Blood Pressure: 122 mmHg      Is BP treated: Yes      HDL Cholesterol: 40 mg/dL      Total Cholesterol: 233 mg/dL       Advance Care Planning    Estimated body mass index is 28.98 kg/m  as calculated from the following:    Height as of 8/9/21: 1.588 m (5' 2.5\").    Weight as of 11/26/21: 73 kg (161 lb).    She reports that she has been smoking cigarettes. She has a 15.00 pack-year smoking history. She has never used smokeless tobacco.  Nicotine/Tobacco Cessation Plan:   Pharmacotherapies : varenicline (Chantix)    Counseling Resources:  ATP IV Guidelines  Pooled Cohorts Equation Calculator  Breast Cancer Risk Calculator  BRCA-Related Cancer Risk Assessment: FHS-7 Tool  FRAX Risk Assessment  ICSI Preventive Guidelines  Dietary Guidelines for Americans, 2010  USDA's MyPlate  ASA Prophylaxis  Lung CA Screening    Diane Wayne NP  [facility]  "

## 2022-10-04 ENCOUNTER — MYC MEDICAL ADVICE (OUTPATIENT)
Dept: INTERNAL MEDICINE | Facility: OTHER | Age: 57
End: 2022-10-04

## 2022-11-20 DIAGNOSIS — Z72.0 TOBACCO USE: Primary | ICD-10-CM

## 2022-11-22 RX ORDER — VARENICLINE TARTRATE 1 MG/1
TABLET, FILM COATED ORAL
Qty: 42 TABLET | Refills: 0 | Status: SHIPPED | OUTPATIENT
Start: 2022-11-22 | End: 2023-01-31

## 2022-11-22 NOTE — TELEPHONE ENCOUNTER
[facility]  [address] [place] [address]    Patient: Yuliya Robledo  MRN: [id number]  : 1965    Reason for Visit:   Chief Complaint   Patient presents with   • Sore Throat     with head congestion       History of Present Illness:  Yuliya Robledo is a 53 year old female  Comes into the office with cough congestion sore throat and sinus drainage for the past few weeks.  She has a bunch of little children staying with her who just started going to  and keep bringing colds home.  She denies any recent travel.  She does have mild allergies and has been taking a nasal spray but that does not seem to be helping her too much with her current symptoms.  The phlegm is yellowish in color    Patient's blood pressure is under good control    She does not have any headaches after her procedure.  Her skin eczema is better except for a patch on her right forearm    Review of Systems   Constitutional: Positive for fever.   HENT: Positive for facial swelling and sinus pressure.    Respiratory: Positive for cough.    Skin: Positive for rash.       ALLERGIES:  Asa buff (mag carb-al glyc); Entex pse; and Iodine   (environmental or med)    Current Outpatient Medications   Medication Sig Dispense Refill   • fluticasone (FLONASE) 50 MCG/ACT nasal spray Spray 50 mcg in each nostril every 12 hours.     • hydrochlorothiazide (HYDRODIURIL) 12.5 MG tablet Take 12.5 mg by mouth daily.     • cyclobenzaprine (FLEXERIL) 10 MG tablet Take 10 mg by mouth daily.     • amoxicillin-clavulanate (AUGMENTIN) 875-125 MG per tablet Take 1 tablet by mouth every 12 hours. Take with food. 20 tablet 0   • promethazine-dextromethorphan (PROMETHAZINE-DM) 6.25-15 MG/5ML syrup Take 5 mLs by mouth 4 times daily as needed for Cough. 240 mL 0   • fexofenadine-pseudoephedrine (ALLEGRA-D ALLERGY & CONGESTION) 180-240 MG per 24 hr tablet Take 1 tablet by mouth daily. 14 tablet 1   • esomeprazole (NEXIUM) 40 MG capsule Take 1 capsule  [facility] sent Rx request for the following:      Requested Prescriptions   Pending Prescriptions Disp Refills     varenicline (CHANTIX) 1 MG tablet [Pharmacy Med Name: VARENICLINE 1MG TABLETS] 42 tablet      Sig: TAKE 1MG TABLET BY MOUTH TWICE DAILY     Last Prescription Date:     varenicline (CHANTIX PASTOR) 0.5 MG X 11 & 1 MG X 42 tablet 53 tablet 0 9/19/2022  --   Sig: Take 0.5 mg tab daily for 3 days, THEN 0.5 mg tab twice daily for 4 days, THEN 1 mg twice daily.     Last Office Visit:              9/19/22   Future Office visit:           None    Per LOV note: Return in about 1 year (around 9/19/2023) for Annual Physical, Lab Work.    Suzie Antoine RN .............. 11/22/2022  3:54 PM       by mouth daily. 90 capsule 0     No current facility-administered medications for this visit.        No past medical history on file.    No past surgical history on file.     No family history on file.    OB History   No data available       Social History     Socioeconomic History   • Marital status: /Civil Union     Spouse name: Not on file   • Number of children: Not on file   • Years of education: Not on file   • Highest education level: Not on file   Social Needs   • Financial resource strain: Not hard at all   • Food insecurity - worry: Never true   • Food insecurity - inability: Never true   • Transportation needs - medical: No   • Transportation needs - non-medical: No   Occupational History   • Not on file   Tobacco Use   • Smoking status: Former Smoker   • Smokeless tobacco: Never Used   Substance and Sexual Activity   • Alcohol use: No     Frequency: Never   • Drug use: No   • Sexual activity: Not on file   Other Topics Concern   •  Service No   • Blood Transfusions No   • Caffeine Concern No   • Occupational Exposure No   • Hobby Hazards No   • Sleep Concern No   • Stress Concern No   • Weight Concern No   • Special Diet No   • Back Care No   • Exercise Yes   • Bike Helmet No   • Seat Belt Yes   • Self-Exams Yes   Social History Narrative   • Not on file       /70   Pulse 78   Temp 97.9 °F (36.6 °C)   Resp 16   Ht 5' 9\" (1.753 m)   Wt 93 kg (205 lb)   BMI 30.27 kg/m²   BSA 2.09 m²     Physical Exam   Constitutional: She is oriented to person, place, and time. She appears well-developed and well-nourished.   HENT:   Head: Normocephalic.   Right Ear: External ear normal.   Left Ear: External ear normal.   Nose: Nose normal.   Mouth/Throat: Oropharynx is clear and moist.   thrat red, sinus congestion   Eyes: Conjunctivae and EOM are normal. Pupils are equal, round, and reactive to light.   Neck: Normal range of motion. Neck supple.   Cardiovascular: Normal rate, regular rhythm  and normal heart sounds.   Pulmonary/Chest: Breath sounds normal.   cough   Abdominal: Soft. Bowel sounds are normal.   Musculoskeletal: Normal range of motion.   Neurological: She is alert and oriented to person, place, and time. She has normal reflexes.   Skin: Skin is warm and dry.   Psychiatric: She has a normal mood and affect. Her behavior is normal. Judgment and thought content normal.       Assessment:   ED Diagnosis   1. Acute bronchitis, unspecified organism  amoxicillin-clavulanate (AUGMENTIN) 875-125 MG per tablet    promethazine-dextromethorphan (PROMETHAZINE-DM) 6.25-15 MG/5ML syrup   2. Acute non-recurrent sinusitis, unspecified location  fexofenadine-pseudoephedrine (ALLEGRA-D ALLERGY & CONGESTION) 180-240 MG per 24 hr tablet   3. Other eczema     4. History of intracranial aneurysm     5. Essential hypertension         Plan:   She was given Augmentin 875 twice daily, Promethazine DM 1 teaspoon every 4 as needed.  Allegra-D 24 1 daily.  She is not to take the Allegra-D if she gets palpitations which she did have from Entex a few years ago.  Her strep culture was negative.

## 2023-01-30 NOTE — PROGRESS NOTES
"  Assessment & Plan     Mixed hyperlipidemia  She is due for recheck as she had elevated levels at last visit. Has been working on diet improvement and exercise at home.  - Lipid Panel. Total cholesterol has improved and normalized. LDL and non HDL improved but still slightly abnormal. Continue diet/exercise.    Tobacco use  - Greater than 3 minutes were spent on smoking cessation including encouragement to reduce cigarette use and discussion of modalities to assist with this.  - Cessation was encouraged in order to improve pain, reduce mortality, improve quality of life, and long term outcomes.  - NRT offered and Chantix was given.  - varenicline (CHANTIX) 1 MG tablet  Dispense: 42 tablet; Refill: 4  - SMOKING CESSATION COUNSELING 3-10 MIN    Essential hypertension  Stop amlopidine and lisinopril and start diovan. She is to closely monitor BP at home and anything over 140 systolic consistently needs to be communicated to provider for med change.  - valsartan-hydrochlorothiazide (DIOVAN HCT) 80-12.5 MG tablet  Dispense: 30 tablet; Refill: 0      Ordering of each unique test  Prescription drug management  20 minutes spent on the date of the encounter doing chart review, history and exam, documentation and further activities per the note     Return in about 2 weeks (around 2/14/2023) for BP Recheck - may do phone visit or send [employer] message.    Diane Wayne NP  [facility] AND [facility]    Carlos Arias is a 57 year old, presenting for the following health issues:  Patient presents with:  Recheck Medication: Refills, labs    At last visit, \"lipid profile, elevated slightly.  Recommend she work on diet improvement and increasing her daily exercise.  Recheck in 3 to 6 months.  If not improved, consider starting statin at that time.\"    Had issues with lip swelling when starting lisinopril. Initially thought it was from the cat she had just gotten, but she got rid of the cat and her lips continue to " veto. She was started on amlodipine and was taking 5 mg three times daily. BP is at goal in clinic today but she reports increased edema in face, hands, toes.     She is due for Colon cancer screening. She has Cologuard at home.  She is due for mammogram.      Tobacco Use      Smoking status: Every Day        Packs/day: 0.25        Years: 19.00        Pack years: 4.75        Types: Cigarettes      Smokeless tobacco: Never      Tobacco comments: Started smoking at age 38.  Typically 1 pack a day but cutting back   She is down to one pack per week now, smoking two times per day. She is working on smoking cessation.  - Greater than 3 minutes were spent on smoking cessation including encouragement to reduce cigarette use and discussion of modalities to assist with this.  - Cessation was encouraged in order to improve pain, reduce mortality, improve quality of life, and long term outcomes.  - NRT offered and she was given Chantix.    Review of Systems   CONSTITUTIONAL: NEGATIVE for fever, chills, change in weight  ENT/MOUTH: NEGATIVE for ear, mouth and throat problems  RESP: NEGATIVE for significant cough or SOB  CV: NEGATIVE for chest pain, palpitations or peripheral edema      Objective    /70 (BP Location: Right arm, Patient Position: Sitting, Cuff Size: Adult Regular)   Pulse 86   Temp 98.4  F (36.9  C) (Temporal)   Resp 14   Wt 70.3 kg (155 lb)   LMP  (LMP Unknown)   SpO2 97%   BMI 27.90 kg/m    Body mass index is 27.9 kg/m .  Physical Exam   GENERAL: healthy, alert and no distress  RESP: lungs clear to auscultation - no rales, rhonchi or wheezes  CV: regular rates and rhythm, peripheral pulses strong and no peripheral edema  ABDOMEN: soft, nontender and bowel sounds normal  MS: no gross musculoskeletal defects noted, no edema  NEURO: Normal strength and tone, mentation intact and speech normal  PSYCH: mentation appears normal, affect normal/bright    Results for orders placed or performed in visit on  01/31/23   Lipid Panel     Status: Abnormal   Result Value Ref Range    Cholesterol 188 <200 mg/dL    Triglycerides 112 <150 mg/dL    Direct Measure HDL 54 >=50 mg/dL    LDL Cholesterol Calculated 112 (H) <=100 mg/dL    Non HDL Cholesterol 134 (H) <130 mg/dL    Narrative    Cholesterol  Desirable:  <200 mg/dL    Triglycerides  Normal:  Less than 150 mg/dL  Borderline High:  150-199 mg/dL  High:  200-499 mg/dL  Very High:  Greater than or equal to 500 mg/dL    Direct Measure HDL  Female:  Greater than or equal to 50 mg/dL   Male:  Greater than or equal to 40 mg/dL    LDL Cholesterol  Desirable:  <100mg/dL  Above Desirable:  100-129 mg/dL   Borderline High:  130-159 mg/dL   High:  160-189 mg/dL   Very High:  >= 190 mg/dL    Non HDL Cholesterol  Desirable:  130 mg/dL  Above Desirable:  130-159 mg/dL  Borderline High:  160-189 mg/dL  High:  190-219 mg/dL  Very High:  Greater than or equal to 220 mg/dL   Extra Tube     Status: None    Narrative    The following orders were created for panel order Extra Tube.  Procedure                               Abnormality         Status                     ---------                               -----------         ------                     Extra Serum Separator Tu...[[id number]]                      Final result               Extra Purple Top Tube[[id number]]                            Final result                 Please view results for these tests on the individual orders.   Extra Serum Separator Tube (SST)     Status: None   Result Value Ref Range    Hold Specimen JIC    Extra Purple Top Tube     Status: None   Result Value Ref Range    Hold Specimen JIC          Answers for HPI/ROS submitted by the patient on 1/31/2023  Do you check your blood pressure regularly outside of the clinic?: No  Are your blood pressures ever more than 140 on the top number (systolic) OR more than 90 on the bottom number (diastolic)? (For example, greater than 140/90): Yes  Are you following a low salt  diet?: Yes

## 2023-01-31 ENCOUNTER — OFFICE VISIT (OUTPATIENT)
Dept: INTERNAL MEDICINE | Facility: OTHER | Age: 58
End: 2023-01-31
Attending: NURSE PRACTITIONER
Payer: COMMERCIAL

## 2023-01-31 VITALS
SYSTOLIC BLOOD PRESSURE: 122 MMHG | BODY MASS INDEX: 27.9 KG/M2 | DIASTOLIC BLOOD PRESSURE: 70 MMHG | WEIGHT: 155 LBS | TEMPERATURE: 98.4 F | OXYGEN SATURATION: 97 % | RESPIRATION RATE: 14 BRPM | HEART RATE: 86 BPM

## 2023-01-31 DIAGNOSIS — Z72.0 TOBACCO USE: ICD-10-CM

## 2023-01-31 DIAGNOSIS — I10 ESSENTIAL HYPERTENSION: ICD-10-CM

## 2023-01-31 DIAGNOSIS — E78.2 MIXED HYPERLIPIDEMIA: Primary | ICD-10-CM

## 2023-01-31 LAB
CHOLEST SERPL-MCNC: 188 MG/DL
HDLC SERPL-MCNC: 54 MG/DL
HOLD SPECIMEN: NORMAL
HOLD SPECIMEN: NORMAL
LDLC SERPL CALC-MCNC: 112 MG/DL
NONHDLC SERPL-MCNC: 134 MG/DL
TRIGL SERPL-MCNC: 112 MG/DL

## 2023-01-31 PROCEDURE — 99213 OFFICE O/P EST LOW 20 MIN: CPT | Performed by: NURSE PRACTITIONER

## 2023-01-31 PROCEDURE — 36415 COLL VENOUS BLD VENIPUNCTURE: CPT | Mod: ZL | Performed by: NURSE PRACTITIONER

## 2023-01-31 PROCEDURE — 80061 LIPID PANEL: CPT | Mod: ZL | Performed by: NURSE PRACTITIONER

## 2023-01-31 RX ORDER — VARENICLINE TARTRATE 1 MG/1
TABLET, FILM COATED ORAL
Qty: 42 TABLET | Refills: 4 | Status: SHIPPED | OUTPATIENT
Start: 2023-01-31 | End: 2023-03-29

## 2023-01-31 RX ORDER — VARENICLINE TARTRATE 0.5 MG/1
TABLET, FILM COATED ORAL
COMMUNITY
Start: 2022-10-06 | End: 2023-03-23

## 2023-01-31 RX ORDER — VALSARTAN AND HYDROCHLOROTHIAZIDE 80; 12.5 MG/1; MG/1
1 TABLET, FILM COATED ORAL DAILY
Qty: 30 TABLET | Refills: 0 | Status: SHIPPED | OUTPATIENT
Start: 2023-01-31 | End: 2023-02-06

## 2023-01-31 RX ORDER — VARENICLINE TARTRATE 1 MG/1
TABLET, FILM COATED ORAL
Qty: 42 TABLET | Refills: 0 | Status: SHIPPED | OUTPATIENT
Start: 2023-01-31 | End: 2023-01-31

## 2023-01-31 ASSESSMENT — PAIN SCALES - GENERAL: PAINLEVEL: NO PAIN (0)

## 2023-01-31 NOTE — NURSING NOTE
"Chief Complaint   Patient presents with     Recheck Medication     Refills, labs       Initial /70 (BP Location: Right arm, Patient Position: Sitting, Cuff Size: Adult Regular)   Pulse 86   Temp 98.4  F (36.9  C) (Temporal)   Resp 14   Wt 70.3 kg (155 lb)   LMP  (LMP Unknown)   SpO2 97%   BMI 27.90 kg/m   Estimated body mass index is 27.9 kg/m  as calculated from the following:    Height as of 9/19/22: 1.588 m (5' 2.5\").    Weight as of this encounter: 70.3 kg (155 lb).     Medication Reconciliation: complete      FOOD SECURITY SCREENING QUESTIONS:    The next two questions are to help us understand your food security.  If you are feeling you need any assistance in this area, we have resources available to support you today.    Hunger Vital Signs:  Within the past 12 months we worried whether our food would run out before we got money to buy more. Never  Within the past 12 months the food we bought just didn't last and we didn't have money to get more. Never        Advance care plan reviewed      Lexus Schofield LPN on 1/31/2023 at 8:22 AM      "

## 2023-02-02 DIAGNOSIS — Z72.0 TOBACCO USE: ICD-10-CM

## 2023-02-06 NOTE — TELEPHONE ENCOUNTER
[facility] [place] [facility] sent Rx request for the following:    Patient is requesting 90 day supply of the following:    varenicline (CHANTIX) 1 MG tablet 42 tablet 4 1/31/2023  No   Sig: TAKE 1MG TABLET BY MOUTH TWICE DAILY Strength: 1 mg     valsartan-hydrochlorothiazide (DIOVAN HCT) 80-12.5 MG tablet 30 tablet 0 1/31/2023  --   Sig - Route: Take 1 tablet by mouth daily - Oral     Last Office Visit:              1/31/23   Future Office visit:           None    Per LOV note:  Return in about 2 weeks (around 2/14/2023) for BP Recheck - may do phone visit or send [employer] message.    Suzie Antoine RN .............. 2/6/2023  9:14 AM

## 2023-02-07 RX ORDER — VARENICLINE TARTRATE 1 MG/1
TABLET, FILM COATED ORAL
Qty: 180 TABLET | Refills: 1 | OUTPATIENT
Start: 2023-02-07

## 2023-02-07 NOTE — TELEPHONE ENCOUNTER
"Patient requests a 90 day supply. Per pharmacy, disregard this request as it is intended to be a short term use. They can couple what they have together to dispense #180 with the second refill and then she will have a partial after that. Betsy Pappas RN on 2/7/2023 at 3:25 PM    Last Prescription Date: 1/31/23  Last Qty/Refills: 42 / 4  Last Office Visit: 1/31/23 Rosana  Future Office Visit: none     Requested Prescriptions   Pending Prescriptions Disp Refills     varenicline (CHANTIX) 1 MG tablet [Pharmacy Med Name: VARENICLINE 1MG TABLETS] 180 tablet      Sig: TAKE ONE TABLET BY MOUTH TWICE DAILY       Partial Cholinergic Nicotinic Agonist Agents Passed - 2/2/2023  8:17 AM        Passed - Blood pressure under 140/90 in past 12 months     BP Readings from Last 3 Encounters:   01/31/23 122/70   09/19/22 122/80   11/26/21 (!) 144/100             Passed - Recent (12 mo) or future (30 days) visit within the authorizing provider's specialty     Patient has had an office visit with the authorizing provider or a provider within the authorizing providers department within the previous 12 mos or has a future within next 30 days. See \"Patient Info\" tab in inbasket, or \"Choose Columns\" in Meds & Orders section of the refill encounter.              Passed - Medication is active on med list        Passed - Patient is 18 years of age or older        Passed - Patient is not pregnant        Passed - No positive pregnancy test on file in past 12 months               "

## 2023-02-13 RX ORDER — VALSARTAN AND HYDROCHLOROTHIAZIDE 80; 12.5 MG/1; MG/1
1 TABLET, FILM COATED ORAL DAILY
Qty: 90 TABLET | Refills: 0 | Status: SHIPPED | OUTPATIENT
Start: 2023-02-13 | End: 2023-02-21

## 2023-02-13 RX ORDER — VARENICLINE TARTRATE 1 MG/1
TABLET, FILM COATED ORAL
Qty: 180 TABLET | Refills: 0 | OUTPATIENT
Start: 2023-02-13

## 2023-02-15 DIAGNOSIS — I10 ESSENTIAL HYPERTENSION: ICD-10-CM

## 2023-02-16 ENCOUNTER — MYC MEDICAL ADVICE (OUTPATIENT)
Dept: INTERNAL MEDICINE | Facility: OTHER | Age: 58
End: 2023-02-16
Payer: COMMERCIAL

## 2023-02-16 DIAGNOSIS — I10 ESSENTIAL HYPERTENSION: ICD-10-CM

## 2023-02-17 RX ORDER — LISINOPRIL AND HYDROCHLOROTHIAZIDE 20; 25 MG/1; MG/1
1 TABLET ORAL DAILY
Qty: 90 TABLET | Refills: 2 | OUTPATIENT
Start: 2023-02-17

## 2023-02-17 NOTE — TELEPHONE ENCOUNTER
Disp Refills Start End NAZIA   lisinopril-hydrochlorothiazide (ZESTORETIC) 20-25 MG tablet (Discontinued) 90 tablet 4 9/19/2022 1/31/2023 No   Sig - Route: Take 1 tablet by mouth daily - Oral     This Order Has Been Discontinued    Order Status Reason By On   Discontinued Allergic response Diane Wayne NP 1/31/23 0831     Request denied. Vaishnavi Paiz RN  ....................  2/17/2023   3:48 PM

## 2023-02-21 RX ORDER — VALSARTAN AND HYDROCHLOROTHIAZIDE 80; 12.5 MG/1; MG/1
2 TABLET, FILM COATED ORAL DAILY
Qty: 90 TABLET | Refills: 0 | COMMUNITY
Start: 2023-02-21 | End: 2023-03-29 | Stop reason: ALTCHOICE

## 2023-03-29 ENCOUNTER — OFFICE VISIT (OUTPATIENT)
Dept: INTERNAL MEDICINE | Facility: OTHER | Age: 58
End: 2023-03-29
Attending: NURSE PRACTITIONER
Payer: COMMERCIAL

## 2023-03-29 VITALS
DIASTOLIC BLOOD PRESSURE: 84 MMHG | TEMPERATURE: 97.7 F | BODY MASS INDEX: 28.44 KG/M2 | SYSTOLIC BLOOD PRESSURE: 128 MMHG | OXYGEN SATURATION: 98 % | HEART RATE: 91 BPM | RESPIRATION RATE: 16 BRPM | WEIGHT: 158 LBS

## 2023-03-29 DIAGNOSIS — Z12.31 ENCOUNTER FOR SCREENING MAMMOGRAM FOR BREAST CANCER: ICD-10-CM

## 2023-03-29 DIAGNOSIS — Z72.0 TOBACCO USE: ICD-10-CM

## 2023-03-29 DIAGNOSIS — I10 ESSENTIAL HYPERTENSION: Primary | ICD-10-CM

## 2023-03-29 DIAGNOSIS — R60.9 WATER RETENTION: ICD-10-CM

## 2023-03-29 DIAGNOSIS — G47.00 INSOMNIA, UNSPECIFIED TYPE: ICD-10-CM

## 2023-03-29 PROCEDURE — 99406 BEHAV CHNG SMOKING 3-10 MIN: CPT | Performed by: INTERNAL MEDICINE

## 2023-03-29 PROCEDURE — 99214 OFFICE O/P EST MOD 30 MIN: CPT | Mod: 25 | Performed by: INTERNAL MEDICINE

## 2023-03-29 RX ORDER — HYDROCHLOROTHIAZIDE 25 MG/1
25 TABLET ORAL EVERY MORNING
Qty: 90 TABLET | Refills: 1 | Status: SHIPPED | OUTPATIENT
Start: 2023-03-29 | End: 2023-10-02

## 2023-03-29 RX ORDER — VALSARTAN 160 MG/1
160 TABLET ORAL EVERY EVENING
Qty: 90 TABLET | Refills: 1 | Status: SHIPPED | OUTPATIENT
Start: 2023-03-29 | End: 2023-10-02

## 2023-03-29 RX ORDER — VARENICLINE TARTRATE 1 MG/1
1 TABLET, FILM COATED ORAL 2 TIMES DAILY
Qty: 60 TABLET | Refills: 5 | Status: SHIPPED | OUTPATIENT
Start: 2023-03-29 | End: 2023-03-30

## 2023-03-29 ASSESSMENT — PAIN SCALES - GENERAL: PAINLEVEL: MILD PAIN (2)

## 2023-03-29 NOTE — PROGRESS NOTES
"  Assessment & Plan     Essential hypertension  At this time we will split out her blood pressure medication to try to get better absorption.  She will take her hydrochlorothiazide in the morning and her valsartan at night.  She will send us an update in a couple weeks.  She may need to be started on a loop diuretic if she does not feel the hydrochlorothiazide is helping with the water retention.  She is to monitor her blood pressure.  - valsartan (DIOVAN) 160 MG tablet; Take 1 tablet (160 mg) by mouth every evening  - hydrochlorothiazide (HYDRODIURIL) 25 MG tablet; Take 1 tablet (25 mg) by mouth every morning    Water retention  See above.  - hydrochlorothiazide (HYDRODIURIL) 25 MG tablet; Take 1 tablet (25 mg) by mouth every morning    Tobacco use  - Greater than 3 minutes were spent on smoking cessation including encouragement to reduce cigarette use and discussion of modalities to assist with this  - cessation was encouraged in order to improve pain, quality of life and long term outcomes  - Brochures/handouts provided  - NRT offered and declined today, continue with Chantix  - varenicline (CHANTIX) 1 MG tablet; Take 1 tablet (1 mg) by mouth 2 times daily  - SMOKING CESSATION COUNSELING 3-10 MIN    Insomnia, unspecified type  Overall stable    Encounter for screening mammogram for breast cancer  - MA Screening Bilateral w/ Solitario; Future     BMI:   Estimated body mass index is 28.44 kg/m  as calculated from the following:    Height as of 9/19/22: 1.588 m (5' 2.5\").    Weight as of this encounter: 71.7 kg (158 lb).   Weight management plan: Discussed healthy diet and exercise guidelines      Return in about 6 months (around 9/29/2023) for Annual Review with renewal of all medications.    Olya Loomis, [facility] AND [facility]    Carlos Arias is a 57 year old, presenting for the following health issues:  Recheck Medication    Additional Questions 3/29/2023   Roomed by Cindy Luz CMA "     Patient Reported Additional Medications 3/29/2023   Patient reports taking the following new medications Multivitamin, mineral supplement (Immuno 150), Vitamin D3, Biotin and Collagen     Patient presents for follow-up of blood pressure.  She reports that her blood pressure has been improved with the increased dose of valsartan/hydrochlorothiazide.  She however does not necessarily feel that she is having significant improvement in her water retention.  She is interested in considering other options.  She found that her lisinopril/hydrochlorothiazide seem to work much better from a diuretic standpoint.  She does not feel that she eats excessive salt however does eat cheese which we discussed today.    She has been cutting back on her tobacco use.  She is down to 5 cigarettes daily.  We discussed in depth today continued improvement in this.  She has had decreased cravings with use of Chantix.  She has not noted any side effects from the medication.    She has a history of insomnia.  She has been on trazodone.  She only takes this intermittently and has not needed to use it on a regular basis as she has been sleeping a little bit better.    She is due for a mammogram.  She was also [facility]    History of Present Illness       Hypertension: She presents for follow up of hypertension.  She does check blood pressure  regularly outside of the clinic. Outside blood pressures have been over 140/90. She follows a low salt diet.     She eats 2-3 servings of fruits and vegetables daily.She consumes 1 sweetened beverage(s) daily.She exercises with enough effort to increase her heart rate 20 to 29 minutes per day.  She exercises with enough effort to increase her heart rate 7 days per week. She is missing 1 dose(s) of medications per week.         Review of Systems   No fevers      Objective    /84 (BP Location: Right arm, Patient Position: Sitting, Cuff  Size: Adult Large)   Pulse 91   Temp 97.7  F (36.5  C) (Temporal)   Resp 16   Wt 71.7 kg (158 lb)   LMP  (LMP Unknown)   SpO2 98%   BMI 28.44 kg/m    Body mass index is 28.44 kg/m .  Physical Exam   GEN: Vitals reviewed. Healthy appearing. Patient is in no acute distress. Cooperative with exam.  HEENT: Normocephalic atraumatic.  Eyes grossly normal to inspection.  No discharge or erythema, or obvious scleral/conjunctival abnormalities.   LUNGS: No audible wheeze, cough, or visible cyanosis.  No visible retractions or increased work of breathing.    ABD:  nondistended  SKIN: Warm and dry to touch.  Visible skin clear. No significant rash, abnormal pigmentation or lesions.

## 2023-03-29 NOTE — NURSING NOTE
Patient presents to clinic today for follow up on medications.  Medication review completed.    Advanced Care Planning discussed/reviewed.    FOOD SECURITY SCREENING QUESTIONS    The next two questions are to help us understand your food security.  If you are feeling you need any assistance in this area, we have resources available to support you today.    Hunger Vital Signs:  Within the past 12 months we worried whether our food would run out before we got money to buy more. Never  Within the past 12 months the food we bought just didn't last and we didn't have money to get more. Never    Cindy Luz CMA([facility])..................3/29/2023   2:55 PM

## 2023-03-30 RX ORDER — VARENICLINE TARTRATE 1 MG/1
TABLET, FILM COATED ORAL
Qty: 180 TABLET | Refills: 1 | Status: SHIPPED | OUTPATIENT
Start: 2023-03-30 | End: 2023-10-02

## 2023-03-30 NOTE — TELEPHONE ENCOUNTER
[facility] sent Rx request for the following:      Requested Prescriptions   Pending Prescriptions Disp Refills     varenicline (CHANTIX) 1 MG tablet [Pharmacy Med Name: VARENICLINE 1MG TABLETS] 180 tablet      Sig: TAKE 1 TABLET(1 MG) BY MOUTH TWICE DAILY       Partial Cholinergic Nicotinic Agonist Agents Passed - 3/29/2023  3:48 PM     Last Prescription Date:   3/29/23  Last Fill Qty/Refills:         60, R-5    Last Office Visit:               3/29/23  Future Office visit:           10/2/2023    Will update order to reflect 90 days supply and 1 refill to equal current order. Zaira Lawrence RN on 3/30/2023 at 2:55 PM

## 2023-04-03 ENCOUNTER — HOSPITAL ENCOUNTER (OUTPATIENT)
Dept: GENERAL RADIOLOGY | Facility: OTHER | Age: 58
Discharge: HOME | End: 2023-04-03
Attending: ORTHOPAEDIC SURGERY
Payer: COMMERCIAL

## 2023-04-03 ENCOUNTER — OFFICE VISIT (OUTPATIENT)
Dept: ORTHOPEDICS | Facility: OTHER | Age: 58
End: 2023-04-03
Attending: ORTHOPAEDIC SURGERY
Payer: COMMERCIAL

## 2023-04-03 DIAGNOSIS — M25.511 CHRONIC RIGHT SHOULDER PAIN: ICD-10-CM

## 2023-04-03 DIAGNOSIS — G89.29 CHRONIC RIGHT SHOULDER PAIN: ICD-10-CM

## 2023-04-03 DIAGNOSIS — G89.29 CHRONIC RIGHT SHOULDER PAIN: Primary | ICD-10-CM

## 2023-04-03 DIAGNOSIS — M25.511 CHRONIC RIGHT SHOULDER PAIN: Primary | ICD-10-CM

## 2023-04-03 PROCEDURE — 250N000011 HC RX IP 250 OP 636: Mod: JW | Performed by: ORTHOPAEDIC SURGERY

## 2023-04-03 PROCEDURE — 20610 DRAIN/INJ JOINT/BURSA W/O US: CPT | Mod: RT | Performed by: ORTHOPAEDIC SURGERY

## 2023-04-03 PROCEDURE — 250N000009 HC RX 250: Performed by: ORTHOPAEDIC SURGERY

## 2023-04-03 PROCEDURE — 73030 X-RAY EXAM OF SHOULDER: CPT | Mod: RT

## 2023-04-03 RX ORDER — LIDOCAINE HYDROCHLORIDE 10 MG/ML
0.5 INJECTION, SOLUTION EPIDURAL; INFILTRATION; INTRACAUDAL; PERINEURAL ONCE
Status: COMPLETED | OUTPATIENT
Start: 2023-04-03 | End: 2023-04-03

## 2023-04-03 RX ORDER — TRIAMCINOLONE ACETONIDE 40 MG/ML
20 INJECTION, SUSPENSION INTRA-ARTICULAR; INTRAMUSCULAR ONCE
Status: COMPLETED | OUTPATIENT
Start: 2023-04-03 | End: 2023-04-03

## 2023-04-03 RX ADMIN — TRIAMCINOLONE ACETONIDE 20 MG: 40 INJECTION, SUSPENSION INTRA-ARTICULAR; INTRAMUSCULAR at 08:47

## 2023-04-03 RX ADMIN — LIDOCAINE HYDROCHLORIDE 0.5 ML: 10 INJECTION, SOLUTION EPIDURAL; INFILTRATION; INTRACAUDAL; PERINEURAL at 08:47

## 2023-04-03 NOTE — PROGRESS NOTES
Patient is here for follow up on her right shoulder pain.  Janelle Beckman LPN .....................4/3/2023 8:18 AM

## 2023-04-03 NOTE — PROGRESS NOTES
Visit Date: 2023    HISTORY OF PRESENT ILLNESS:  This is a 57-year-old female who we last saw back in 2020 for her right shoulder.  She has a differing pain than what she had previously.  She was given a subacromial injection and her shoulder pain completely went away back in 2020.  Currently, she is having pain in a different spot, which is more over the top of the shoulder and down the front and in the back.  She cannot lie on this side and has difficulty with doing anything overhead.    IMAGING:  X-ray examination of the shoulder taken today shows marked AC joint arthritis.  There is good maintenance of the acromiohumeral distance.   No significant glenohumeral arthritis in the glenohumeral joint.    IMPRESSION AND PLAN:  This is a 57-year-old female with acromioclavicular joint arthritis of her right shoulder.  We have given her an injection into the AC joint today.  She tolerated this well and had good relief of her pain.  We are going to see her back on an as-needed basis for this if her shoulder pain returns.    Braeden Salazar MD        D: 2023   T: 2023   MT: KAROLINA    Name:     TONY GALINDO  MRN:      [id number]        Account:    [id number]   :      1965           Visit Date: 2023     Document: [id number]

## 2023-05-04 ENCOUNTER — TRANSFERRED RECORDS (OUTPATIENT)
Dept: HEALTH INFORMATION MANAGEMENT | Facility: OTHER | Age: 58
End: 2023-05-04

## 2023-06-05 ENCOUNTER — OFFICE VISIT (OUTPATIENT)
Dept: INTERNAL MEDICINE | Facility: OTHER | Age: 58
End: 2023-06-05
Payer: COMMERCIAL

## 2023-06-05 VITALS
DIASTOLIC BLOOD PRESSURE: 82 MMHG | RESPIRATION RATE: 20 BRPM | HEIGHT: 63 IN | WEIGHT: 161.38 LBS | BODY MASS INDEX: 28.59 KG/M2 | HEART RATE: 81 BPM | SYSTOLIC BLOOD PRESSURE: 130 MMHG | TEMPERATURE: 98 F | OXYGEN SATURATION: 97 %

## 2023-06-05 DIAGNOSIS — E78.2 MIXED HYPERLIPIDEMIA: ICD-10-CM

## 2023-06-05 DIAGNOSIS — M21.612 BILATERAL BUNIONS: ICD-10-CM

## 2023-06-05 DIAGNOSIS — Z72.0 TOBACCO USE: ICD-10-CM

## 2023-06-05 DIAGNOSIS — Z01.818 PREOP GENERAL PHYSICAL EXAM: Primary | ICD-10-CM

## 2023-06-05 DIAGNOSIS — I10 ESSENTIAL HYPERTENSION: ICD-10-CM

## 2023-06-05 DIAGNOSIS — M21.611 BILATERAL BUNIONS: ICD-10-CM

## 2023-06-05 LAB
ALBUMIN SERPL BCG-MCNC: 4.3 G/DL (ref 3.5–5.2)
ALP SERPL-CCNC: 94 U/L (ref 35–104)
ALT SERPL W P-5'-P-CCNC: 14 U/L (ref 10–35)
ANION GAP SERPL CALCULATED.3IONS-SCNC: 9 MMOL/L (ref 7–15)
AST SERPL W P-5'-P-CCNC: 17 U/L (ref 10–35)
BASOPHILS # BLD AUTO: 0.1 10E3/UL (ref 0–0.2)
BASOPHILS NFR BLD AUTO: 1 %
BILIRUB SERPL-MCNC: <0.2 MG/DL
BUN SERPL-MCNC: 14.9 MG/DL (ref 8–23)
CALCIUM SERPL-MCNC: 9.4 MG/DL (ref 8.6–10)
CHLORIDE SERPL-SCNC: 104 MMOL/L (ref 98–107)
CREAT SERPL-MCNC: 0.77 MG/DL (ref 0.51–0.95)
DEPRECATED HCO3 PLAS-SCNC: 29 MMOL/L (ref 22–29)
EOSINOPHIL # BLD AUTO: 0.2 10E3/UL (ref 0–0.7)
EOSINOPHIL NFR BLD AUTO: 2 %
ERYTHROCYTE [DISTWIDTH] IN BLOOD BY AUTOMATED COUNT: 13.3 % (ref 10–15)
GFR SERPL CREATININE-BSD FRML MDRD: 88 ML/MIN/1.73M2
GLUCOSE SERPL-MCNC: 85 MG/DL (ref 70–99)
HCT VFR BLD AUTO: 42.7 % (ref 35–47)
HGB BLD-MCNC: 14.2 G/DL (ref 11.7–15.7)
HOLD SPECIMEN: NORMAL
IMM GRANULOCYTES # BLD: 0 10E3/UL
IMM GRANULOCYTES NFR BLD: 0 %
LYMPHOCYTES # BLD AUTO: 1.9 10E3/UL (ref 0.8–5.3)
LYMPHOCYTES NFR BLD AUTO: 22 %
MCH RBC QN AUTO: 30.3 PG (ref 26.5–33)
MCHC RBC AUTO-ENTMCNC: 33.3 G/DL (ref 31.5–36.5)
MCV RBC AUTO: 91 FL (ref 78–100)
MONOCYTES # BLD AUTO: 0.8 10E3/UL (ref 0–1.3)
MONOCYTES NFR BLD AUTO: 9 %
NEUTROPHILS # BLD AUTO: 5.6 10E3/UL (ref 1.6–8.3)
NEUTROPHILS NFR BLD AUTO: 66 %
NRBC # BLD AUTO: 0 10E3/UL
NRBC BLD AUTO-RTO: 0 /100
PLATELET # BLD AUTO: 352 10E3/UL (ref 150–450)
POTASSIUM SERPL-SCNC: 4.1 MMOL/L (ref 3.4–5.3)
PROT SERPL-MCNC: 7.2 G/DL (ref 6.4–8.3)
RBC # BLD AUTO: 4.68 10E6/UL (ref 3.8–5.2)
SODIUM SERPL-SCNC: 142 MMOL/L (ref 136–145)
WBC # BLD AUTO: 8.7 10E3/UL (ref 4–11)

## 2023-06-05 PROCEDURE — 85025 COMPLETE CBC W/AUTO DIFF WBC: CPT | Mod: ZL

## 2023-06-05 PROCEDURE — 80053 COMPREHEN METABOLIC PANEL: CPT | Mod: ZL

## 2023-06-05 PROCEDURE — 99214 OFFICE O/P EST MOD 30 MIN: CPT

## 2023-06-05 PROCEDURE — 36415 COLL VENOUS BLD VENIPUNCTURE: CPT | Mod: ZL

## 2023-06-05 ASSESSMENT — PATIENT HEALTH QUESTIONNAIRE - PHQ9
SUM OF ALL RESPONSES TO PHQ QUESTIONS 1-9: 2
10. IF YOU CHECKED OFF ANY PROBLEMS, HOW DIFFICULT HAVE THESE PROBLEMS MADE IT FOR YOU TO DO YOUR WORK, TAKE CARE OF THINGS AT HOME, OR GET ALONG WITH OTHER PEOPLE: NOT DIFFICULT AT ALL
SUM OF ALL RESPONSES TO PHQ QUESTIONS 1-9: 2

## 2023-06-05 ASSESSMENT — ANXIETY QUESTIONNAIRES
7. FEELING AFRAID AS IF SOMETHING AWFUL MIGHT HAPPEN: NOT AT ALL
4. TROUBLE RELAXING: NOT AT ALL
2. NOT BEING ABLE TO STOP OR CONTROL WORRYING: NOT AT ALL
5. BEING SO RESTLESS THAT IT IS HARD TO SIT STILL: NOT AT ALL
8. IF YOU CHECKED OFF ANY PROBLEMS, HOW DIFFICULT HAVE THESE MADE IT FOR YOU TO DO YOUR WORK, TAKE CARE OF THINGS AT HOME, OR GET ALONG WITH OTHER PEOPLE?: NOT DIFFICULT AT ALL
6. BECOMING EASILY ANNOYED OR IRRITABLE: NOT AT ALL
GAD7 TOTAL SCORE: 0
IF YOU CHECKED OFF ANY PROBLEMS ON THIS QUESTIONNAIRE, HOW DIFFICULT HAVE THESE PROBLEMS MADE IT FOR YOU TO DO YOUR WORK, TAKE CARE OF THINGS AT HOME, OR GET ALONG WITH OTHER PEOPLE: NOT DIFFICULT AT ALL
1. FEELING NERVOUS, ANXIOUS, OR ON EDGE: NOT AT ALL
7. FEELING AFRAID AS IF SOMETHING AWFUL MIGHT HAPPEN: NOT AT ALL
GAD7 TOTAL SCORE: 0
3. WORRYING TOO MUCH ABOUT DIFFERENT THINGS: NOT AT ALL
GAD7 TOTAL SCORE: 0

## 2023-06-05 ASSESSMENT — PAIN SCALES - GENERAL: PAINLEVEL: NO PAIN (0)

## 2023-06-05 NOTE — PROGRESS NOTES
[facility]  [address]  [place] [address]  Phone: [phone]  Primary Provider: Olya Loomis  Pre-op Performing Provider: MARTA CHAMPION      PREOPERATIVE EVALUATION:  Today's date: 6/5/2023    Juana Farrell is a 57 year old female who presents for a preoperative evaluation.    Surgical Information:  Surgery/Procedure: Left Bunionectomy   Surgery Location: [facility]  Surgeon: Dr. Morse  Surgery Date: 6-  Time of Surgery: TBD  Where patient plans to recover: At home with family  Fax number for surgical facility:     Assessment & Plan     The proposed surgical procedure is considered INTERMEDIATE risk.      ICD-10-CM    1. Preop general physical exam  Z01.818 Comprehensive Metabolic Panel     CBC with Platelets & Differential ([facility] Only)     Comprehensive Metabolic Panel     CBC with Platelets & Differential ([facility] Only)      2. Bilateral bunions  M21.611     M21.612       3. Tobacco use  Z72.0       4. Essential hypertension  I10       5. Mixed hyperlipidemia  E78.2              - No identified additional risk factors other than previously addressed    Antiplatelet or Anticoagulation Medication Instructions:   - Patient is on no antiplatelet or anticoagulation medications.    Additional Medication Instructions:   - ACE/ARB: Continue without modification (e.g., MAC anesthesia, neurosurgery, spine surgery, heart failure, or labile hypertension with risk of hypertension).    RECOMMENDATION:  APPROVAL GIVEN to proceed with proposed procedure, without further diagnostic evaluation.    CBC and CMP are normal today.  Surgical risk is increased by being on a every day smoker, hypertension-although this is stable.  Possible sleep apnea.          Subjective       HPI related to upcoming procedure: Patient is scheduled for a left bunionectomy by Dr. Morse at [facility] on 6-.  She does have bilateral bunions, is having her right  bunion removed in September.  She states that her feet have been giving her a lot of pain and she is looking forward to upcoming procedure.  Denies any new changes in her health, has tolerated anesthesia without difficulties in the past, is able to walk up a flight of stairs without feeling short of breath.        6/5/2023     8:46 AM   Preop Questions   1. Have you ever had a heart attack or stroke? No   2. Have you ever had surgery on your heart or blood vessels, such as a stent placement, a coronary artery bypass, or surgery on an artery in your head, neck, heart, or legs? No   3. Do you have chest pain with activity? No   4. Do you have a history of  heart failure? No   5. Do you currently have a cold, bronchitis or symptoms of other infection? No   6. Do you have a cough, shortness of breath, or wheezing? No   7. Do you or anyone in your family have previous history of blood clots? No   8. Do you or does anyone in your family have a serious bleeding problem such as prolonged bleeding following surgeries or cuts? No   9. Have you ever had problems with anemia or been told to take iron pills? No   10. Have you had any abnormal blood loss such as black, tarry or bloody stools, or abnormal vaginal bleeding? No   11. Have you ever had a blood transfusion? YES- In 1989 during ectopic pregnancy   11a. Have you ever had a transfusion reaction? UNKNOWN    12. Are you willing to have a blood transfusion if it is medically needed before, during, or after your surgery? Yes   13. Have you or any of your relatives ever had problems with anesthesia? YES - Two brothers that had difficulty with anesthesia- unsure what happened- No personal problems with anesthesia in the past   14. Do you have sleep apnea, excessive snoring or daytime drowsiness? UNKNOWN - Has been told that she snores- does have some occasional daytime fatigue   15. Do you have any artifical heart valves or other implanted medical devices like a pacemaker,  defibrillator, or continuous glucose monitor? No   16. Do you have artificial joints? No   17. Are you allergic to latex? No   18. Is there any chance that you may be pregnant? No       Health Care Directive:  Patient does not have a Health Care Directive or Living Will: Discussed advance care planning with patient; information given to patient to review.    Preoperative Review of :   reviewed - no record of controlled substances prescribed.      Status of Chronic Conditions:    HYPERTENSION - Patient has longstanding history of HTN , currently denies any symptoms referable to elevated blood pressure. Specifically denies chest pain, palpitations, dyspnea, orthopnea, PND or peripheral edema. Blood pressure readings have been in normal range. Current medication regimen is as listed below. Patient denies any side effects of medication.       Review of Systems  CONSTITUTIONAL: NEGATIVE for fever, chills, change in weight  INTEGUMENTARY/SKIN: NEGATIVE for worrisome rashes, moles or lesions  EYES: NEGATIVE for vision changes or irritation  ENT/MOUTH: NEGATIVE for ear, mouth and throat problems  RESP: NEGATIVE for significant cough or SOB  CV: NEGATIVE for chest pain, palpitations or peripheral edema  : NEGATIVE for frequency, dysuria, or hematuria  MUSCULOSKELETAL: NEGATIVE for significant arthralgias or myalgia  NEURO: NEGATIVE for weakness, dizziness or paresthesias  ENDOCRINE: NEGATIVE for temperature intolerance, skin/hair changes  HEME: NEGATIVE for bleeding problems  PSYCHIATRIC: NEGATIVE for changes in mood or affect    Patient Active Problem List    Diagnosis Date Noted     Death of  08/09/2021     Priority: Medium     Other microscopic hematuria 08/09/2021     Priority: Medium     Tobacco use 08/08/2021     Priority: Medium     Anxiety 08/08/2021     Priority: Medium     Screening for hyperlipidemia 08/08/2021     Priority: Medium     Screening for colon cancer 08/08/2021     Priority: Medium      Abnormal mammogram 2021     Priority: Medium     Screening for diabetes mellitus 2021     Priority: Medium     Screening for thyroid disorder 2021     Priority: Medium     Asymptomatic postmenopausal status 2021     Priority: Medium     Essential hypertension 2019     Priority: Medium     Mixed hyperlipidemia 10/30/2018     Priority: Medium     Nicotine addiction 2018     Priority: Medium     Generalized anxiety disorder 2013     Priority: Medium     Chronic tension headaches 10/03/2012     Priority: Medium      Past Medical History:   Diagnosis Date     Acquired hallux valgus of left foot     Bilateral hallux valgus     Anxiety and depression 1995     Benign essential hypertension 12/3/2019     Chronic tension headaches 10/3/2012     Dermatitis     Dermatitis of the ear canals     Mixed hyperlipidemia 10/30/2018     Nicotine addiction 2018     Otitis externa      Past Surgical History:   Procedure Laterality Date      SECTION      2 C-Sections     COLONOSCOPY      within normal limits.     ECTOPIC PREGNANCY SURGERY      Two ectopic pregnancies; with one the left tube removed     HYSTERECTOMY TOTAL ABDOMINAL  2009    Total abdominal hysterectomy by Dr. 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 Carter     Current Outpatient Medications   Medication Sig Dispense Refill     DULoxetine (CYMBALTA) 60 MG capsule Take 1 capsule (60 mg) by mouth daily 90 capsule 4     fluticasone-salmeterol (ADVAIR DISKUS) 100-50 MCG/ACT inhaler INHALE 1 PUFF INTO THE LUNGS TWICE DAILY 180 each 4     hydrochlorothiazide (HYDRODIURIL) 25 MG tablet Take 1 tablet (25 mg) by mouth every morning 90 tablet 1     neomycin-polymyxin-hydrocortisone (CORTISPORIN) 3.5-22312-3 otic suspension Place 4 drops into both ears 3 times  "daily (Patient taking differently: Place 4 drops into both ears 3 times daily As needed) 10 mL 4     valACYclovir (VALTREX) 1000 mg tablet Take 1 tablet (1,000 mg) by mouth 3 times daily (Patient taking differently: Take 1,000 mg by mouth 2 times daily) 21 tablet 3     valsartan (DIOVAN) 160 MG tablet Take 1 tablet (160 mg) by mouth every evening 90 tablet 1     varenicline (CHANTIX) 1 MG tablet TAKE 1 TABLET(1 MG) BY MOUTH TWICE DAILY 180 tablet 1     Misc Natural Products (LUTEIN 20) CAPS Take 1 capsule by mouth daily (Patient not taking: Reported on 6/5/2023)       traZODone (DESYREL) 50 MG tablet Take 0.5-1 tablets (25-50 mg) by mouth At Bedtime (Patient not taking: Reported on 6/5/2023) 90 tablet 0       Allergies   Allergen Reactions     Amoxicillin Swelling     lips     Lisinopril Swelling     Atorvastatin Other (See Comments)     Bad stomach pain, nausea     Morphine Hives     Itchy rash at time of birth     Paroxetine Nausea        Social History     Tobacco Use     Smoking status: Every Day     Packs/day: 0.25     Years: 19.00     Pack years: 4.75     Types: Cigarettes     Smokeless tobacco: Never     Tobacco comments:     Started smoking at age 38.  Typically 1 pack a day but cutting back - working on quitting and is now down to about 5 per day. 3/29/23   Vaping Use     Vaping status: Never Used   Substance Use Topics     Alcohol use: No     Alcohol/week: 0.0 standard drinks of alcohol     Comment: Alcoholic Drinks/day: rare       History   Drug Use No     Comment:           Objective     /82 (BP Location: Right arm, Patient Position: Sitting, Cuff Size: Adult Regular)   Pulse 81   Temp 98  F (36.7  C)   Resp 20   Ht 1.6 m (5' 3\")   Wt 73.2 kg (161 lb 6 oz)   LMP  (LMP Unknown)   SpO2 97%   BMI 28.59 kg/m      Physical Exam  Vitals reviewed.   Constitutional:       General: She is not in acute distress.     Appearance: She is well-developed.   HENT:      Head: Normocephalic and atraumatic. "      Nose: Nose normal.      Mouth/Throat:      Pharynx: No oropharyngeal exudate.   Eyes:      General: No scleral icterus.     Conjunctiva/sclera: Conjunctivae normal.      Pupils: Pupils are equal, round, and reactive to light.   Neck:      Thyroid: No thyromegaly.   Cardiovascular:      Rate and Rhythm: Normal rate and regular rhythm.      Heart sounds: No murmur heard.  Pulmonary:      Effort: Pulmonary effort is normal. No respiratory distress.      Breath sounds: Normal breath sounds. No wheezing.   Musculoskeletal:         General: No tenderness or deformity. Normal range of motion.      Cervical back: Normal range of motion and neck supple.   Skin:     General: Skin is warm and dry.      Findings: No rash.   Neurological:      Mental Status: She is alert and oriented to person, place, and time.      Cranial Nerves: No cranial nerve deficit.      Coordination: Coordination normal.   Psychiatric:         Behavior: Behavior normal.         Thought Content: Thought content normal.         Judgment: Judgment normal.           Diagnostics:  Recent Results (from the past 24 hour(s))   Comprehensive Metabolic Panel    Collection Time: 06/05/23  9:13 AM   Result Value Ref Range    Sodium 142 136 - 145 mmol/L    Potassium 4.1 3.4 - 5.3 mmol/L    Chloride 104 98 - 107 mmol/L    Carbon Dioxide (CO2) 29 22 - 29 mmol/L    Anion Gap 9 7 - 15 mmol/L    Urea Nitrogen 14.9 8.0 - 23.0 mg/dL    Creatinine 0.77 0.51 - 0.95 mg/dL    Calcium 9.4 8.6 - 10.0 mg/dL    Glucose 85 70 - 99 mg/dL    Alkaline Phosphatase 94 35 - 104 U/L    AST 17 10 - 35 U/L    ALT 14 10 - 35 U/L    Protein Total 7.2 6.4 - 8.3 g/dL    Albumin 4.3 3.5 - 5.2 g/dL    Bilirubin Total <0.2 <=1.2 mg/dL    GFR Estimate 88 >60 mL/min/1.73m2   CBC with platelets and differential    Collection Time: 06/05/23  9:13 AM   Result Value Ref Range    WBC Count 8.7 4.0 - 11.0 10e3/uL    RBC Count 4.68 3.80 - 5.20 10e6/uL    Hemoglobin 14.2 11.7 - 15.7 g/dL    Hematocrit  42.7 35.0 - 47.0 %    MCV 91 78 - 100 fL    MCH 30.3 26.5 - 33.0 pg    MCHC 33.3 31.5 - 36.5 g/dL    RDW 13.3 10.0 - 15.0 %    Platelet Count 352 150 - 450 10e3/uL    % Neutrophils 66 %    % Lymphocytes 22 %    % Monocytes 9 %    % Eosinophils 2 %    % Basophils 1 %    % Immature Granulocytes 0 %    NRBCs per 100 WBC 0 <1 /100    Absolute Neutrophils 5.6 1.6 - 8.3 10e3/uL    Absolute Lymphocytes 1.9 0.8 - 5.3 10e3/uL    Absolute Monocytes 0.8 0.0 - 1.3 10e3/uL    Absolute Eosinophils 0.2 0.0 - 0.7 10e3/uL    Absolute Basophils 0.1 0.0 - 0.2 10e3/uL    Absolute Immature Granulocytes 0.0 <=0.4 10e3/uL    Absolute NRBCs 0.0 10e3/uL      No EKG required, no history of coronary heart disease, significant arrhythmia, peripheral arterial disease or other structural heart disease.    Revised Cardiac Risk Index (RCRI):  The patient has the following serious cardiovascular risks for perioperative complications:   - No serious cardiac risks = 0 points     RCRI Interpretation: 0 points: Class I (very low risk - 0.4% complication rate)         Signed Electronically by: CYRUS Rangel CNP  Copy of this evaluation report is provided to requesting physician.

## 2023-06-05 NOTE — PATIENT INSTRUCTIONS
For informational purposes only. Not to replace the advice of your health care provider. Copyright   2003,  [facility]. All rights reserved. Clinically reviewed by Mandy Durham MD. Repeatit [id number] - REV .  Preparing for Your Surgery  Getting started  A nurse will call you to review your health history and instructions. They will give you an arrival time based on your scheduled surgery time. Please be ready to share:  Your doctor's clinic name and phone number  Your medical, surgical, and anesthesia history  A list of allergies and sensitivities  A list of medicines, including herbal treatments and over-the-counter drugs  Whether the patient has a legal guardian (ask how to send us the papers in advance)  Please tell us if you're pregnant--or if there's any chance you might be pregnant. Some surgeries may injure a fetus (unborn baby), so they require a pregnancy test. Surgeries that are safe for a fetus don't always need a test, and you can choose whether to have one.   If you have a child who's having surgery, please ask for a copy of Preparing for Your Child's Surgery.    Preparing for surgery  Within 10 to 30 days of surgery: Have a pre-op exam (sometimes called an H&P, or History and Physical). This can be done at a clinic or pre-operative center.  If you're having a , you may not need this exam. Talk to your care team.  At your pre-op exam, talk to your care team about all medicines you take. If you need to stop any medicines before surgery, ask when to start taking them again.  We do this for your safety. Many medicines can make you bleed too much during surgery. Some change how well surgery (anesthesia) drugs work.  Call your insurance company to let them know you're having surgery. (If you don't have insurance, call [phone].)  Call your clinic if there's any change in your health. This includes signs of a cold or flu (sore throat, runny nose, cough, rash, fever). It  also includes a scrape or scratch near the surgery site.  If you have questions on the day of surgery, call your hospital or surgery center.  Eating and drinking guidelines  For your safety: Unless your surgeon tells you otherwise, follow the guidelines below.  Eat and drink as usual until 8 hours before you arrive for surgery. After that, no food or milk.  Drink clear liquids until 2 hours before you arrive. These are liquids you can see through, like water, Gatorade, and Propel Water. They also include plain black coffee and tea (no cream or milk), candy, and breath mints. You can spit out gum when you arrive.  If you drink alcohol: Stop drinking it the night before surgery.  If your care team tells you to take medicine on the morning of surgery, it's okay to take it with a sip of water.  Preventing infection  Shower or bathe the night before and morning of your surgery. Follow the instructions your clinic gave you. (If no instructions, use regular soap.)  Don't shave or clip hair near your surgery site. We'll remove the hair if needed.  Don't smoke or vape the morning of surgery. You may chew nicotine gum up to 2 hours before surgery. A nicotine patch is okay.  Note: Some surgeries require you to completely quit smoking and nicotine. Check with your surgeon.  Your care team will make every effort to keep you safe from infection. We will:  Clean our hands often with soap and water (or an alcohol-based hand rub).  Clean the skin at your surgery site with a special soap that kills germs.  Give you a special gown to keep you warm. (Cold raises the risk of infection.)  Wear special hair covers, masks, gowns and gloves during surgery.  Give antibiotic medicine, if prescribed. Not all surgeries need antibiotics.  What to bring on the day of surgery  Photo ID and insurance card  Copy of your health care directive, if you have one  Glasses and hearing aids (bring cases)  You can't wear contacts during surgery  Inhaler and  eye drops, if you use them (tell us about these when you arrive)  CPAP machine or breathing device, if you use them  A few personal items, if spending the night  If you have . . .  A pacemaker, ICD (cardiac defibrillator) or other implant: Bring the ID card.  An implanted stimulator: Bring the remote control.  A legal guardian: Bring a copy of the certified (court-stamped) guardianship papers.  Please remove any jewelry, including body piercings. Leave jewelry and other valuables at home.  If you're going home the day of surgery  You must have a responsible adult drive you home. They should stay with you overnight as well.  If you don't have someone to stay with you, and you aren't safe to go home alone, we may keep you overnight. Insurance often won't pay for this.  After surgery  If it's hard to control your pain or you need more pain medicine, please call your surgeon's office.  Questions?   If you have any questions for your care team, list them here: _________________________________________________________________________________________________________________________________________________________________________ ____________________________________ ____________________________________ ____________________________________    How to Take Your Medication Before Surgery  - Take all of your medications before surgery as usual  - No ibuprofen, aspirin or naproxen 7 days before surgery

## 2023-06-13 ENCOUNTER — TRANSFERRED RECORDS (OUTPATIENT)
Dept: HEALTH INFORMATION MANAGEMENT | Facility: OTHER | Age: 58
End: 2023-06-13

## 2023-07-05 ENCOUNTER — TRANSFERRED RECORDS (OUTPATIENT)
Dept: HEALTH INFORMATION MANAGEMENT | Facility: OTHER | Age: 58
End: 2023-07-05
Payer: COMMERCIAL

## 2023-07-12 ENCOUNTER — TRANSFERRED RECORDS (OUTPATIENT)
Dept: HEALTH INFORMATION MANAGEMENT | Facility: OTHER | Age: 58
End: 2023-07-12
Payer: COMMERCIAL

## 2023-08-09 ENCOUNTER — TRANSFERRED RECORDS (OUTPATIENT)
Dept: HEALTH INFORMATION MANAGEMENT | Facility: OTHER | Age: 58
End: 2023-08-09
Payer: COMMERCIAL

## 2023-09-30 DIAGNOSIS — R60.9 WATER RETENTION: ICD-10-CM

## 2023-09-30 DIAGNOSIS — I10 ESSENTIAL HYPERTENSION: ICD-10-CM

## 2023-10-02 ENCOUNTER — OFFICE VISIT (OUTPATIENT)
Dept: INTERNAL MEDICINE | Facility: OTHER | Age: 58
End: 2023-10-02
Attending: INTERNAL MEDICINE
Payer: COMMERCIAL

## 2023-10-02 ENCOUNTER — LAB (OUTPATIENT)
Dept: INTERNAL MEDICINE | Facility: OTHER | Age: 58
End: 2023-10-02

## 2023-10-02 VITALS
RESPIRATION RATE: 16 BRPM | HEART RATE: 85 BPM | TEMPERATURE: 97.1 F | WEIGHT: 163 LBS | BODY MASS INDEX: 28.88 KG/M2 | SYSTOLIC BLOOD PRESSURE: 138 MMHG | DIASTOLIC BLOOD PRESSURE: 84 MMHG | HEIGHT: 63 IN | OXYGEN SATURATION: 95 %

## 2023-10-02 DIAGNOSIS — H92.13 EAR DRAINAGE, BILATERAL: ICD-10-CM

## 2023-10-02 DIAGNOSIS — I10 ESSENTIAL HYPERTENSION: ICD-10-CM

## 2023-10-02 DIAGNOSIS — Z82.49 FAMILY HISTORY OF ISCHEMIC HEART DISEASE: ICD-10-CM

## 2023-10-02 DIAGNOSIS — B02.30 HERPES ZOSTER WITH OPHTHALMIC COMPLICATION, UNSPECIFIED HERPES ZOSTER EYE DISEASE: ICD-10-CM

## 2023-10-02 DIAGNOSIS — R60.9 WATER RETENTION: ICD-10-CM

## 2023-10-02 DIAGNOSIS — Z12.11 SCREENING FOR COLON CANCER: ICD-10-CM

## 2023-10-02 DIAGNOSIS — F41.1 GENERALIZED ANXIETY DISORDER: Primary | ICD-10-CM

## 2023-10-02 DIAGNOSIS — R06.00 DYSPNEA, UNSPECIFIED TYPE: ICD-10-CM

## 2023-10-02 DIAGNOSIS — Z12.31 ENCOUNTER FOR SCREENING MAMMOGRAM FOR BREAST CANCER: ICD-10-CM

## 2023-10-02 PROCEDURE — 99396 PREV VISIT EST AGE 40-64: CPT | Performed by: INTERNAL MEDICINE

## 2023-10-02 RX ORDER — NEOMYCIN SULFATE, POLYMYXIN B SULFATE AND HYDROCORTISONE 10; 3.5; 1 MG/ML; MG/ML; [USP'U]/ML
4 SUSPENSION/ DROPS AURICULAR (OTIC) 3 TIMES DAILY PRN
Qty: 10 ML | Refills: 3 | Status: SHIPPED | OUTPATIENT
Start: 2023-10-02

## 2023-10-02 RX ORDER — ASPIRIN 81 MG/1
81 TABLET ORAL DAILY
COMMUNITY
Start: 2023-10-02

## 2023-10-02 RX ORDER — VALSARTAN 160 MG/1
160 TABLET ORAL EVERY EVENING
Qty: 90 TABLET | Refills: 4 | Status: SHIPPED | OUTPATIENT
Start: 2023-10-02

## 2023-10-02 RX ORDER — DULOXETIN HYDROCHLORIDE 60 MG/1
60 CAPSULE, DELAYED RELEASE ORAL DAILY
Qty: 90 CAPSULE | Refills: 4 | Status: SHIPPED | OUTPATIENT
Start: 2023-10-02

## 2023-10-02 RX ORDER — VALACYCLOVIR HYDROCHLORIDE 1 G/1
1000 TABLET, FILM COATED ORAL 2 TIMES DAILY PRN
Qty: 20 TABLET | Refills: 4 | Status: SHIPPED | OUTPATIENT
Start: 2023-10-02

## 2023-10-02 RX ORDER — HYDROCHLOROTHIAZIDE 25 MG/1
25 TABLET ORAL EVERY MORNING
Qty: 90 TABLET | Refills: 4 | Status: SHIPPED | OUTPATIENT
Start: 2023-10-02

## 2023-10-02 RX ORDER — FLUTICASONE PROPIONATE AND SALMETEROL 100; 50 UG/1; UG/1
POWDER RESPIRATORY (INHALATION)
Qty: 180 EACH | Refills: 4 | Status: SHIPPED | OUTPATIENT
Start: 2023-10-02 | End: 2023-11-29

## 2023-10-02 ASSESSMENT — ENCOUNTER SYMPTOMS
DIARRHEA: 0
SORE THROAT: 0
DIZZINESS: 0
FEVER: 0
PARESTHESIAS: 0
HEMATOCHEZIA: 0
CONSTIPATION: 0
HEARTBURN: 0
PALPITATIONS: 0
JOINT SWELLING: 0
DYSURIA: 0
ARTHRALGIAS: 0
NERVOUS/ANXIOUS: 0
BREAST MASS: 0
CHILLS: 0
NAUSEA: 0
HEMATURIA: 0
ABDOMINAL PAIN: 0
WEAKNESS: 0
SHORTNESS OF BREATH: 0
MYALGIAS: 0
FREQUENCY: 0
COUGH: 0
HEADACHES: 0
EYE PAIN: 0

## 2023-10-02 ASSESSMENT — PAIN SCALES - GENERAL: PAINLEVEL: NO PAIN (0)

## 2023-10-02 NOTE — PROGRESS NOTES
SUBJECTIVE:   CC: Juana is an 58 year old who presents for preventive health visit.       10/2/2023     9:07 AM   Additional Questions   Roomed by Juana SHRUTI       Healthy Habits:     Getting at least 3 servings of Calcium per day:  Yes    Bi-annual eye exam:  Yes    Dental care twice a year:  Yes    Sleep apnea or symptoms of sleep apnea:  None    Diet:  Low salt    Frequency of exercise:  4-5 days/week    Duration of exercise:  15-30 minutes    Taking medications regularly:  Yes    Medication side effects:  Not applicable    Additional concerns today:  No    Patient overall is doing well.  She has quit smoking.  She does need renewal of her medications.  She denies any side effects from her blood pressure medications.  Her blood pressure today is well controlled.  She reports that her breathing and ear drainage has improved since quitting smoking.    She is due for colon cancer screening.  She is due for mammogram.  Her mother recently was diagnosed with coronary disease and underwent single bypass.  She is curious if there is anything that needs to be done given her family history.    Today's PHQ-2 Score:       10/2/2023     9:00 AM   PHQ-2 (  [employer])   Q1: Little interest or pleasure in doing things 0   Q2: Feeling down, depressed or hopeless 0   PHQ-2 Score 0   Q1: Little interest or pleasure in doing things Not at all   Q2: Feeling down, depressed or hopeless Not at all   PHQ-2 Score 0         Social History     Tobacco Use    Smoking status: Former     Packs/day: 0.25     Years: 19.00     Pack years: 4.75     Types: Cigarettes     Start date: 2003     Quit date: 3/1/2023     Years since quittin.5    Smokeless tobacco: Never    Tobacco comments:     Started smoking at age 38.  Typically 1 pack a day but cutting back - working on quitting and is now down to about 5 per day.    Substance Use Topics    Alcohol use: No     Alcohol/week: 0.0 standard drinks of alcohol     Comment: Alcoholic  Drinks/day: rare           10/2/2023     9:00 AM   Alcohol Use   Prescreen: >3 drinks/day or >7 drinks/week? No     Reviewed orders with patient.  Reviewed health maintenance and updated orders accordingly - Yes      Breast Cancer Screening:  Any new diagnosis of family breast, ovarian, or bowel cancer? No    FHS-7:        No data to display                Mammogram Screening: Recommended annual mammography  Pertinent mammograms are reviewed under the imaging tab.    History of abnormal Pap smear: Status post benign hysterectomy. Health Maintenance and Surgical History updated.     Reviewed and updated as needed this visit by clinical staff   Tobacco  Allergies  Meds  Problems  Med Hx  Surg Hx  Fam Hx          Reviewed and updated as needed this visit by Provider   Tobacco  Allergies  Meds  Problems  Med Hx  Surg Hx  Fam Hx           Current Outpatient Medications   Medication    aspirin 81 MG EC tablet    DULoxetine (CYMBALTA) 60 MG capsule    fluticasone-salmeterol (ADVAIR DISKUS) 100-50 MCG/ACT inhaler    hydrochlorothiazide (HYDRODIURIL) 25 MG tablet    Misc Natural Products (LUTEIN 20) CAPS    neomycin-polymyxin-hydrocortisone (CORTISPORIN) 3.5-88662-1 otic suspension    valACYclovir (VALTREX) 1000 mg tablet    valsartan (DIOVAN) 160 MG tablet     No current facility-administered medications for this visit.     Review of Systems   Constitutional:  Negative for chills and fever.   HENT:  Negative for congestion, ear pain, hearing loss and sore throat.    Eyes:  Negative for pain and visual disturbance.   Respiratory:  Negative for cough and shortness of breath.    Cardiovascular:  Negative for chest pain, palpitations and peripheral edema.   Gastrointestinal:  Negative for abdominal pain, constipation, diarrhea, heartburn, hematochezia and nausea.   Breasts:  Negative for tenderness, breast mass and discharge.   Genitourinary:  Negative for dysuria, frequency, genital sores, hematuria, pelvic pain,  "urgency, vaginal bleeding and vaginal discharge.   Musculoskeletal:  Negative for arthralgias, joint swelling and myalgias.   Skin:  Negative for rash.   Neurological:  Negative for dizziness, weakness, headaches and paresthesias.   Psychiatric/Behavioral:  Negative for mood changes. The patient is not nervous/anxious.       OBJECTIVE:   /84 (BP Location: Right arm, Patient Position: Sitting, Cuff Size: Adult Regular)   Pulse 85   Temp 97.1  F (36.2  C) (Temporal)   Resp 16   Ht 1.588 m (5' 2.5\")   Wt 73.9 kg (163 lb)   LMP 08/07/2006 (Approximate)   SpO2 95%   BMI 29.34 kg/m    Physical Exam  GEN: Vitals reviewed. Healthy appearing. Patient is in no acute distress. Cooperative with exam.  HEENT: Normocephalic atraumatic.  Eyes grossly normal to inspection.  No discharge or erythema, or obvious scleral/conjunctival abnormalities. Oropharynx with no erythema or exudates. Dentition adequate.    NECK: Supple; no thyromegaly or masses noted.  No cervical or supraclavicular lymphadenopathy.  CV: Heart regular in rate and rhythm with no murmur.    LUNGS: No audible wheeze, cough, or visible cyanosis.  No visible retractions or increased work of breathing.  Lungs clear to auscultation bilaterally.    ABD: Nondistended  SKIN: Warm and dry to touch.  Visible skin clear. No significant rash, abnormal pigmentation or lesions.  EXT: No clubbing or cyanosis.  No peripheral edema.  NEURO: Alert and oriented to person, place, and time.  Cranial nerves II-XII grossly intact with no focal or lateralizing deficits.  Muscle tone normal.  Gait antalgic.  No tremor.   MSK: ROM of upper and lower ext symmetric and full.  PSYCH: Mood is good.  Mentation appears normal, affect normal/bright, judgement and insight intact, normal speech and appearance well-groomed.      Diagnostic Test Results:  Labs reviewed in Epic    ASSESSMENT/PLAN:   1. Generalized anxiety disorder  - Controlled.  Continue current regimen.    - DULoxetine " (CYMBALTA) 60 MG capsule; Take 1 capsule (60 mg) by mouth daily  Dispense: 90 capsule; Refill: 4    2. Essential hypertension  - Controlled.  Continue current regimen.    - hydrochlorothiazide (HYDRODIURIL) 25 MG tablet; Take 1 tablet (25 mg) by mouth every morning  Dispense: 90 tablet; Refill: 4  - valsartan (DIOVAN) 160 MG tablet; Take 1 tablet (160 mg) by mouth every evening  Dispense: 90 tablet; Refill: 4    3. Water retention  - hydrochlorothiazide (HYDRODIURIL) 25 MG tablet; Take 1 tablet (25 mg) by mouth every morning  Dispense: 90 tablet; Refill: 4    4. Ear drainage, bilateral  - Controlled.  Continue current regimen.    - neomycin-polymyxin-hydrocortisone (CORTISPORIN) 3.5-60433-9 otic suspension; Place 4 drops into both ears 3 times daily as needed (ear drainage/itching)  Dispense: 10 mL; Refill: 3    5. Herpes zoster with ophthalmic complication, unspecified herpes zoster eye disease  - Controlled.  Continue current regimen.    - valACYclovir (VALTREX) 1000 mg tablet; Take 1 tablet (1,000 mg) by mouth 2 times daily as needed (flare of shingles)  Dispense: 20 tablet; Refill: 4    6. Encounter for screening mammogram for breast cancer  - MA Screen Bilateral w/Solitario; Future    7. Screening for colon cancer  - COLOGUARD([employer]); Future    8. Dyspnea, unspecified type  - Controlled.  Continue current regimen.    - fluticasone-salmeterol (ADVAIR DISKUS) 100-50 MCG/ACT inhaler; INHALE 1 PUFF INTO THE LUNGS TWICE DAILY  Dispense: 180 each; Refill: 4    9. Family history of ischemic heart disease  At this time encouraged to pursue a healthy diet and regular exercise.  Discussed statin use and will consider this in the future.  She will continue on her aspirin per podiatry.  Could consider calcium score screening in the future.    COUNSELING:  Reviewed preventive health counseling, as reflected in patient instructions      BMI:   Estimated body mass index is 29.34 kg/m  as calculated from the following:     "Height as of this encounter: 1.588 m (5' 2.5\").    Weight as of this encounter: 73.9 kg (163 lb).   Weight management plan: Discussed healthy diet and exercise guidelines      She reports that she quit smoking about 7 months ago. Her smoking use included cigarettes. She started smoking about 20 years ago. She has a 4.75 pack-year smoking history. She has never used smokeless tobacco.  Nicotine/Tobacco Cessation Plan:   Self help information given to patient      Olya Loomis DO  [facility]        The 10-year ASCVD risk score (Lizet HALE, et al., 2019) is: 5%    Values used to calculate the score:      Age: 58 years      Sex: Female      Is Non- : No      Diabetic: No      Tobacco smoker: No      Systolic Blood Pressure: 138 mmHg      Is BP treated: Yes      HDL Cholesterol: 54 mg/dL      Total Cholesterol: 188 mg/dL    "

## 2023-10-02 NOTE — NURSING NOTE
"Chief Complaint   Patient presents with    Physical     Patient presents to the clinic today for a Physical  Initial LMP  (LMP Unknown)  Estimated body mass index is 28.59 kg/m  as calculated from the following:    Height as of 6/5/23: 1.6 m (5' 3\").    Weight as of 6/5/23: 73.2 kg (161 lb 6 oz).  Meds Reconciled: complete        FOOD SECURITY SCREENING QUESTIONS:    The next two questions are to help us understand your food security.  If you are feeling you need any assistance in this area, we have resources available to support you today.    Hunger Vital Signs:  Within the past 12 months we worried whether our food would run out before we got money to buy more. Never  Within the past 12 months the food we bought just didn't last and we didn't have money to get more. Never  Juana Baird LPN,LPN on 10/2/2023 at 9:07 AM      Juana Baird LPN  "

## 2023-10-02 NOTE — PATIENT INSTRUCTIONS
Cologuard Patient Instructions    You received an order for a Cologuard test. You will receive your kit in the mail. Please follow the instructions that are provided in the kit.      Reminder: Ship Cologuard test the same day or the next day to allow enough delivery time. The lab must receive your specimen within 4 days for successful testing. If not delivered in time, you may have to complete the process again.    Home Pick-up:  Once you complete the kit, call [facility] at [phone] and they will schedule a UPS pickup for you.    OR    Drop Off Locations:  Once you have completed the collection and have it ready to be shipped, bring it to one of the following sites listed below. *Note: none of the sites ship out later than mid-morning. Please do not drop off Fridays, as they will not go out until Monday which will make your specimen inacceptable.     - [facility] ([address], [place] [address])      Drop off: Monday-Thursday, 8:00am-4:30pm at the [address] check-in desk      - [facility] ([address], [place] [address])   Drop off: Monday-Thursday, 8:00am-5:45pm     - [facility] ([address], [place] [address])     Drop off: Monday-Thursday, 3:00pm-5:30pm

## 2023-10-04 RX ORDER — HYDROCHLOROTHIAZIDE 25 MG/1
25 TABLET ORAL EVERY MORNING
Qty: 90 TABLET | Refills: 1 | OUTPATIENT
Start: 2023-10-04

## 2023-10-04 RX ORDER — VALSARTAN 160 MG/1
TABLET ORAL
Qty: 90 TABLET | Refills: 1 | OUTPATIENT
Start: 2023-10-04

## 2023-10-04 NOTE — TELEPHONE ENCOUNTER
Medications both filled on 10/2/2023 by pcp, refusing duplicate at this time.  Unable to complete prescription refill per RN Medication Refill Policy. Irasema Chapa RN 10/4/2023 1:47 PM  '

## 2023-10-05 ENCOUNTER — TRANSFERRED RECORDS (OUTPATIENT)
Dept: HEALTH INFORMATION MANAGEMENT | Facility: OTHER | Age: 58
End: 2023-10-05
Payer: COMMERCIAL

## 2023-10-22 NOTE — PATIENT INSTRUCTIONS
Antiviral medication    Follow up with Dr. Contreras tomorrow Thursday 3/26/2020 at 9 am for check of eyes   Patient Education     Understanding Herpes Eye Disease  Herpes eye disease is a condition caused by the herpes simplex virus. It causes redness, pain, tearing, and other symptoms in the eyes. It's a common condition for people who have been exposed to the herpes virus. In severe cases, it can cause loss of eyesight.  What causes herpes eye disease?  Herpes simplex virus (HSV) type 1 most often causes herpes eye disease. HSV type 2 rarely causes eye symptoms. It more often causes genital herpes infections.  If you have herpes eye disease, it means you were infected by HSV at some point. When it happened, you likely did not have any symptoms. But once the herpes virus is in your body, it stays there for life. It is often in a dormant state. This means it does not cause any problems or symptoms. But sometimes the virus will become active and cause symptoms. This often happens in 1 set of nerves. If nerves to your eye are affected, symptoms of herpes eye disease can occur.  How herpes affects the eye  Most people infected with the herpes virus have no symptoms. Or they may have mild symptoms such as a cold sore around the mouth. But if the virus grows and becomes active, herpes eye disease can occur. Herpes eye disease can affect and damage different parts of your eye, including your:    Eyelids    The clear, strong layer on the front of your eye (cornea)    The thin layer covering the inside of your eyelids and the white part of your eye (conjunctiva)  Less commonly it can also affect:    The light-sensitive part of the back of the eye (retina)    The white part of your eye (sclera)    The colored ring on the front of your eye (iris)  Symptoms of herpes eye disease  Symptoms of herpes eye disease can include:    Eye redness    Eye pain    Tearing    Sensitivity to light    Headache    Feeling like there is  something in your eye    Rash with blisters on the eyelids    Painful sore on eyelid or eye    Vision loss  Repeated flare-ups of herpes eye disease can scar your cornea. This scarring may be long-lasting (permanent). This can lead to loss of eyesight and even blindness.  What can trigger a herpes eye disease flare-up?  You may only have a single flare-up of the virus. But the virus may become active and cause symptoms again. This is common. Certain things can trigger a flare up, such as:    Illness    Stress    Sunlight    Eye injury    Having your period    Using steroid medicine  Diagnosing herpes eye disease  Your eye care doctor (ophthalmologist) will ask about your health history and give you an eye exam. He or she may look into your eye with a slit lamp microscope. This is a device that magnifies the surface and inside of your eye. Your doctor may also put a dye into your eye. This lets him or her look at your cornea. In rare cases a tiny bit of your eye tissue may be sent to a lab. This is done to test for the virus.  Date Last Reviewed: 3/1/2018    6897-0762 [employer]. [address] [place] [address]. All rights reserved. This information is not intended as a substitute for professional medical care. Always follow your healthcare professional's instructions.           Patient Education     Shingles  Shingles is a viral infection caused by the same virus that causes chicken pox. Anyone who has had chicken pox may get shingles later in life. The virus stays in the body, but remains asleep (dormant). Shingles often occurs in older persons or persons with lowered immunity. But it can affect anyone at any age.  Shingles starts as a tingling patch of skin on one side of the body. Small, painful blisters may then appear. The rash rarely spreads to other parts of the body.  Exposure to shingles can't cause shingles. However, it can cause chicken pox in anyone who has not had chicken pox or  has not been vaccinated. The contagious period ends when all blisters have crusted over, generally 1 to 2 weeks after the illness starts.  After the blisters heal, the affected skin may be sensitive or painful for weeks or months, gradually resolving over time. But, sometimes this can last longer and be permanent (called postherpetic neuralgia.)  Shingles vaccines are available. Vaccination can help prevent shingles or make it less painful. It is generally recommended for adults older than 50, even if you've had singles in the past. Talk with your healthcare provider about when to get vaccinated and which vaccine is best for you.  Home care    Medicines may be prescribed to help relieve pain. Take these medicines as directed. Ask your healthcare provider or pharmacist before using over-the-counter medicines for helping treat pain and itching.    In certain cases, antiviral medicines may be prescribed to reduce pain, shorten the illness, and prevent neuralgia. Take these medicines as directed.    Compresses made from a solution of cool water mixed with cornstarch or baking soda may help relieve pain and itching.     Gently wash skin daily with soap and water to help prevent infection. Be certain to rinse off all of the soap, which can be irritating.    Trim fingernails and try not to scratch. Scratching the sores may leave scars.    Stay home from work or school until all blisters have formed a crust and you are no longer contagious.  Follow-up care  Follow up with your healthcare provider, or as directed.  When to seek medical advice    Fever of 100.4 F (38 C) or higher, or as directed by your healthcare provider    Affected skin is on the face or neck and any of the following occur:  ? Headache  ? Eye pain  ? Changes in vision  ? Sores near the eye  ? Weakness of facial muscles    Blisters occurring on new areas of the body    Pain, redness, or swelling of a joint    Signs of skin infection: colored drainage from  the sores, warmth, increasing redness, fever, or increasing pain  Date Last Reviewed: 4/1/2018 2000-2019 [employer]. [address] [place] [address], [place] [address]. All rights reserved. This information is not intended as a substitute for professional medical care. Always follow your healthcare professional's instructions.            General: non-toxic, NAD  HEENT: NCAT, PERRL  Cardiac: RRR, no murmurs, 2+ peripheral pulses  Chest: CTA  Abdomen: soft, non-distended, bowel sounds present, no ttp, no rebound or guarding  Extremities: no peripheral edema, calf tenderness, or leg size discrepancies  Skin: no rashes  Neuro: AAOx4, 5+motor, sensory grossly intact  Psych: mood and affect appropriate

## 2023-11-26 LAB — NONINV COLON CA DNA+OCC BLD SCRN STL QL: NEGATIVE

## 2023-11-29 ENCOUNTER — OFFICE VISIT (OUTPATIENT)
Dept: INTERNAL MEDICINE | Facility: OTHER | Age: 58
End: 2023-11-29
Attending: INTERNAL MEDICINE
Payer: COMMERCIAL

## 2023-11-29 ENCOUNTER — TRANSFERRED RECORDS (OUTPATIENT)
Dept: HEALTH INFORMATION MANAGEMENT | Facility: OTHER | Age: 58
End: 2023-11-29

## 2023-11-29 VITALS
HEIGHT: 63 IN | DIASTOLIC BLOOD PRESSURE: 98 MMHG | TEMPERATURE: 98.1 F | HEART RATE: 72 BPM | WEIGHT: 167.2 LBS | RESPIRATION RATE: 16 BRPM | OXYGEN SATURATION: 99 % | SYSTOLIC BLOOD PRESSURE: 150 MMHG | BODY MASS INDEX: 29.62 KG/M2

## 2023-11-29 DIAGNOSIS — M21.611 BILATERAL BUNIONS: ICD-10-CM

## 2023-11-29 DIAGNOSIS — I10 ESSENTIAL HYPERTENSION: ICD-10-CM

## 2023-11-29 DIAGNOSIS — M21.612 BILATERAL BUNIONS: ICD-10-CM

## 2023-11-29 DIAGNOSIS — Z01.818 PREOP GENERAL PHYSICAL EXAM: Primary | ICD-10-CM

## 2023-11-29 PROBLEM — Z13.1 SCREENING FOR DIABETES MELLITUS: Status: RESOLVED | Noted: 2021-08-08 | Resolved: 2023-11-29

## 2023-11-29 PROBLEM — Z13.29 SCREENING FOR THYROID DISORDER: Status: RESOLVED | Noted: 2021-08-08 | Resolved: 2023-11-29

## 2023-11-29 PROBLEM — Z78.0 ASYMPTOMATIC POSTMENOPAUSAL STATUS: Status: RESOLVED | Noted: 2021-08-08 | Resolved: 2023-11-29

## 2023-11-29 PROBLEM — Z63.4 DEATH OF HUSBAND: Chronic | Status: RESOLVED | Noted: 2021-08-09 | Resolved: 2023-11-29

## 2023-11-29 PROBLEM — Z13.220 SCREENING FOR HYPERLIPIDEMIA: Status: RESOLVED | Noted: 2021-08-08 | Resolved: 2023-11-29

## 2023-11-29 PROBLEM — R92.8 ABNORMAL MAMMOGRAM: Status: RESOLVED | Noted: 2021-08-08 | Resolved: 2023-11-29

## 2023-11-29 PROBLEM — Z12.11 SCREENING FOR COLON CANCER: Status: RESOLVED | Noted: 2021-08-08 | Resolved: 2023-11-29

## 2023-11-29 PROBLEM — R31.29 OTHER MICROSCOPIC HEMATURIA: Status: RESOLVED | Noted: 2021-08-09 | Resolved: 2023-11-29

## 2023-11-29 LAB
ALBUMIN SERPL BCG-MCNC: 4.4 G/DL (ref 3.5–5.2)
ANION GAP SERPL CALCULATED.3IONS-SCNC: 11 MMOL/L (ref 7–15)
BUN SERPL-MCNC: 14 MG/DL (ref 8–23)
CALCIUM SERPL-MCNC: 9.7 MG/DL (ref 8.8–10.2)
CHLORIDE SERPL-SCNC: 100 MMOL/L (ref 98–107)
CREAT SERPL-MCNC: 0.79 MG/DL (ref 0.51–0.95)
DEPRECATED HCO3 PLAS-SCNC: 27 MMOL/L (ref 22–29)
EGFRCR SERPLBLD CKD-EPI 2021: 85 ML/MIN/1.73M2
ERYTHROCYTE [DISTWIDTH] IN BLOOD BY AUTOMATED COUNT: 12.8 % (ref 10–15)
GLUCOSE SERPL-MCNC: 92 MG/DL (ref 70–99)
HCT VFR BLD AUTO: 43 % (ref 35–47)
HGB BLD-MCNC: 14.4 G/DL (ref 11.7–15.7)
MCH RBC QN AUTO: 29.3 PG (ref 26.5–33)
MCHC RBC AUTO-ENTMCNC: 33.5 G/DL (ref 31.5–36.5)
MCV RBC AUTO: 88 FL (ref 78–100)
PHOSPHATE SERPL-MCNC: 3.5 MG/DL (ref 2.5–4.5)
PLATELET # BLD AUTO: 351 10E3/UL (ref 150–450)
POTASSIUM SERPL-SCNC: 3.6 MMOL/L (ref 3.4–5.3)
RBC # BLD AUTO: 4.91 10E6/UL (ref 3.8–5.2)
SODIUM SERPL-SCNC: 138 MMOL/L (ref 135–145)
WBC # BLD AUTO: 8.3 10E3/UL (ref 4–11)

## 2023-11-29 PROCEDURE — 99214 OFFICE O/P EST MOD 30 MIN: CPT | Performed by: INTERNAL MEDICINE

## 2023-11-29 PROCEDURE — 93000 ELECTROCARDIOGRAM COMPLETE: CPT | Performed by: INTERNAL MEDICINE

## 2023-11-29 PROCEDURE — 36415 COLL VENOUS BLD VENIPUNCTURE: CPT | Mod: ZL | Performed by: INTERNAL MEDICINE

## 2023-11-29 PROCEDURE — 80069 RENAL FUNCTION PANEL: CPT | Mod: ZL | Performed by: INTERNAL MEDICINE

## 2023-11-29 PROCEDURE — 85014 HEMATOCRIT: CPT | Mod: ZL | Performed by: INTERNAL MEDICINE

## 2023-11-29 ASSESSMENT — PAIN SCALES - GENERAL: PAINLEVEL: NO PAIN (0)

## 2023-11-29 NOTE — PROGRESS NOTES
[facility] AND \[facility] [place] [facility]\""  [address]  [place] [address]  Phone: [phone]  Primary Provider: Paola Loomis  Pre-op Performing Provider: PAOLA LOOMIS    PREOPERATIVE EVALUATION:  Today's date: 11/29/2023    Juana is a 58 year old, presenting for the following:  Pre-Op Exam        11/29/2023    12:05 PM   Additional Questions   Roomed by faisal       Surgical Information:  Surgery/Procedure: bunion   Surgery Location: [facility]   Surgeon: Dr. Turner   Surgery Date: 12/14  Time of Surgery: TBD  Where patient plans to recover: At home with family  Fax number for surgical facility: [phone]    Assessment & Plan     The proposed surgical procedure is considered INTERMEDIATE risk.    Preop general physical exam  Labs are all normal/stable.  And no concerns at this time.  - CBC W PLT No Diff    Essential hypertension  Blood pressure is elevated slightly however with recent stressors this is not unexpected.  She is to monitor and let us know if she has continued high blood pressures at home.  Continue current medications.  - Renal Function Panel    Bilateral bunions  Continue with surgery as scheduled.        - No identified additional risk factors other than previously addressed    Antiplatelet or Anticoagulation Medication Instructions:   - aspirin: Per recommendations from podiatry    Additional Medication Instructions:   - ACE/ARB: Continue without modification (e.g., MAC anesthesia, neurosurgery, spine surgery, heart failure, or labile hypertension with risk of hypertension).   - Diuretics: HOLD on the day of surgery.   - Herbal medications and vitamins: HOLD 14 days prior to surgery.    RECOMMENDATION:  APPROVAL GIVEN to proceed with proposed procedure, without further diagnostic evaluation.    Subjective       HPI related to upcoming procedure:         11/29/2023    12:00 PM   Preop Questions   1. Have you ever had a heart attack or stroke? No   2. Have you ever had surgery on  your heart or blood vessels, such as a stent placement, a coronary artery bypass, or surgery on an artery in your head, neck, heart, or legs? No   3. Do you have chest pain with activity? No   4. Do you have a history of  heart failure? No   5. Do you currently have a cold, bronchitis or symptoms of other infection? No   6. Do you have a cough, shortness of breath, or wheezing? No   7. Do you or anyone in your family have previous history of blood clots? No   8. Do you or does anyone in your family have a serious bleeding problem such as prolonged bleeding following surgeries or cuts? No   9. Have you ever had problems with anemia or been told to take iron pills? No   10. Have you had any abnormal blood loss such as black, tarry or bloody stools, or abnormal vaginal bleeding? No   11. Have you ever had a blood transfusion? YES - 1990   11a. Have you ever had a transfusion reaction? No   12. Are you willing to have a blood transfusion if it is medically needed before, during, or after your surgery? Yes   13. Have you or any of your relatives ever had problems with anesthesia? No   14. Do you have sleep apnea, excessive snoring or daytime drowsiness? No   15. Do you have any artifical heart valves or other implanted medical devices like a pacemaker, defibrillator, or continuous glucose monitor? No   16. Do you have artificial joints? No   17. Are you allergic to latex? No   18. Is there any chance that you may be pregnant? No       Health Care Directive:  Patient does not have a Health Care Directive or Living Will: Discussed advance care planning with patient; information given to patient to review.    Preoperative Review of :   reviewed - no record of controlled substances prescribed.      Past Medical History:   Diagnosis Date    Acquired hallux valgus of left foot     Bilateral hallux valgus    Anxiety and depression 1995    Benign essential hypertension 12/03/2019    Chronic tension headaches 10/03/2012     Dermatitis     Dermatitis of the ear canals    Mixed hyperlipidemia 10/30/2018    Nicotine addiction 2018    Otitis externa     Shingles      Past Surgical History:   Procedure Laterality Date    BUNIONECTOMY Left 2023    Dr Turner     SECTION      2 C-Sections    COLONOSCOPY      within normal limits.    ECTOPIC PREGNANCY SURGERY      Two ectopic pregnancies; with one the left tube removed    HYSTERECTOMY TOTAL ABDOMINAL  2009    Total abdominal hysterectomy by Dr. 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 Carter     Current Outpatient Medications   Medication Sig Dispense Refill    DULoxetine (CYMBALTA) 60 MG capsule Take 1 capsule (60 mg) by mouth daily 90 capsule 4    hydrochlorothiazide (HYDRODIURIL) 25 MG tablet Take 1 tablet (25 mg) by mouth every morning 90 tablet 4    neomycin-polymyxin-hydrocortisone (CORTISPORIN) 3.5-91534-6 otic suspension Place 4 drops into both ears 3 times daily as needed (ear drainage/itching) 10 mL 3    valACYclovir (VALTREX) 1000 mg tablet Take 1 tablet (1,000 mg) by mouth 2 times daily as needed (flare of shingles) 20 tablet 4    valsartan (DIOVAN) 160 MG tablet Take 1 tablet (160 mg) by mouth every evening 90 tablet 4    aspirin 81 MG EC tablet Take 1 tablet (81 mg) by mouth daily (Patient not taking: Reported on 2023)      fluticasone-salmeterol (ADVAIR DISKUS) 100-50 MCG/ACT inhaler INHALE 1 PUFF INTO THE LUNGS TWICE DAILY (Patient not taking: Reported on 2023) 180 each 4    Misc Natural Products (LUTEIN 20) CAPS Take 1 capsule by mouth daily (Patient not taking: Reported on 2023)         Allergies   Allergen Reactions    Amoxicillin Swelling     lips    Lisinopril Swelling    Atorvastatin Other (See Comments)     Bad stomach pain, nausea    Morphine Hives     Itchy rash  at time of birth    Paroxetine Nausea        Social History     Tobacco Use    Smoking status: Former     Packs/day: 0.25     Years: 19.00     Additional pack years: 0.00     Total pack years: 4.75     Types: Cigarettes     Start date: 2003     Quit date: 3/1/2023     Years since quittin.7    Smokeless tobacco: Never    Tobacco comments:     Started smoking at age 38.  Typically 1 pack a day but cutting back - working on quitting and is now down to about 5 per day.    Substance Use Topics    Alcohol use: No     Alcohol/week: 0.0 standard drinks of alcohol     Comment: Alcoholic Drinks/day: rare     Family History   Problem Relation Age of Onset    Hypertension Mother     Heart Disease Mother     Heart Failure Mother     Cancer Father         Abernathy's polyposis syndrome with father dying early of colon cancer.   at age 41 of Abernathy's polyposis with resulting colon cancer    No Known Problems Sister     Hypertension Brother     Colon Cancer Brother         Abernathy's    Colon Cancer Brother         Abernathy's    Diabetes Maternal Grandmother         Diabetes,FHMaternal diabetes in the family    No Known Problems Daughter     No Known Problems Daughter     Other - See Comments Other 0        Family history of Abernathy's polyposis syndrome, negative genetic testing     History   Drug Use No     Comment:         Review of Systems   CONSTITUTIONAL: Negative for fever, chills, significant change in weight  INTEGUMENTARY/SKIN/LYMPH: Negative for rashes  ENT: Negative for additional ear, mouth and throat problems  RESP: Negative for significant cough or SOB  CV: Negative for chest pain, palpitations or increased peripheral edema  GI: Negative for abdominal pain, or change in bowel habits or blood in the stools/black stools  MUSCULOSKELETAL: Negative for new or changing joint pain or significant swelling  NEURO: Negative for new headaches, weakness or paraesthesias        Objective    BP (!) 150/98 (BP Location:  "Right arm, Patient Position: Sitting, Cuff Size: Adult Regular)   Pulse 72   Temp 98.1  F (36.7  C) (Tympanic)   Resp 16   Ht 1.588 m (5' 2.5\")   Wt 75.8 kg (167 lb 3.2 oz)   LMP 08/07/2006 (Approximate)   SpO2 99%   BMI 30.09 kg/m    Body mass index is 30.09 kg/m .  Physical Exam   GEN: Vitals reviewed. Healthy appearing. Patient is in no acute distress. Cooperative with exam.  HEENT: Normocephalic atraumatic.  Eyes grossly normal to inspection.  No discharge or erythema, or obvious scleral/conjunctival abnormalities.   CV: Heart regular in rate and rhythm with no murmur.    LUNGS: No audible wheeze, cough, or visible cyanosis.  No visible retractions or increased work of breathing.  Lungs clear to auscultation bilaterally.    ABD:  nondistended  SKIN: Warm and dry to touch.  Visible skin clear. No significant rash, abnormal pigmentation or lesions.  EXT: No clubbing or cyanosis.  No peripheral edema.      Recent Labs   Lab Test 06/05/23  0913 09/19/22  0815   HGB 14.2 15.0    384    138   POTASSIUM 4.1 4.4   CR 0.77 0.82   A1C  --  5.5        Diagnostics:  Recent Results (from the past 24 hour(s))   CBC W PLT No Diff    Collection Time: 11/29/23  1:04 PM   Result Value Ref Range    WBC Count 8.3 4.0 - 11.0 10e3/uL    RBC Count 4.91 3.80 - 5.20 10e6/uL    Hemoglobin 14.4 11.7 - 15.7 g/dL    Hematocrit 43.0 35.0 - 47.0 %    MCV 88 78 - 100 fL    MCH 29.3 26.5 - 33.0 pg    MCHC 33.5 31.5 - 36.5 g/dL    RDW 12.8 10.0 - 15.0 %    Platelet Count 351 150 - 450 10e3/uL   Renal Function Panel    Collection Time: 11/29/23  1:04 PM   Result Value Ref Range    Sodium 138 135 - 145 mmol/L    Potassium 3.6 3.4 - 5.3 mmol/L    Chloride 100 98 - 107 mmol/L    Carbon Dioxide (CO2) 27 22 - 29 mmol/L    Anion Gap 11 7 - 15 mmol/L    Glucose 92 70 - 99 mg/dL    Urea Nitrogen 14.0 8.0 - 23.0 mg/dL    Creatinine 0.79 0.51 - 0.95 mg/dL    GFR Estimate 85 >60 mL/min/1.73m2    Calcium 9.7 8.8 - 10.2 mg/dL    Albumin " 4.4 3.5 - 5.2 g/dL    Phosphorus 3.5 2.5 - 4.5 mg/dL      EKG: appears normal, NSR, normal axis, normal intervals, no acute ST/T changes c/w ischemia, no LVH by voltage criteria, unchanged from previous tracings    Revised Cardiac Risk Index (RCRI):  The patient has the following serious cardiovascular risks for perioperative complications:   - No serious cardiac risks = 0 points     RCRI Interpretation: 0 points: Class I (very low risk - 0.4% complication rate)         Signed Electronically by: Olya Loomis DO  Copy of this evaluation report is provided to requesting physician.

## 2023-11-29 NOTE — NURSING NOTE
"Chief Complaint   Patient presents with    Pre-Op Exam       Initial BP (!) 150/98 (BP Location: Right arm, Patient Position: Sitting, Cuff Size: Adult Regular)   Pulse 72   Temp 98.1  F (36.7  C) (Tympanic)   Resp 16   Ht 1.588 m (5' 2.5\")   Wt 75.8 kg (167 lb 3.2 oz)   LMP 08/07/2006 (Approximate)   SpO2 99%   BMI 30.09 kg/m   Estimated body mass index is 30.09 kg/m  as calculated from the following:    Height as of this encounter: 1.588 m (5' 2.5\").    Weight as of this encounter: 75.8 kg (167 lb 3.2 oz).  Medication Review: complete    The next two questions are to help us understand your food security.  If you are feeling you need any assistance in this area, we have resources available to support you today.          11/29/2023   SDOH- Food Insecurity   Within the past 12 months, did you worry that your food would run out before you got money to buy more? N   Within the past 12 months, did the food you bought just not last and you didn t have money to get more? N         Health Care Directive:  Patient does not have a Health Care Directive or Living Will: Discussed advance care planning with patient; information given to patient to review.    Madonna Heard      "

## 2023-11-29 NOTE — PATIENT INSTRUCTIONS
PREOP RECOMMENDATIONS:  -- Hold Advil/ibuprofen, Aleve/naproxen, for 2-3 days prior to surgery  -- Hold vitamins and supplements for 2 weeks prior to surgery  -- Hold your regular medication of hydrochlorothiazide the morning of surgery  -- Okay to use Tylenol (Acetaminophen)  -- No food or drink after midnight the night before surgery    Please call our office and the surgical services with any change in condition or new symptoms that develop between today and your surgical date, in particular if you have anynew chest pain, shortness of breath, swelling or fevers.

## 2023-11-30 LAB
ATRIAL RATE - MUSE: 72 BPM
DIASTOLIC BLOOD PRESSURE - MUSE: NORMAL MMHG
INTERPRETATION ECG - MUSE: NORMAL
P AXIS - MUSE: 55 DEGREES
PR INTERVAL - MUSE: 164 MS
QRS DURATION - MUSE: 92 MS
QT - MUSE: 402 MS
QTC - MUSE: 440 MS
R AXIS - MUSE: 54 DEGREES
SYSTOLIC BLOOD PRESSURE - MUSE: NORMAL MMHG
T AXIS - MUSE: 5 DEGREES
VENTRICULAR RATE- MUSE: 72 BPM

## 2023-12-21 ENCOUNTER — TRANSFERRED RECORDS (OUTPATIENT)
Dept: HEALTH INFORMATION MANAGEMENT | Facility: OTHER | Age: 58
End: 2023-12-21
Payer: COMMERCIAL

## 2023-12-27 ENCOUNTER — TRANSFERRED RECORDS (OUTPATIENT)
Dept: HEALTH INFORMATION MANAGEMENT | Facility: OTHER | Age: 58
End: 2023-12-27
Payer: COMMERCIAL

## 2024-01-24 ENCOUNTER — TRANSFERRED RECORDS (OUTPATIENT)
Dept: HEALTH INFORMATION MANAGEMENT | Facility: OTHER | Age: 59
End: 2024-01-24
Payer: COMMERCIAL

## 2024-01-25 DIAGNOSIS — Z71.6 ENCOUNTER FOR SMOKING CESSATION COUNSELING: Primary | ICD-10-CM

## 2024-01-25 NOTE — TELEPHONE ENCOUNTER
[facility] sent Rx request for the following:      Requested Prescriptions   Pending Prescriptions Disp Refills    varenicline (CHANTIX) 1 MG tablet       Sig: Take 1 tablet (1 mg) by mouth 2 times daily       Partial Cholinergic Nicotinic Agonist Agents Failed - 1/25/2024 10:49 AM        Failed - Blood pressure under 140/90 in past 12 months     BP Readings from Last 3 Encounters:   11/29/23 (!) 150/98   10/02/23 138/84   06/05/23 130/82           Failed - Medication is active on med list        Failed - Medication indicated for associated diagnosis     Medication is associated with one or more of the following diagnoses:  Smoking cessation  Tobacco use/abuse  Nicotine use/abuse  Nicotine dependency  Vaping nicotine     Last Office Visit:              11/29/23   Future Office visit:           None    Unable to complete prescription refill per RN Medication Refill Policy.     Suzie Antoine RN .............. 1/25/2024  10:51 AM

## 2024-01-26 RX ORDER — VARENICLINE TARTRATE 1 MG/1
1 TABLET, FILM COATED ORAL 2 TIMES DAILY
Qty: 180 TABLET | Refills: 2 | Status: SHIPPED | OUTPATIENT
Start: 2024-01-26 | End: 2024-04-26

## 2024-02-03 ENCOUNTER — HOSPITAL ENCOUNTER (OUTPATIENT)
Dept: GENERAL RADIOLOGY | Facility: OTHER | Age: 59
Discharge: HOME | End: 2024-02-03
Attending: NURSE PRACTITIONER
Payer: COMMERCIAL

## 2024-02-03 ENCOUNTER — OFFICE VISIT (OUTPATIENT)
Dept: FAMILY MEDICINE | Facility: OTHER | Age: 59
End: 2024-02-03
Payer: COMMERCIAL

## 2024-02-03 VITALS
TEMPERATURE: 97.1 F | DIASTOLIC BLOOD PRESSURE: 80 MMHG | BODY MASS INDEX: 28.63 KG/M2 | WEIGHT: 161.6 LBS | HEIGHT: 63 IN | SYSTOLIC BLOOD PRESSURE: 132 MMHG | OXYGEN SATURATION: 97 % | HEART RATE: 78 BPM | RESPIRATION RATE: 16 BRPM

## 2024-02-03 DIAGNOSIS — S99.921A FOOT INJURY, RIGHT, INITIAL ENCOUNTER: Primary | ICD-10-CM

## 2024-02-03 PROCEDURE — 99213 OFFICE O/P EST LOW 20 MIN: CPT | Performed by: NURSE PRACTITIONER

## 2024-02-03 PROCEDURE — 999N000065 XR FOOT RIGHT G/E 3 VIEWS: Mod: RT

## 2024-02-03 RX ORDER — ASPIRIN 325 MG
325 TABLET, DELAYED RELEASE (ENTERIC COATED) ORAL DAILY
COMMUNITY

## 2024-02-03 RX ORDER — HYDROCODONE BITARTRATE AND ACETAMINOPHEN 5; 325 MG/1; MG/1
1 TABLET ORAL EVERY 4 HOURS PRN
COMMUNITY
Start: 2023-12-14

## 2024-02-03 ASSESSMENT — PAIN SCALES - GENERAL: PAINLEVEL: MILD PAIN (3)

## 2024-02-03 NOTE — PATIENT INSTRUCTIONS
Xr confirms no screw loosening or broken hardware. This is great news. You do not need antibiotics either based on my exam today.     Reach out to Dr. Turner's office on Monday if ongoing concerns or changes present.

## 2024-02-03 NOTE — NURSING NOTE
"Chief Complaint   Patient presents with    Foot Problems     Had bunyan surgery 12/14/23; foot is red and swollen; states she was getting up and felt a pop       Initial /80   Pulse 78   Temp 97.1  F (36.2  C) (Tympanic)   Resp 16   Ht 1.588 m (5' 2.5\")   Wt 73.3 kg (161 lb 9.6 oz)   LMP 08/07/2006 (Approximate)   SpO2 97%   Breastfeeding No   BMI 29.09 kg/m   Estimated body mass index is 29.09 kg/m  as calculated from the following:    Height as of this encounter: 1.588 m (5' 2.5\").    Weight as of this encounter: 73.3 kg (161 lb 9.6 oz).  Medication Review: complete    The next two questions are to help us understand your food security.  If you are feeling you need any assistance in this area, we have resources available to support you today.          11/29/2023   SDOH- Food Insecurity   Within the past 12 months, did you worry that your food would run out before you got money to buy more? N   Within the past 12 months, did the food you bought just not last and you didn t have money to get more? N         Health Care Directive:  Patient does not have a Health Care Directive or Living Will: Discussed advance care planning with patient; however, patient declined at this time.    María Lima CMA      "

## 2024-02-03 NOTE — PROGRESS NOTES
"ASSESSMENT/PLAN:    I have reviewed the nursing notes.  I have reviewed the findings, diagnosis, plan and need for follow up with the patient.    1. Foot injury, right, initial encounter  - XR Foot Right G/E 3 Views  Xr confirmed no screw loosening. Reviewed in office. No evidence of cellulitis or any additional concerns. Recommend icing, otc medications, elevation and avoid further injury. Reassurance provided. Suspect soft tissue swelling which is mild. If ongoing concerns, follow up with Dr. Amaro   - May use over-the-counter Tylenol or ibuprofen PRN    Discussed warning signs/symptoms indicative of need to f/u    Follow up if symptoms persist or worsen or concerns    I explained my diagnostic considerations and recommendations to the patient, who voiced understanding and agreement with the treatment plan. All questions were answered. We discussed potential side effects of any prescribed or recommended therapies, as well as expectations for response to treatments.    María Meier NP  2/3/2024  4:18 PM    HPI:  Juana Farrell is a 58 year old female  who presents to [facility] today for concerns of bunion surgery on 12/14/2023. The foot is red and swollen somewhat today. No significant pain or tenderness. Did not fall. She reports she was getting up off the stool (toilet) and she felt a pop in her 1st metatarsal joint. She feels something \"sharp\" and poking and thinks she may have broken some hardware in there somehow. No fevers. Otherwise was doing well. Dr amaro did the surgery.     Past Medical History:   Diagnosis Date    Acquired hallux valgus of left foot     Bilateral hallux valgus    Anxiety and depression 1995    Benign essential hypertension 12/03/2019    Chronic tension headaches 10/03/2012    Dermatitis     Dermatitis of the ear canals    Mixed hyperlipidemia 10/30/2018    Nicotine addiction 01/18/2018    Otitis externa     Shingles 2021     Past Surgical History:   Procedure " Laterality Date    BUNIONECTOMY Left 2023    Dr Turner     SECTION      2 C-Sections    COLONOSCOPY      within normal limits.    ECTOPIC PREGNANCY SURGERY      Two ectopic pregnancies; with one the left tube removed    HYSTERECTOMY TOTAL ABDOMINAL  2009    Total abdominal hysterectomy by Dr. 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 Carter     Social History     Tobacco Use    Smoking status: Former     Packs/day: 0.25     Years: 19.00     Additional pack years: 0.00     Total pack years: 4.75     Types: Cigarettes     Start date: 2003     Quit date: 3/1/2023     Years since quittin.9    Smokeless tobacco: Never    Tobacco comments:     Started smoking at age 38.  Typically 1 pack a day but cutting back - working on quitting and is now down to about 5 per day.    Substance Use Topics    Alcohol use: No     Alcohol/week: 0.0 standard drinks of alcohol     Comment: Alcoholic Drinks/day: rare     Current Outpatient Medications   Medication Sig Dispense Refill    aspirin (ASA) 325 MG EC tablet Take 325 mg by mouth daily      DULoxetine (CYMBALTA) 60 MG capsule Take 1 capsule (60 mg) by mouth daily 90 capsule 4    hydrochlorothiazide (HYDRODIURIL) 25 MG tablet Take 1 tablet (25 mg) by mouth every morning 90 tablet 4    valsartan (DIOVAN) 160 MG tablet Take 1 tablet (160 mg) by mouth every evening 90 tablet 4    aspirin 81 MG EC tablet Take 1 tablet (81 mg) by mouth daily (Patient not taking: Reported on 2023)      HYDROcodone-acetaminophen (NORCO) 5-325 MG tablet Take 1 tablet by mouth every 4 hours as needed for pain (Patient not taking: Reported on 2/3/2024)      neomycin-polymyxin-hydrocortisone (CORTISPORIN) 3.5-53122-5 otic suspension Place 4 drops into both ears 3 times daily as needed (ear drainage/itching) (Patient not  "taking: Reported on 2/3/2024) 10 mL 3    valACYclovir (VALTREX) 1000 mg tablet Take 1 tablet (1,000 mg) by mouth 2 times daily as needed (flare of shingles) (Patient not taking: Reported on 2/3/2024) 20 tablet 4    varenicline (CHANTIX) 1 MG tablet Take 1 tablet (1 mg) by mouth 2 times daily (Patient not taking: Reported on 2/3/2024) 180 tablet 2     Allergies   Allergen Reactions    Amoxicillin Swelling     lips    Lisinopril Swelling    Atorvastatin Other (See Comments)     Bad stomach pain, nausea    Morphine Hives     Itchy rash at time of birth    Paroxetine Nausea     Past medical history, past surgical history, current medications and allergies reviewed and accurate to the best of my knowledge.      ROS:  Refer to HPI    /80   Pulse 78   Temp 97.1  F (36.2  C) (Tympanic)   Resp 16   Ht 1.588 m (5' 2.5\")   Wt 73.3 kg (161 lb 9.6 oz)   LMP 08/07/2006 (Approximate)   SpO2 97%   Breastfeeding No   BMI 29.09 kg/m      EXAM:  General Appearance: Well appearing 58 year old female, appropriate appearance for age. No acute distress   Respiratory: No increased work of breathing.  No cough appreciated.  Musculoskeletal:  Equal movement of bilateral upper extremities.  Equal movement of bilateral lower extremities.  Normal gait.    Right FOOT and ankle PHYSICAL EXAMINATION:  Inspection: no signs of edema, bony deformity or skin abnormalities noted  1st metatarsal with healed incision post op-   Mild swelling to the great toe. No broken skin. No erythema. No warmth.     Palpation: non tender foot and ankle    ROM: plantarflexion full, dorsiflexion full, inversion full, eversion full.      Neurovascular Exam:   Pulses: Dorsalis pedis and Posterior tibial pulse 2+   Capillary refill intact < 3 seconds   Sensation intact, patient able to wiggle/move all toes    Psychological: normal affect, alert, oriented, and pleasant.     Results for orders placed or performed in visit on 02/03/24   XR Foot Right G/E 3 " Views     Status: None    Narrative    PROCEDURE:  XR FOOT RIGHT G/E 3 VIEWS    HISTORY: foot injury following recent bunion surgery; r/o broken  hardware?; Foot injury, right, initial encounter    COMPARISON:  None.    TECHNIQUE:  3 views of the right foot were obtained.    FINDINGS:  There is been an arthrodesis of the first metatarsal  phalangeal joint fixed with a plate and multiple screws. No screw  loosening is seen. No acute bony fractures or destructive lesions are  noted.       Impression    IMPRESSION: Post arthrodesis of the first metatarsal phalangeal joint.  No acute abnormality      SCARLETT DILL MD         SYSTEM ID:  [id number]

## 2024-02-12 ENCOUNTER — TRANSFERRED RECORDS (OUTPATIENT)
Dept: HEALTH INFORMATION MANAGEMENT | Facility: OTHER | Age: 59
End: 2024-02-12
Payer: COMMERCIAL

## 2024-04-26 DIAGNOSIS — Z71.6 ENCOUNTER FOR SMOKING CESSATION COUNSELING: ICD-10-CM

## 2024-04-26 RX ORDER — VARENICLINE TARTRATE 1 MG/1
1 TABLET, FILM COATED ORAL 2 TIMES DAILY
Qty: 180 TABLET | Refills: 0 | Status: SHIPPED | OUTPATIENT
Start: 2024-04-26

## 2024-04-26 NOTE — TELEPHONE ENCOUNTER
Spoke to patient and scheduled annual physical for October.    Jenny Mckeon on 4/26/2024 at 11:30 AM

## 2024-04-26 NOTE — TELEPHONE ENCOUNTER
Pending Prescriptions:                       Disp   Refills    varenicline (CHANTIX) 1 MG tablet         180 ta*0            Sig: Take 1 tablet (1 mg) by mouth 2 times daily    Marked as not taking on medication list. Patient states she is going through a hard time right now and wants a cigarette. She would like a refill.     María Lima CMA on 4/26/2024 at 10:35 AM     Attending Attestation (For Attendings USE Only)...

## 2024-06-17 ENCOUNTER — OFFICE VISIT (OUTPATIENT)
Dept: ORTHOPEDICS | Facility: OTHER | Age: 59
End: 2024-06-17
Attending: ORTHOPAEDIC SURGERY
Payer: COMMERCIAL

## 2024-06-17 DIAGNOSIS — G89.29 CHRONIC RIGHT SHOULDER PAIN: Primary | ICD-10-CM

## 2024-06-17 DIAGNOSIS — M25.511 CHRONIC RIGHT SHOULDER PAIN: Primary | ICD-10-CM

## 2024-06-17 PROCEDURE — 250N000011 HC RX IP 250 OP 636: Performed by: ORTHOPAEDIC SURGERY

## 2024-06-17 PROCEDURE — 250N000009 HC RX 250: Performed by: ORTHOPAEDIC SURGERY

## 2024-06-17 PROCEDURE — 20610 DRAIN/INJ JOINT/BURSA W/O US: CPT | Mod: RT | Performed by: ORTHOPAEDIC SURGERY

## 2024-06-17 RX ORDER — LIDOCAINE HYDROCHLORIDE 10 MG/ML
0.5 INJECTION, SOLUTION EPIDURAL; INFILTRATION; INTRACAUDAL; PERINEURAL ONCE
Status: COMPLETED | OUTPATIENT
Start: 2024-06-17 | End: 2024-06-17

## 2024-06-17 RX ORDER — TRIAMCINOLONE ACETONIDE 40 MG/ML
20 INJECTION, SUSPENSION INTRA-ARTICULAR; INTRAMUSCULAR ONCE
Status: COMPLETED | OUTPATIENT
Start: 2024-06-17 | End: 2024-06-17

## 2024-06-17 RX ADMIN — TRIAMCINOLONE ACETONIDE 20 MG: 40 INJECTION, SUSPENSION INTRA-ARTICULAR; INTRAMUSCULAR at 13:39

## 2024-06-17 RX ADMIN — LIDOCAINE HYDROCHLORIDE 0.5 ML: 10 INJECTION, SOLUTION EPIDURAL; INFILTRATION; INTRACAUDAL; PERINEURAL at 13:40

## 2024-06-17 NOTE — PROGRESS NOTES
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  Last injection was 4/3/2023.  She comes in today asking for repeat injection as her pain has started up again.    Objective:    On examination she is mildly tender to palpation at the right AC joint.  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  She had good relief of her pain.  Will see her back on an as-needed basis.    A steroid injection was performed at right AC joint using 1% plain Lidocaine and 20 mg of Kenalog. This was well tolerated.

## 2024-07-22 ENCOUNTER — TRANSFERRED RECORDS (OUTPATIENT)
Dept: HEALTH INFORMATION MANAGEMENT | Facility: OTHER | Age: 59
End: 2024-07-22
Payer: COMMERCIAL

## 2024-08-30 ENCOUNTER — MYC MEDICAL ADVICE (OUTPATIENT)
Dept: INTERNAL MEDICINE | Facility: OTHER | Age: 59
End: 2024-08-30
Payer: COMMERCIAL

## 2024-08-30 NOTE — TELEPHONE ENCOUNTER
Betsy prado'd the use of Ashley Jesus's F/U spot on 9/3 for this appt.      Called [facility] to schedule.    Followed up on MyChart.     Bee Haq RN on 8/30/2024 at 1:31 PM

## 2024-09-03 ENCOUNTER — OFFICE VISIT (OUTPATIENT)
Dept: INTERNAL MEDICINE | Facility: OTHER | Age: 59
End: 2024-09-03
Payer: COMMERCIAL

## 2024-09-03 VITALS
OXYGEN SATURATION: 96 % | RESPIRATION RATE: 18 BRPM | DIASTOLIC BLOOD PRESSURE: 92 MMHG | TEMPERATURE: 98.4 F | BODY MASS INDEX: 27.18 KG/M2 | HEIGHT: 64 IN | SYSTOLIC BLOOD PRESSURE: 138 MMHG | WEIGHT: 159.2 LBS | HEART RATE: 96 BPM

## 2024-09-03 DIAGNOSIS — H69.93 DYSFUNCTION OF BOTH EUSTACHIAN TUBES: ICD-10-CM

## 2024-09-03 DIAGNOSIS — H60.393 INFECTIVE OTITIS EXTERNA, BILATERAL: Primary | ICD-10-CM

## 2024-09-03 DIAGNOSIS — J01.90 ACUTE SINUSITIS WITH SYMPTOMS > 10 DAYS: ICD-10-CM

## 2024-09-03 PROCEDURE — 99214 OFFICE O/P EST MOD 30 MIN: CPT

## 2024-09-03 PROCEDURE — G2211 COMPLEX E/M VISIT ADD ON: HCPCS

## 2024-09-03 RX ORDER — FLUCONAZOLE 150 MG/1
150 TABLET ORAL
Qty: 3 TABLET | Refills: 0 | Status: SHIPPED | OUTPATIENT
Start: 2024-09-03 | End: 2024-09-10

## 2024-09-03 RX ORDER — OFLOXACIN 3 MG/ML
5 SOLUTION AURICULAR (OTIC) 2 TIMES DAILY
Qty: 10 ML | Refills: 1 | Status: SHIPPED | OUTPATIENT
Start: 2024-09-03

## 2024-09-03 RX ORDER — DOXYCYCLINE 100 MG/1
100 CAPSULE ORAL 2 TIMES DAILY
Qty: 14 CAPSULE | Refills: 0 | Status: SHIPPED | OUTPATIENT
Start: 2024-09-03 | End: 2024-09-10

## 2024-09-03 ASSESSMENT — ENCOUNTER SYMPTOMS
TROUBLE SWALLOWING: 0
RHINORRHEA: 0
SORE THROAT: 0
EYE PAIN: 1
FACIAL SWELLING: 0
SINUS PRESSURE: 1
VOICE CHANGE: 0
SINUS PAIN: 0

## 2024-09-03 ASSESSMENT — VISUAL ACUITY: OU: 1

## 2024-09-03 ASSESSMENT — PAIN SCALES - GENERAL: PAINLEVEL: MILD PAIN (2)

## 2024-09-03 NOTE — PATIENT INSTRUCTIONS
We will treat a bilateral outer ear infection with eardrops and a suspected sinus infection with doxycycline twice daily for 7 days-- Diflucan if you develop a secondary yeast infection    - Please contact me if after 7-10 days symptoms are not improving and we will proceed with head imaging.       Start doxycycline 100 mg tablet -- take twice daily (AM and PM) after meals.   Avoid dairy products within 1 to 2 hours (before or after) taking antibiotic -- as it will grab the antibiotic and not let it work.   -- You can eat whatever you want for lunch.   -- Take calcium or magnesium or multivitamin supplements at lunchtime while taking doxycycline.  -Avoid sunlight- wear sunscreen and hat while on this medication- it increases your likelihood of severe sunburn

## 2024-09-03 NOTE — PROGRESS NOTES
"  Assessment & Plan     ICD-10-CM    1. Infective otitis externa, bilateral  H60.393 fluconazole (DIFLUCAN) 150 MG tablet     doxycycline hyclate (VIBRAMYCIN) 100 MG capsule     ofloxacin (FLOXIN) 0.3 % otic solution      2. Acute sinusitis with symptoms > 10 days  J01.90 fluconazole (DIFLUCAN) 150 MG tablet     doxycycline hyclate (VIBRAMYCIN) 100 MG capsule      3. Dysfunction of both eustachian tubes  H69.93 fluticasone furoate 27.5 MCG/SPRAY nasal spray         Suspect patient's symptoms are related to otitis externa/eustachian tube dysfunction/possible acute sinusitis. She does have bilateral copious amounts of purulent fluid in her ear canals. We will trial treating with ofloxacin eardrops, 7 days of doxycycline, Flonase.  She states that after antibiotics she gets a yeast infection.  I sent in Diflucan for her to start if she develops symptoms.  Discussed other differential diagnosis causing her symptoms.  Instructed her to keep her ophthalmology appointment to rule out potential ocular cause. If she has a normal eye exam and continues to have symptoms despite ruling out possible sinusitis with antibiotics we will proceed with MRI imaging to rule out lesion/brain tumor causing her symptoms.  Instructed patient to return sooner if she develops new or worsening symptoms.  Patient verbalized understanding and agreed to the treatment plan.           BMI  Estimated body mass index is 27.76 kg/m  as calculated from the following:    Height as of this encounter: 1.613 m (5' 3.5\").    Weight as of this encounter: 72.2 kg (159 lb 3.2 oz).           No follow-ups on file.      CYRUS Rangel [facility]    Review of Systems   HENT:  Positive for ear discharge and sinus pressure (right sided-slight). Negative for congestion, dental problem, ear pain, facial swelling, rhinorrhea, sinus pain, sore throat, tinnitus, trouble swallowing and voice change.    Eyes:  Positive for pain (\"slight " "pressure\") and visual disturbance.   All other systems reviewed and are negative.          Subjective   Juana is a 59 year old, presenting for the following health issues:  Eye Problem    Patient presents to clinic with concerns of slight vertigo unrelated to positional changes, mild pressure behind her right eye, daughter reporting that she visualized her right eye veering off abnormally. She has had ongoing vague symptoms for several months, more noticeable over the last week.  Patient states that she had her vision checked in February which was normal.  She does have an ophthalmology appointment scheduled on 9/9/2024.  She denies any headaches or any new neurological changes/symptoms.  She does have a history of frequent otitis externa, sinus difficulties in the past.  She has not been taking any medication for symptoms.  Denies any fevers or chills.    History of Present Illness       Reason for visit:  Right eye  Symptom onset:  1-2 weeks ago  Symptoms include:  Wondering eye and pressure  Symptom intensity:  Moderate  Symptom progression:  Staying the same  Had these symptoms before:  No  What makes it worse:  Ni  What makes it better:  No She is missing 1 dose(s) of medications per week.                     Objective    BP (!) 138/92   Pulse 96   Temp 98.4  F (36.9  C)   Resp 18   Ht 1.613 m (5' 3.5\")   Wt 72.2 kg (159 lb 3.2 oz)   LMP 08/07/2006 (Approximate)   SpO2 96%   Breastfeeding No   BMI 27.76 kg/m    Body mass index is 27.76 kg/m .  Physical Exam  Vitals reviewed.   Constitutional:       General: She is not in acute distress.     Appearance: She is well-developed.   HENT:      Head: Normocephalic and atraumatic.      Right Ear: Drainage present. Tympanic membrane is not erythematous.      Left Ear: Drainage present. Tympanic membrane is not erythematous.      Nose: Nose normal.      Mouth/Throat:      Pharynx: No oropharyngeal exudate.   Eyes:      General: Lids are normal. Vision grossly " intact. Gaze aligned appropriately. No scleral icterus.        Right eye: No discharge.         Left eye: No discharge.      Extraocular Movements: Extraocular movements intact.      Right eye: Normal extraocular motion and no nystagmus.      Left eye: Normal extraocular motion and no nystagmus.      Conjunctiva/sclera: Conjunctivae normal.      Pupils: Pupils are equal, round, and reactive to light.   Neck:      Thyroid: No thyromegaly.   Cardiovascular:      Rate and Rhythm: Normal rate and regular rhythm.   Pulmonary:      Effort: Pulmonary effort is normal.   Musculoskeletal:         General: No tenderness or deformity. Normal range of motion.      Cervical back: Normal range of motion and neck supple.   Skin:     General: Skin is warm and dry.      Findings: No rash.   Neurological:      Mental Status: She is alert and oriented to person, place, and time.      Cranial Nerves: No cranial nerve deficit.      Coordination: Coordination normal.   Psychiatric:         Behavior: Behavior normal.         Thought Content: Thought content normal.         Judgment: Judgment normal.                  The longitudinal plan of care for the diagnosis(es)/condition(s) as documented were addressed during this visit. Due to the added complexity in care, I will continue to support Juana in the subsequent management and with ongoing continuity of care.   Signed Electronically by: CYRUS Rangel CNP

## 2024-09-03 NOTE — NURSING NOTE
"Chief Complaint   Patient presents with    Eye Problem       Initial BP (!) 138/92   Pulse 96   Temp 98.4  F (36.9  C)   Resp 18   Ht 1.613 m (5' 3.5\")   Wt 72.2 kg (159 lb 3.2 oz)   LMP 08/07/2006 (Approximate)   SpO2 96%   Breastfeeding No   BMI 27.76 kg/m   Estimated body mass index is 27.76 kg/m  as calculated from the following:    Height as of this encounter: 1.613 m (5' 3.5\").    Weight as of this encounter: 72.2 kg (159 lb 3.2 oz).  Medication Review: complete    The next two questions are to help us understand your food security.  If you are feeling you need any assistance in this area, we have resources available to support you today.          11/29/2023   SDOH- Food Insecurity   Within the past 12 months, did you worry that your food would run out before you got money to buy more? N   Within the past 12 months, did the food you bought just not last and you didn t have money to get more? N            Health Care Directive:  Patient does not have a Health Care Directive or Living Will: Discussed advance care planning with patient; however, patient declined at this time.    Elisabet Cheng LPN      "

## 2024-09-10 ENCOUNTER — TRANSFERRED RECORDS (OUTPATIENT)
Dept: HEALTH INFORMATION MANAGEMENT | Facility: OTHER | Age: 59
End: 2024-09-10
Payer: COMMERCIAL

## 2024-10-02 ENCOUNTER — MYC MEDICAL ADVICE (OUTPATIENT)
Dept: INTERNAL MEDICINE | Facility: OTHER | Age: 59
End: 2024-10-02
Payer: COMMERCIAL

## 2024-10-02 DIAGNOSIS — H60.393 INFECTIVE OTITIS EXTERNA, BILATERAL: Primary | ICD-10-CM

## 2024-10-02 DIAGNOSIS — H69.93 DYSFUNCTION OF BOTH EUSTACHIAN TUBES: ICD-10-CM

## 2024-10-02 DIAGNOSIS — J01.90 ACUTE SINUSITIS WITH SYMPTOMS > 10 DAYS: ICD-10-CM

## 2024-10-02 NOTE — TELEPHONE ENCOUNTER
Pt is wanting MRI r/t eye issues. Eye exam was normal. Abx worked for a little bit but now eye is worsening.     Per OV from 9/3/24:  If she has a normal eye exam and continues to have symptoms despite ruling out possible sinusitis with antibiotics we will proceed with MRI imaging. Instructed patient to return sooner if she develops new or worsening symptoms.     Stevie'd up MRI     Routing to provider to review and respond.  Sonam Lin RN on 10/2/2024 at 4:02 PM

## 2024-10-09 NOTE — TELEPHONE ENCOUNTER
Called Yolanda at [facility] and left VM to let her know that Ashley Jesus's note has been addended and to try to re-submit second MRI to insurance for coverage of both MRI's.    Number left for questions.        Bee Haq RN on 10/9/2024 at 10:52 AM

## 2024-10-09 NOTE — TELEPHONE ENCOUNTER
Ashley,     Two MRI's were ordered.  Only the MRI of orbits was approved (not the whole brain) based on diagnosis.  Should she schedule the MRI of orbits at this time or would you like to push to get both approved?      This would include either:    Addeneding your OV note to state the need- looking for lesion/tumor on brain  Setting up a [id number] review for you to speak with someone about the need.     Let me know and I will pass on message to Sachin.     ___________________________________________________________________    Two MRI's ordered.     One was brain with and without contrast (pituitaries).   Not approved  (based on diagnosis)    One was MRI of orbits (sinuses, eustachian tubes).  Approved.      Is she ok with just the one MRI, and cancel the other one- she can schedule the orbit MRI.  If something shows on the orbits one, they can order the additional brain MRI.      Unless they are looking for a tumor or lesion- the brain MRI likely will not be approved.     OK to proceed with just scheduling MRI of orbits?      If wanting both- will need to do a [id number] review (Yolanda can set this up) OR she can addend office visit notes and they can re-submit.      Bee Haq RN on 10/9/2024 at 8:53 AM

## 2024-10-16 DIAGNOSIS — F41.1 GENERALIZED ANXIETY DISORDER: ICD-10-CM

## 2024-10-16 DIAGNOSIS — I10 ESSENTIAL HYPERTENSION: ICD-10-CM

## 2024-10-16 DIAGNOSIS — R60.9 WATER RETENTION: ICD-10-CM

## 2024-10-21 RX ORDER — DULOXETIN HYDROCHLORIDE 60 MG/1
60 CAPSULE, DELAYED RELEASE ORAL DAILY
Qty: 90 CAPSULE | Refills: 4 | Status: SHIPPED | OUTPATIENT
Start: 2024-10-21

## 2024-10-21 RX ORDER — HYDROCHLOROTHIAZIDE 25 MG/1
25 TABLET ORAL EVERY MORNING
Qty: 90 TABLET | Refills: 4 | Status: SHIPPED | OUTPATIENT
Start: 2024-10-21

## 2024-10-21 RX ORDER — VALSARTAN 160 MG/1
TABLET ORAL
Qty: 90 TABLET | Refills: 4 | Status: SHIPPED | OUTPATIENT
Start: 2024-10-21

## 2024-10-21 NOTE — TELEPHONE ENCOUNTER
[facility] sent Rx request for the following:      Requested Prescriptions   Pending Prescriptions Disp Refills    DULoxetine (CYMBALTA) 60 MG capsule [Pharmacy Med Name: DULOXETINE DR 60MG CAPSULES] 90 capsule 4     Sig: TAKE 1 CAPSULE(60 MG) BY MOUTH DAILY       Serotonin-Norepinephrine Reuptake Inhibitors  Failed - 10/21/2024  4:12 PM        Failed - Most recent blood pressure under 140/90 in past 12 months     BP Readings from Last 3 Encounters:   09/03/24 (!) 138/92   02/03/24 132/80   11/29/23 (!) 150/98       No data recorded            Failed - CARMINA-7 score on file during last 12 months        Passed - Medication is active on med list        Passed - Recent (12 mo) or future (90 days) visit within the authorizing provider's specialty     The patient must have completed an in-person or virtual visit within the past 12 months or has a future visit scheduled within the next 90 days with the authorizing provider s specialty.  Urgent care and e-visits do not quality as an office visit for this protocol.          Passed - Medication indicated for associated diagnosis     Medication is associated with one or more of the following diagnoses:  Anxiety  Bipolar  Chronic musculoskeletal pain  Depression  Fibromyalgia  Headache  Migraine  Neuropathy  Obsessive compulsive disorder  Panic disorder  Social phobia  Mood disorder  Menopause  Hot flashes/Menopausal flushing  Fibromyitis          Passed - Patient is age 18 or older        Passed - No active pregnancy on record        Passed - No positive pregnancy test in past 12 months      Last Prescription Date:   10/2/23  Last Fill Qty/Refills:         90, R-4            hydrochlorothiazide (HYDRODIURIL) 25 MG tablet [Pharmacy Med Name: HYDROCHLOROTHIAZIDE 25MG TABLETS] 90 tablet 4     Sig: TAKE 1 TABLET(25 MG) BY MOUTH EVERY MORNING       Diuretics (Including Combos) Protocol Failed - 10/21/2024  4:12 PM        Failed - Most recent blood pressure under 140/90 in  past 12 months     BP Readings from Last 3 Encounters:   09/03/24 (!) 138/92   02/03/24 132/80   11/29/23 (!) 150/98       No data recorded            Passed - Medication is active on med list        Passed - Medication indicated for associated diagnosis     Medication is associated with one or more of the following diagnoses:     Edema   Hypertension   Heart Failure   Meniere's Disease   Bilateral localized swelling of lower limbs   Pulmonary Hypertension          Passed - Has GFR on file in past 12 months and most recent value is normal        Passed - Recent (12 mo) or future (90 days) visit within the authorizing provider's specialty     The patient must have completed an in-person or virtual visit within the past 12 months or has a future visit scheduled within the next 90 days with the authorizing provider s specialty.  Urgent care and e-visits do not quality as an office visit for this protocol.          Passed - Patient is age 18 or older        Passed - No active pregancy on record        Passed - No positive pregnancy test in past 12 months      Last Prescription Date:   10/2/23  Last Fill Qty/Refills:         90, R-4            valsartan (DIOVAN) 160 MG tablet [Pharmacy Med Name: VALSARTAN 160MG TABLETS] 90 tablet 4     Sig: TAKE 1 TABLET(160 MG) BY MOUTH EVERY EVENING       Angiotensin-II Receptors Failed - 10/21/2024  4:12 PM        Failed - Most recent blood pressure under 140/90 in past 12 months     BP Readings from Last 3 Encounters:   09/03/24 (!) 138/92   02/03/24 132/80   11/29/23 (!) 150/98       No data recorded            Passed - Medication is active on med list        Passed - Has GFR on file in past 12 months and most recent value is normal        Passed - Medication indicated for associated diagnosis     The medication is prescribed for one or more of the following conditions:    Chronic Kidney Disease (CDK)    Heart Failure (HF)    Diabetes, Nephropathy   Hypertension    Coronary Artery  Disease (CAD)   Raynaud's Disease   Ischemic cardiomyopathy   Cardiomyopathy   Pulmonary Hypertension          Passed - Recent (12 mo) or future (90days) visit within the authorizing provider's specialty     The patient must have completed an in-person or virtual visit within the past 12 months or has a future visit scheduled within the next 90 days with the authorizing provider s specialty.  Urgent care and e-visits do not quality as an office visit for this protocol.          Passed - Patient is age 18 or older        Passed - No active pregnancy on record        Passed - Normal serum potassium on file in past 12 months     Recent Labs   Lab Test 11/29/23  1304   POTASSIUM 3.6                    Passed - No positive pregnancy test in past 12 months             Last Prescription Date:   10/2/23  Last Fill Qty/Refills:         90, R-4    Last Office Visit:              11/29/23 (preop - [facility])   Future Office visit:             Next 5 appointments (look out 90 days)      Oct 24, 2024 8:40 AM  (Arrive by 8:25 AM)  PHYSICAL with Olya Loomis DO  [facility] ([facility] ) [address]  [place] [address]  [phone]           Unable to complete prescription refill per RN Medication Refill Policy.     Subhash Bella RN on 10/21/2024 at 4:15 PM

## 2024-10-24 ENCOUNTER — OFFICE VISIT (OUTPATIENT)
Dept: INTERNAL MEDICINE | Facility: OTHER | Age: 59
End: 2024-10-24
Attending: INTERNAL MEDICINE
Payer: COMMERCIAL

## 2024-10-24 VITALS
BODY MASS INDEX: 28.63 KG/M2 | OXYGEN SATURATION: 94 % | HEART RATE: 80 BPM | WEIGHT: 161.6 LBS | HEIGHT: 63 IN | SYSTOLIC BLOOD PRESSURE: 136 MMHG | DIASTOLIC BLOOD PRESSURE: 88 MMHG | RESPIRATION RATE: 16 BRPM

## 2024-10-24 DIAGNOSIS — Z71.6 ENCOUNTER FOR SMOKING CESSATION COUNSELING: ICD-10-CM

## 2024-10-24 DIAGNOSIS — J34.89 SINUS PRESSURE: ICD-10-CM

## 2024-10-24 DIAGNOSIS — Z13.220 SCREENING FOR HYPERLIPIDEMIA: ICD-10-CM

## 2024-10-24 DIAGNOSIS — I10 ESSENTIAL HYPERTENSION: ICD-10-CM

## 2024-10-24 DIAGNOSIS — B02.30 HERPES ZOSTER WITH OPHTHALMIC COMPLICATION, UNSPECIFIED HERPES ZOSTER EYE DISEASE: ICD-10-CM

## 2024-10-24 DIAGNOSIS — H53.2 DOUBLE VISION: ICD-10-CM

## 2024-10-24 DIAGNOSIS — H53.9 VISION CHANGES: Primary | ICD-10-CM

## 2024-10-24 DIAGNOSIS — Z13.1 SCREENING FOR DIABETES MELLITUS: ICD-10-CM

## 2024-10-24 DIAGNOSIS — Z12.31 ENCOUNTER FOR SCREENING MAMMOGRAM FOR BREAST CANCER: ICD-10-CM

## 2024-10-24 LAB
ALBUMIN SERPL BCG-MCNC: 4.2 G/DL (ref 3.5–5.2)
ALP SERPL-CCNC: 108 U/L (ref 40–150)
ALT SERPL W P-5'-P-CCNC: 23 U/L (ref 0–50)
ANION GAP SERPL CALCULATED.3IONS-SCNC: 8 MMOL/L (ref 7–15)
AST SERPL W P-5'-P-CCNC: 22 U/L (ref 0–45)
BILIRUB SERPL-MCNC: 0.4 MG/DL
BUN SERPL-MCNC: 13 MG/DL (ref 8–23)
CALCIUM SERPL-MCNC: 9.1 MG/DL (ref 8.8–10.4)
CHLORIDE SERPL-SCNC: 102 MMOL/L (ref 98–107)
CHOLEST SERPL-MCNC: 209 MG/DL
CREAT SERPL-MCNC: 0.93 MG/DL (ref 0.51–0.95)
EGFRCR SERPLBLD CKD-EPI 2021: 70 ML/MIN/1.73M2
ERYTHROCYTE [DISTWIDTH] IN BLOOD BY AUTOMATED COUNT: 13.1 % (ref 10–15)
EST. AVERAGE GLUCOSE BLD GHB EST-MCNC: 108 MG/DL
FASTING STATUS PATIENT QL REPORTED: YES
FASTING STATUS PATIENT QL REPORTED: YES
FERRITIN SERPL-MCNC: 114 NG/ML (ref 11–328)
GLUCOSE SERPL-MCNC: 85 MG/DL (ref 70–99)
HBA1C MFR BLD: 5.4 %
HCO3 SERPL-SCNC: 29 MMOL/L (ref 22–29)
HCT VFR BLD AUTO: 41.8 % (ref 35–47)
HDLC SERPL-MCNC: 47 MG/DL
HGB BLD-MCNC: 13.8 G/DL (ref 11.7–15.7)
LDLC SERPL CALC-MCNC: 127 MG/DL
MAGNESIUM SERPL-MCNC: 2.3 MG/DL (ref 1.7–2.3)
MCH RBC QN AUTO: 29.3 PG (ref 26.5–33)
MCHC RBC AUTO-ENTMCNC: 33 G/DL (ref 31.5–36.5)
MCV RBC AUTO: 89 FL (ref 78–100)
NONHDLC SERPL-MCNC: 162 MG/DL
PLATELET # BLD AUTO: 355 10E3/UL (ref 150–450)
POTASSIUM SERPL-SCNC: 3.9 MMOL/L (ref 3.4–5.3)
PROT SERPL-MCNC: 7.3 G/DL (ref 6.4–8.3)
RBC # BLD AUTO: 4.71 10E6/UL (ref 3.8–5.2)
SODIUM SERPL-SCNC: 139 MMOL/L (ref 135–145)
TRIGL SERPL-MCNC: 176 MG/DL
TSH SERPL DL<=0.005 MIU/L-ACNC: 2.29 UIU/ML (ref 0.3–4.2)
WBC # BLD AUTO: 6.3 10E3/UL (ref 4–11)

## 2024-10-24 PROCEDURE — 84443 ASSAY THYROID STIM HORMONE: CPT | Mod: ZL | Performed by: INTERNAL MEDICINE

## 2024-10-24 PROCEDURE — 36415 COLL VENOUS BLD VENIPUNCTURE: CPT | Mod: ZL | Performed by: INTERNAL MEDICINE

## 2024-10-24 PROCEDURE — 80053 COMPREHEN METABOLIC PANEL: CPT | Mod: ZL | Performed by: INTERNAL MEDICINE

## 2024-10-24 PROCEDURE — 83735 ASSAY OF MAGNESIUM: CPT | Mod: ZL | Performed by: INTERNAL MEDICINE

## 2024-10-24 PROCEDURE — 85027 COMPLETE CBC AUTOMATED: CPT | Mod: ZL | Performed by: INTERNAL MEDICINE

## 2024-10-24 PROCEDURE — 80061 LIPID PANEL: CPT | Mod: ZL | Performed by: INTERNAL MEDICINE

## 2024-10-24 PROCEDURE — 82728 ASSAY OF FERRITIN: CPT | Mod: ZL | Performed by: INTERNAL MEDICINE

## 2024-10-24 PROCEDURE — 83036 HEMOGLOBIN GLYCOSYLATED A1C: CPT | Mod: ZL | Performed by: INTERNAL MEDICINE

## 2024-10-24 PROCEDURE — 99396 PREV VISIT EST AGE 40-64: CPT | Performed by: INTERNAL MEDICINE

## 2024-10-24 RX ORDER — VALACYCLOVIR HYDROCHLORIDE 1 G/1
1000 TABLET, FILM COATED ORAL 2 TIMES DAILY PRN
Qty: 20 TABLET | Refills: 4 | Status: SHIPPED | OUTPATIENT
Start: 2024-10-24

## 2024-10-24 RX ORDER — VARENICLINE TARTRATE 1 MG/1
1 TABLET, FILM COATED ORAL 2 TIMES DAILY
Qty: 180 TABLET | Refills: 0 | Status: ON HOLD | OUTPATIENT
Start: 2024-10-24 | End: 2024-11-13

## 2024-10-24 SDOH — HEALTH STABILITY: PHYSICAL HEALTH: ON AVERAGE, HOW MANY MINUTES DO YOU ENGAGE IN EXERCISE AT THIS LEVEL?: 10 MIN

## 2024-10-24 SDOH — HEALTH STABILITY: PHYSICAL HEALTH: ON AVERAGE, HOW MANY DAYS PER WEEK DO YOU ENGAGE IN MODERATE TO STRENUOUS EXERCISE (LIKE A BRISK WALK)?: 3 DAYS

## 2024-10-24 ASSESSMENT — ANXIETY QUESTIONNAIRES
7. FEELING AFRAID AS IF SOMETHING AWFUL MIGHT HAPPEN: SEVERAL DAYS
8. IF YOU CHECKED OFF ANY PROBLEMS, HOW DIFFICULT HAVE THESE MADE IT FOR YOU TO DO YOUR WORK, TAKE CARE OF THINGS AT HOME, OR GET ALONG WITH OTHER PEOPLE?: SOMEWHAT DIFFICULT
4. TROUBLE RELAXING: NEARLY EVERY DAY
GAD7 TOTAL SCORE: 8
7. FEELING AFRAID AS IF SOMETHING AWFUL MIGHT HAPPEN: SEVERAL DAYS
GAD7 TOTAL SCORE: 8
2. NOT BEING ABLE TO STOP OR CONTROL WORRYING: SEVERAL DAYS
GAD7 TOTAL SCORE: 8
6. BECOMING EASILY ANNOYED OR IRRITABLE: NOT AT ALL
5. BEING SO RESTLESS THAT IT IS HARD TO SIT STILL: SEVERAL DAYS
3. WORRYING TOO MUCH ABOUT DIFFERENT THINGS: SEVERAL DAYS
1. FEELING NERVOUS, ANXIOUS, OR ON EDGE: SEVERAL DAYS
IF YOU CHECKED OFF ANY PROBLEMS ON THIS QUESTIONNAIRE, HOW DIFFICULT HAVE THESE PROBLEMS MADE IT FOR YOU TO DO YOUR WORK, TAKE CARE OF THINGS AT HOME, OR GET ALONG WITH OTHER PEOPLE: SOMEWHAT DIFFICULT

## 2024-10-24 ASSESSMENT — PAIN SCALES - GENERAL: PAINLEVEL_OUTOF10: MILD PAIN (2)

## 2024-10-24 ASSESSMENT — PATIENT HEALTH QUESTIONNAIRE - PHQ9
SUM OF ALL RESPONSES TO PHQ QUESTIONS 1-9: 1
10. IF YOU CHECKED OFF ANY PROBLEMS, HOW DIFFICULT HAVE THESE PROBLEMS MADE IT FOR YOU TO DO YOUR WORK, TAKE CARE OF THINGS AT HOME, OR GET ALONG WITH OTHER PEOPLE: NOT DIFFICULT AT ALL
SUM OF ALL RESPONSES TO PHQ QUESTIONS 1-9: 1

## 2024-10-24 ASSESSMENT — SOCIAL DETERMINANTS OF HEALTH (SDOH): HOW OFTEN DO YOU GET TOGETHER WITH FRIENDS OR RELATIVES?: ONCE A WEEK

## 2024-10-24 NOTE — NURSING NOTE
"Chief Complaint   Patient presents with    Physical       Initial /88   Pulse 80   Resp 16   Ht 1.594 m (5' 2.75\")   Wt 73.3 kg (161 lb 9.6 oz)   LMP 08/07/2006 (Approximate)   SpO2 94%   Breastfeeding No   BMI 28.85 kg/m   Estimated body mass index is 28.85 kg/m  as calculated from the following:    Height as of this encounter: 1.594 m (5' 2.75\").    Weight as of this encounter: 73.3 kg (161 lb 9.6 oz).  Medication Review: complete    The next two questions are to help us understand your food security.  If you are feeling you need any assistance in this area, we have resources available to support you today.          10/24/2024   SDOH- Food Insecurity   Within the past 12 months, did you worry that your food would run out before you got money to buy more? N    Within the past 12 months, did the food you bought just not last and you didn t have money to get more? N        Patient-reported         Health Care Directive:  Patient does not have a Health Care Directive: Discussed advance care planning with patient; however, patient declined at this time.    Elisabet Cheng LPN      "

## 2024-10-24 NOTE — PATIENT INSTRUCTIONS
"Please check your blood pressure daily at various times, recordthis and bring it to your follow up appointment.  Your blood pressure goal is greater than 110/50 and less than 135/90.  Please call if it is consistently outside this range and we will need to increase Valsartan to twice daily.       High Blood Pressure: Essential Hypertension   ________________________________________________________________________  KEY POINTS  High blood pressure means that your blood pressure is higher than normal. It is called essential hypertension when no cause for it can be found.  Weight loss, changes in your diet, and exercise may be the only treatment you need. If lifestyle changes don t lower your blood pressure enough, yourhealthcare provider may prescribe medicine. Many people need to take 2 or more medicines to bring their blood pressure down to a healthy level.  Talk to your healthcare provider aboutyour personal and family medical history and your lifestyle habits. This will help you know what you can do to lower your risk for high blood pressure.  ________________________________________________________________________  What is high blood pressure?  Blood pressure is the force of bloodagainst artery walls as the heart pumps blood through the body. You may be told that you have high blood pressure (hypertension) if your blood pressure is higher than normal. Hypertension is called essential when no causefor it can be found. When the cause of hypertension is known, such as from kidney disease or a tumor, it is called secondary hypertension.  Blood pressure can rise and fall with exercise, rest, or emotions.  Normal resting blood pressure ranges up to 120/80 (\"120 over 80\"). The first number (120 in this example) is thepressure when the heart beats and pushes blood out to the rest of the body. The second number (80 in this example) is the pressure when the heart rests between beats.  Blood pressureis borderline high if it " is 120/80 or higher but less than 140/90.  High blood pressure is 140/90 or higher for most people. If you have chronic kidney disease, 130/80 or higher isconsidered high blood pressure.    Why is high blood pressure a problem?  High blood pressure is a problem in many ways.  Your heart has to work harder to pumpblood through your body. The added workload on the heart causes thickening of the heart muscle. Over time, the thickening damages the heart muscle so that it can no longer pump normally. This can lead to a disease calledheart failure.  The higher pressure in your arteries may cause them to weaken and bleed, resulting in a stroke.  As you get older, bloodvessels may become hardened. High blood pressure speeds up this process. Hardened or narrowed arteries may not be able to supply enough blood to all parts of your body.  High bloodpressure may lead to atherosclerosis, which is when deposits of cholesterol, fatty substances, and blood cells clog up an artery. Atherosclerosis is the leading cause of heart attacks. It can also cause strokes.  Your kidneys, brain, and eyes may be damaged.  You may need treatment for high blood pressure for the rest of your life.However, proper treatment can control your blood pressure and help prevent heart disease, heart attack, or stroke. It can also help prevent long-term health problems, such as heart failure, kidney failure, blindness, anddementia.  If you already have some complications, such as breathing problems or chest pain, lowering your blood pressure may make these problems less severe.    What is the cause?  There are no clear causes of essential hypertension. However, many things can increase blood pressure, such as:  Being overweight  Smoking  Eating a diet high in salt  Drinking a lot of alcohol  Other important factors include:  Race.  Americans are more likely to have high blood pressure.  Gender. Males have a greater chance of developing high blood  pressure thanwomen until age 55. After the age of 75, women are more likely to develop high blood pressure than men.  Heredity. If you have parents with high blood pressure, you are more at risk.  Age. The older you get, the more likely you are to have high blood pressure.  Also, some medicines increase bloodpressure.  Stress and drinking caffeine can make blood pressure go up temporarily, but it s not clear that they have any long-term effects on blood pressure.    What are the symptoms?  You may have high blood pressure for a long time without symptoms. You may not be able to tell by the way you feel that your bloodpressure is high. The only way to find out if your blood pressure is high is to have it measured. That's why it s important to have your blood pressure checked at least once a year.  When high blood pressure does cause symptoms, they may include:  Headaches  Nosebleeds  Getting tired easily  Blurred vision  Dizziness  Lightheadedness  Feeling like your heart is racing or fluttering  Shortness of breath  Chest pain  Memory problems  Daytime sleepiness    How is it diagnosed?  Blood pressure is checked at most healthcare visits. High blood pressure is usually discovered during one of these visits. Ifyour blood pressure is high, you will be asked to return for follow-up checks. Your healthcare provider will ask about your personal and family medical history and examine you.  Tests to look for a possible cause of highblood pressure may include:  Urine and blood tests  ChestX-ray  Electrocardiogram (ECG), which measures and records your heartbeat  You may be asked to use a portableblood-pressure measuring device, which will take your pressure at different times during day and night.    How is it treated?  If your bloodpressure is borderline high, you may be able to bring it down to a normal level without medicine. Weight loss, changes in your diet, and exercise may be the only treatment you need.  If  lifestyle changes don t lower your blood pressure enough, your healthcare provider may prescribe medicine. Many people need to take 2 or more medicines to bring their blood pressure down to a healthy level. It may takeseveral weeks or months to find the best treatment for you.    How can I take care of myself?  If you have high blood pressure, there are thingsyou can do now to take care of yourself and to prevent problems in the future:  Follow your treatmentplan and know how to take your medicines.  Work with your healthcare provider to find what lifestylechanges and medicines are right for you.  Follow the directions that come with your medicine, including information about food or alcohol. Make sure you know how and when to take yourmedicine. Do not take more or less than you are supposed to take.  Many medicines have side effects. A side effect is a symptom or problem that is caused by the medicine. Ask yourhealthcare provider or pharmacist what side effects your medicine may cause and what you should do if you have side effects. Ask if you should avoid some nonprescription medicines.  Be careful with nonprescription medicines or herbal supplements. Some can raise blood pressure. This includes diet pills, cold and pain medicines, and energy boosters. Read labels or ask your pharmacist if the medicine orsupplement affects blood pressure. Some illegal drugs, like cocaine, can also affect blood pressure.  Check your blood pressure (or have it checked) as often as your provider advises.Keep a diary of the readings. A diary is also a good place to note your exercise, weight, salt intake, types of food you are eating, and your feelings. This can help you learn how these things can affect your blood pressure.Take your diary with you when you visit your provider. It may help you and your provider manage your blood pressure and adjust your medicines if needed.  Don t smoke.  Eat a healthy diet that is low in salt,  saturated fat, trans fat, andcholesterol. Include lots of fruits, vegetables, and fat-free or low-fat milk and milk products.  Get regular exercise, according to your healthcare provider's advice. For example,you might walk, bike, or swim at least 30 minutes 3 to 5 times a week.  Limit the amount of alcohol you drink. Moderate drinking is up to 1 drink a day for women and up to 2 drinksfor men.  Lose weight if you need to.  Try to reduce the stress in your life or learn how to deal better with situations that make you feelanxious.  Ask your healthcare provider:  How and when youwill get your test results  How long it will take to recover  If there are activities you should avoid and when you can return to your normalactivities  How to take care of yourself at home  What symptoms or problems you should watch for and what to do if you have them  Make sure you know when you should come back for a checkup. Keep all appointments for provider visits or tests.    How can I help prevent high blood pressure?  You can helpprevent this disease with a heart-healthy lifestyle:  Eat a healthy diet and keep a healthy weight.  Stay fit with the right kind of exercise for you.  Decrease stress.  Don t smoke.  Limit your use of alcohol.  Talk to your healthcare provider about your personal and familymedical history and your lifestyle habits. This will help you know what you can do to lower your risk for high blood pressure.    Developed by [employer].  Adult Advisor 2016.3 published by [employer].  Lastmodified: 2016-04-18  Last reviewed: 2016-04-15  This content is reviewed periodically and is subject to change as new health information becomes available. The information is intended to inform and educate and is nota replacement for medical evaluation, advice, diagnosis or treatment by a healthcare professional.  References   Adult Advisor 2016.3 Index    Copyright   2016 [employer], a division of [employer]. All  rights reserved.

## 2024-10-24 NOTE — PROGRESS NOTES
"Preventive Care Visit  [facility] AND [facility]  Olya Loomis DO, Internal Medicine  Oct 24, 2024      Assessment & Plan     Vision changes  Exact etiology of her vision changes is unknown.  She was encouraged to be seen through ophthalmology for a second opinion.  Labs are obtained and overall unrevealing.  - CT Sinus w/o Contrast; Future  - CT Head w/o Contrast; Future  - Adult Eye  Referral; Future  - CBC with platelets  - Magnesium  - TSH with free T4 reflex  - Ferritin  - Hemoglobin A1c    Double vision  With double vision there is concern for possible pituitary abnormality.  Labs are normal however consider imaging of the brain.  Insurance denied prior MRI of the brain so we will try for CT of the head instead.  - CT Sinus w/o Contrast; Future  - CT Head w/o Contrast; Future    Sinus pressure  With sinus pressure and vision changes we will obtain a CT of the sinuses along with CT of the brain.  She will be contacted to discuss results after.  - CT Sinus w/o Contrast; Future  - CT Head w/o Contrast; Future    Herpes zoster with ophthalmic complication, unspecified herpes zoster eye disease  - Controlled.  Continue current regimen.    - valACYclovir (VALTREX) 1000 mg tablet; Take 1 tablet (1,000 mg) by mouth 2 times daily as needed (flare of shingles).    Encounter for smoking cessation counseling  Strongly encouraged to quit smoking.  Chantix sent in.  - varenicline (CHANTIX) 1 MG tablet; Take 1 tablet (1 mg) by mouth 2 times daily.    Encounter for screening mammogram for breast cancer  - MA Screening Bilateral w/ Solitario; Future    Screening for diabetes mellitus  - Comprehensive metabolic panel    Screening for hyperlipidemia  - Lipid Profile    Essential hypertension  - Controlled.  Continue current regimen.          BMI  Estimated body mass index is 28.85 kg/m  as calculated from the following:    Height as of this encounter: 1.594 m (5' 2.75\").    Weight as of this encounter: 73.3 kg (161 " lb 9.6 oz).       Counseling  Appropriate preventive services were addressed with this patient via screening, questionnaire, or discussion as appropriate for fall prevention, nutrition, physical activity, Tobacco-use cessation, social engagement, weight loss and cognition.  Checklist reviewing preventive services available has been given to the patient.  Reviewed patient's diet, addressing concerns and/or questions.   She is at risk for lack of exercise and has been provided with information to increase physical activity for the benefit of her well-being.       Return in about 4 weeks (around 11/21/2024) for after testing.    Carlos Arias is a 59 year old, presenting for the following:  Physical        10/24/2024     8:33 AM   Additional Questions   Roomed by Shiv Cheng LPN         HPI  Patient presents for follow-up and renewal of her medications.  She continues to have issues with vision changes and pressure particularly behind her right eye.  She was seen for this a few weeks back and imaging was ordered however denied by insurance.  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  She is unsure whether this is true or not.  We discussed potentially getting a second opinion.    She has a history of herpes zoster with ophthalmologic complication.  She does continue on valacyclovir.  She has not had any recurrence.    She continues to avoid smoking however does feel she would benefit from ongoing use of Chantix as it has been relatively recent she has been off of these.    She is due for additional labs including diabetes and cholesterol screening.  She is also due for mammogram.    She has a history of hypertension and continues on hydrochlorothiazide and losartan.  Her blood pressure is controlled.  She also continues on duloxetine for mood and  she does feel this has been beneficial.      Health Care Directive  Patient does not have a Health Care Directive: Discussed advance care planning with patient; information given to patient to review.      10/24/2024   General Health   How would you rate your overall physical health? Good   Feel stress (tense, anxious, or unable to sleep) To some extent      (!) STRESS CONCERN      10/24/2024   Nutrition   Three or more servings of calcium each day? Yes   Diet: Regular (no restrictions)   How many servings of fruit and vegetables per day? (!) 2-3   How many sweetened beverages each day? 0-1            10/24/2024   Exercise   Days per week of moderate/strenous exercise 3 days   Average minutes spent exercising at this level 10 min            10/24/2024   Social Factors   Frequency of gathering with friends or relatives Once a week   Worry food won't last until get money to buy more No   Food not last or not have enough money for food? No   Do you have housing? (Housing is defined as stable permanent housing and does not include staying ouside in a car, in a tent, in an abandoned building, in an overnight shelter, or couch-surfing.) Yes   Are you worried about losing your housing? No   Lack of transportation? No   Unable to get utilities (heat,electricity)? No            10/24/2024   Fall Risk   Fallen 2 or more times in the past year? No    Trouble with walking or balance? Yes    Gait Speed Test Interpretation Less than or equal to 5.00 seconds - PASS       Patient-reported           10/24/2024   Dental   Dentist two times every year? Yes            10/24/2024   TB Screening   Were you born outside of the [place]? No          Today's PHQ-9 Score:       10/24/2024     7:38 AM   PHQ-9 SCORE   PHQ-9 Total Score MyChart 1 (Minimal depression)   PHQ-9 Total Score 1        Patient-reported         10/24/2024   Substance Use   If I could quit smoking, I would Completely agree   I want to quit somking, worry about health affects  Completely agree   Willing to make a plan to quit smoking Completely agree   Willing to cut down before quitting Completely agree   Alcohol more than 3/day or more than 7/wk No   Do you use any other substances recreationally? No        Social History     Tobacco Use    Smoking status: Former     Current packs/day: 0.00     Average packs/day: 0.3 packs/day for 19.7 years (4.9 ttl pk-yrs)     Types: Cigarettes     Start date: 2003     Quit date: 3/1/2023     Years since quittin.6    Smokeless tobacco: Never    Tobacco comments:     Started smoking at age 38.  Typically 1 pack a day but cutting back - working on quitting and is now down to about 5 per day.    Vaping Use    Vaping status: Never Used   Substance Use Topics    Alcohol use: No     Alcohol/week: 0.0 standard drinks of alcohol     Comment: Alcoholic Drinks/day: rare    Drug use: No     Comment:            Mammogram Screening - Mammogram every 1-2 years updated in Health Maintenance based on mutual decision making        10/24/2024   STI Screening   New sexual partner(s) since last STI/HIV test? No        History of abnormal Pap smear: No - age 30-64 HPV with reflex Pap every 5 years recommended       ASCVD Risk   The 10-year ASCVD risk score (Lizet HALE, et al., 2019) is: 6.3%    Values used to calculate the score:      Age: 59 years      Sex: Female      Is Non- : No      Diabetic: No      Tobacco smoker: No      Systolic Blood Pressure: 136 mmHg      Is BP treated: Yes      HDL Cholesterol: 47 mg/dL      Total Cholesterol: 209 mg/dL         Reviewed and updated as needed this visit by Provider   Tobacco  Allergies  Meds  Problems  Med Hx  Surg Hx  Fam Hx  Soc   Hx Sexual Activity          Current Outpatient Medications   Medication Sig Dispense Refill    DULoxetine (CYMBALTA) 60 MG capsule TAKE 1 CAPSULE(60 MG) BY MOUTH DAILY 90 capsule 4    fluticasone furoate 27.5 MCG/SPRAY nasal spray [place] 2 sprays into  "both nostrils daily. 9.1 mL 1    hydrochlorothiazide (HYDRODIURIL) 25 MG tablet TAKE 1 TABLET(25 MG) BY MOUTH EVERY MORNING 90 tablet 4    neomycin-polymyxin-hydrocortisone (CORTISPORIN) 3.5-25691-6 otic suspension Place 4 drops into both ears 3 times daily as needed (ear drainage/itching) 10 mL 3    ofloxacin (FLOXIN) 0.3 % otic solution Place 5 drops into both ears 2 times daily. 10 mL 1    valACYclovir (VALTREX) 1000 mg tablet Take 1 tablet (1,000 mg) by mouth 2 times daily as needed (flare of shingles). 20 tablet 4    valsartan (DIOVAN) 160 MG tablet TAKE 1 TABLET(160 MG) BY MOUTH EVERY EVENING 90 tablet 4    varenicline (CHANTIX) 1 MG tablet Take 1 tablet (1 mg) by mouth 2 times daily. 180 tablet 0       ROS:  CONSTITUTIONAL: Negative for fever, chills, night sweats, significant change in weight  INTEGUMENTARY/SKIN/LYMPH: Negative for worrisome rashes, moles or lesions; swollen lymph nodes  EYES: Negative for significant vision changes or irritation  ENT: Negative for additional ear, mouth and throat problems  RESP: Negative for significant cough or SOB  CV: Negative for chest pain, palpitations or increased peripheral edema  GI: Negative for abdominal pain, or change in bowel habits or blood in the stools/black stools  : Negative for unusual urinary or vaginal symptoms. No vaginal bleeding.  MUSCULOSKELETAL: Negative for new or changing joint pain or significant swelling  NEURO: Negative for new headaches, weakness or paraesthesias  PSYCHIATRIC: Negative for changes in mood or affect; significant anxiety or depression       Objective    Exam  /88   Pulse 80   Resp 16   Ht 1.594 m (5' 2.75\")   Wt 73.3 kg (161 lb 9.6 oz)   LMP 08/07/2006 (Approximate)   SpO2 94%   Breastfeeding No   BMI 28.85 kg/m     Estimated body mass index is 28.85 kg/m  as calculated from the following:    Height as of this encounter: 1.594 m (5' 2.75\").    Weight as of this encounter: 73.3 kg (161 lb 9.6 oz).    Physical " Exam  GEN: Vitals reviewed. Healthy appearing. Patient is in no acute distress. Cooperative with exam.  HEENT: Normocephalic atraumatic.  Eyes grossly normal to inspection.  No discharge or erythema, or obvious scleral/conjunctival abnormalities. Oropharynx with no erythema or exudates. Dentition adequate.  EACs clear bilaterally, TM gray with normal landmarks.  NECK: Supple; no thyromegaly or masses noted.  No cervical or supraclavicular lymphadenopathy.  CV: Heart regular in rate and rhythm with no murmur.    LUNGS: No audible wheeze, cough, or visible cyanosis.  No visible retractions or increased work of breathing.  Lungs clear to auscultation bilaterally.    ABD:  Nondistended  SKIN: Warm and dry to touch.  Visible skin clear. No significant rash, abnormal pigmentation or lesions.  EXT: No clubbing or cyanosis.  No peripheral edema.  NEURO: Alert and oriented to person, place, and time.  Cranial nerves II-XII grossly intact with no focal or lateralizing deficits.  Muscle tone normal.  Gait normal. No tremor.   MSK: ROM of upper and lower ext symmetric and full.  PSYCH: Mood is good.  Mentation appears normal, affect normal/bright, judgement and insight intact, normal speech and appearance well-groomed.          Signed Electronically by: Olya Loomis, DO    Answers submitted by the patient for this visit:  Patient Health Questionnaire (Submitted on 10/24/2024)  If you checked off any problems, how difficult have these problems made it for you to do your work, take care of things at home, or get along with other people?: Not difficult at all  PHQ9 TOTAL SCORE: 1  Patient Health Questionnaire (G7) (Submitted on 10/24/2024)  CARMINA 7 TOTAL SCORE: 8

## 2024-10-25 ENCOUNTER — TELEPHONE (OUTPATIENT)
Dept: INTERNAL MEDICINE | Facility: OTHER | Age: 59
End: 2024-10-25
Payer: COMMERCIAL

## 2024-10-25 NOTE — TELEPHONE ENCOUNTER
CT Head- CPT--65683-Mxfq not meet medical necessity. The provider needs to do a peer to peer call at [phone] to speak to a physician reviewer and use reference #[id number]

## 2024-10-31 NOTE — TELEPHONE ENCOUNTER
I called the insurance company and they have authorized her CT head through 12/22/24.  Case code [id number]. Please call her and have her contact imaging to schedule this. #[phone]

## 2024-10-31 NOTE — TELEPHONE ENCOUNTER
Pt notified.  She has an appt for this already scheduled for this Friday.  Elisabet Cheng LPN on 10/31/2024 at 12:32 PM

## 2024-11-01 ENCOUNTER — MYC MEDICAL ADVICE (OUTPATIENT)
Dept: INTERNAL MEDICINE | Facility: OTHER | Age: 59
End: 2024-11-01
Payer: COMMERCIAL

## 2024-11-01 ENCOUNTER — HOSPITAL ENCOUNTER (OUTPATIENT)
Dept: CT IMAGING | Facility: OTHER | Age: 59
Discharge: HOME | End: 2024-11-01
Attending: INTERNAL MEDICINE | Admitting: INTERNAL MEDICINE
Payer: COMMERCIAL

## 2024-11-01 DIAGNOSIS — H53.9 VISION CHANGES: ICD-10-CM

## 2024-11-01 DIAGNOSIS — J34.89 SINUS PRESSURE: ICD-10-CM

## 2024-11-01 DIAGNOSIS — H53.2 DOUBLE VISION: ICD-10-CM

## 2024-11-01 PROCEDURE — 70486 CT MAXILLOFACIAL W/O DYE: CPT

## 2024-11-01 NOTE — TELEPHONE ENCOUNTER
Pt wondering about CT sinuses done today (11/1).    FINDINGS: There is minimal mucosal thickening in the floor of the left  maxillary sinus. The remainder of the paranasal sinuses are clear. The  nasal septum is midline. Turbinates are normal. The retropharyngeal  soft tissues are normal.  IMPRESSION: Minimal mucosal thickening seen in the floor of the left  maxillary sinus    I cannot respond to her question until results are reviewed.     Routing to provider to review and respond.  Sonam Lin RN on 11/1/2024 at 11:51 AM     [facility]  OB/GYN  [address] [place] [address]  Phone: [phone]  Fax:   Follow Up Time: 4-6 Days

## 2024-11-01 NOTE — TELEPHONE ENCOUNTER
I commented on the results of her CT scan.  The CT scan did review both sinuses.  The CT scan was unremarkable.  She can follow-up in the clinic with any further concerns.

## 2024-11-04 ENCOUNTER — TRANSFERRED RECORDS (OUTPATIENT)
Dept: HEALTH INFORMATION MANAGEMENT | Facility: OTHER | Age: 59
End: 2024-11-04
Payer: COMMERCIAL

## 2024-11-04 ENCOUNTER — MYC MEDICAL ADVICE (OUTPATIENT)
Dept: INTERNAL MEDICINE | Facility: OTHER | Age: 59
End: 2024-11-04
Payer: COMMERCIAL

## 2024-11-04 DIAGNOSIS — H53.9 VISION CHANGES: Primary | ICD-10-CM

## 2024-11-04 DIAGNOSIS — J01.90 ACUTE SINUSITIS WITH SYMPTOMS > 10 DAYS: ICD-10-CM

## 2024-11-04 DIAGNOSIS — R93.0 ABNORMAL CT OF THE HEAD: ICD-10-CM

## 2024-11-04 DIAGNOSIS — H53.2 DOUBLE VISION: ICD-10-CM

## 2024-11-04 DIAGNOSIS — R90.82 WHITE MATTER DISEASE: ICD-10-CM

## 2024-11-05 NOTE — TELEPHONE ENCOUNTER
Pt requesting MRI per Dr Ocampo. Eyes are healthy.     Per OV from 10/24:  Vision changes  Exact etiology of her vision changes is unknown.  She was encouraged to be seen through ophthalmology for a second opinion.  Labs are obtained and overall unrevealing.  Double vision  With double vision there is concern for possible pituitary abnormality.  Labs are normal however consider imaging of the brain.  Insurance denied prior MRI of the brain so we will try for CT of the head instead.    Per [employer] message from 10/2:  insurance company approved one of the MRI's but not the other that Ashley Jesus had ordered (both of the head but looking at different things/structures).    Sonam Lin RN on 11/5/2024 at 11:51 AM

## 2024-11-05 NOTE — TELEPHONE ENCOUNTER
Olya,    Can you re-sign this brain MRI order if this would still be recommended after head CT and normal vision exam?     _____________________________________________________________________________    Called [facility].     They are asking for new Brain MRI (if still appropriate) to be submitted so they can start working on insurance approval again (old one was closed out/denied).      10/24/24  OV note with Vladislav supports the need for both MRI's.     MRI of orbits was approved. (Will need to activate orbit MRI again if we get brain approval).       Bee Haq RN on 11/5/2024 at 1:26 PM

## 2024-11-06 NOTE — TELEPHONE ENCOUNTER
"Got a call from [facility].      When she tried to submit MRI request for brain, insurance states \"no abnormal labs\".   (Could try submitting with recent 10/24/24 OV note from Vladislav but not likely to be approved without abnormal labs).      MRI of orbits is approved and    CT of head/brain is approved.    Would it make sense to start with head CT and MRI of orbits- to see if this would get further with those results?     Difficulties getting brain MRI approved without abnormal labs.  (MRI of neck ordered today- unsure why).      Approved for CT of head (not completed yet).  Only CT of sinuses completed on 11/1 but since- they have approved the CT of head since then.      (MRA of neck, MRA of brain- [place] of ac, and MR of brain with and without ordered today by [facility]- not sure why).      Bee Haq RN on 11/6/2024 at 3:31 PM      "

## 2024-11-07 ENCOUNTER — HOSPITAL ENCOUNTER (OUTPATIENT)
Dept: CT IMAGING | Facility: OTHER | Age: 59
Discharge: HOME | End: 2024-11-07
Attending: INTERNAL MEDICINE | Admitting: INTERNAL MEDICINE
Payer: COMMERCIAL

## 2024-11-07 ENCOUNTER — TELEPHONE (OUTPATIENT)
Dept: INTERNAL MEDICINE | Facility: OTHER | Age: 59
End: 2024-11-07
Payer: COMMERCIAL

## 2024-11-07 DIAGNOSIS — H53.9 VISION CHANGES: Primary | ICD-10-CM

## 2024-11-07 DIAGNOSIS — H53.9 VISION CHANGES: ICD-10-CM

## 2024-11-07 DIAGNOSIS — H53.2 DOUBLE VISION: ICD-10-CM

## 2024-11-07 DIAGNOSIS — J34.89 SINUS PRESSURE: ICD-10-CM

## 2024-11-07 PROCEDURE — 70450 CT HEAD/BRAIN W/O DYE: CPT

## 2024-11-07 NOTE — TELEPHONE ENCOUNTER
Juana saw Dr Ocampo earlier this week for her vision changes and had the CT completed today.  She states that Dr Ocampo thinks that the visual changes are more neurological and is recommending that she see a neurologist.  Juana has no preference on where she be referred to.  Referral is pended for a signature if you agree.  Elisabet Cheng, LPN on 11/7/2024 at 3:48 PM

## 2024-11-07 NOTE — TELEPHONE ENCOUNTER
Reason for call: Request for a referral.    Referral requested for what concern?  neurology    Have you already been seen by the specialty you need the referral for?    no    If no,  Where do you want to go?   Open to where ever they can get into the soonest.    Preferred method for responding to this message: Telephone Call    Phone number patient can be reached at? Cell number on file:    Telephone Information:   Home [phone]       If we can't reach you directly, may we leave a detailed response at the number you provided? Yes    Amada Golden on 11/7/2024 at 3:36 PM

## 2024-11-08 NOTE — TELEPHONE ENCOUNTER
Patient called regarding abnormal results and recommendations from ophthalmology. Recommend referral to Neurology, order placed to whoever can see her soonest.  She does not have any preference.  If she is able to get into neurology soon would defer additional MR testing to them as it may be more likely to be covered by insurance.    If she cannot be seen within the next 4 to 6 weeks would recommend we try to pursue additional imaging ahead of time.  Ideally we would get an MRI of the brain.  If she cannot get into neurology soon and we cannot get an MRI of the brain due to insurance would recommend moving forward with MRI of the orbits which has been approved but is less likely to be of utility.

## 2024-11-08 NOTE — TELEPHONE ENCOUNTER
Called and updated [facility] of the message below.      10/2 MRI of brain with and without contrast- able to be approved with CT scan that was performed yesterday?      She will look in to on Monday.     Bee Haq RN on 11/8/2024 at 4:02 PM

## 2024-11-08 NOTE — TELEPHONE ENCOUNTER
Pt notified.  She will call us next week if she has not heard anything yet.  Elisabet Cheng LPN on 11/8/2024 at 9:38 AM

## 2024-11-12 ENCOUNTER — APPOINTMENT (OUTPATIENT)
Dept: GENERAL RADIOLOGY | Facility: OTHER | Age: 59
End: 2024-11-12
Attending: FAMILY MEDICINE
Payer: COMMERCIAL

## 2024-11-12 ENCOUNTER — APPOINTMENT (OUTPATIENT)
Dept: CT IMAGING | Facility: OTHER | Age: 59
End: 2024-11-12
Attending: FAMILY MEDICINE
Payer: COMMERCIAL

## 2024-11-12 ENCOUNTER — TELEPHONE (OUTPATIENT)
Dept: INTERNAL MEDICINE | Facility: OTHER | Age: 59
End: 2024-11-12
Payer: COMMERCIAL

## 2024-11-12 ENCOUNTER — HOSPITAL ENCOUNTER (OUTPATIENT)
Facility: OTHER | Age: 59
Setting detail: OBSERVATION
Discharge: HOME | End: 2024-11-13
Attending: FAMILY MEDICINE
Payer: COMMERCIAL

## 2024-11-12 DIAGNOSIS — I15.9 SECONDARY HYPERTENSION: ICD-10-CM

## 2024-11-12 DIAGNOSIS — I10 ESSENTIAL HYPERTENSION: ICD-10-CM

## 2024-11-12 DIAGNOSIS — R51.9 NONINTRACTABLE HEADACHE, UNSPECIFIED CHRONICITY PATTERN, UNSPECIFIED HEADACHE TYPE: ICD-10-CM

## 2024-11-12 DIAGNOSIS — R51.9 INTRACTABLE HEADACHE, UNSPECIFIED CHRONICITY PATTERN, UNSPECIFIED HEADACHE TYPE: ICD-10-CM

## 2024-11-12 DIAGNOSIS — F41.1 GENERALIZED ANXIETY DISORDER: ICD-10-CM

## 2024-11-12 DIAGNOSIS — H53.9 VISION CHANGES: ICD-10-CM

## 2024-11-12 DIAGNOSIS — E78.2 MIXED HYPERLIPIDEMIA: ICD-10-CM

## 2024-11-12 DIAGNOSIS — G44.89 OTHER HEADACHE SYNDROME: ICD-10-CM

## 2024-11-12 DIAGNOSIS — R06.02 SHORTNESS OF BREATH: Primary | ICD-10-CM

## 2024-11-12 LAB
ANION GAP SERPL CALCULATED.3IONS-SCNC: 13 MMOL/L (ref 7–15)
APTT PPP: 29 SECONDS (ref 22–38)
BASOPHILS # BLD AUTO: 0 10E3/UL (ref 0–0.2)
BASOPHILS NFR BLD AUTO: 0 %
BUN SERPL-MCNC: 12.8 MG/DL (ref 8–23)
CALCIUM SERPL-MCNC: 9.2 MG/DL (ref 8.8–10.4)
CHLORIDE SERPL-SCNC: 100 MMOL/L (ref 98–107)
CHOLEST SERPL-MCNC: 237 MG/DL
CREAT SERPL-MCNC: 0.78 MG/DL (ref 0.51–0.95)
EGFRCR SERPLBLD CKD-EPI 2021: 86 ML/MIN/1.73M2
EOSINOPHIL # BLD AUTO: 0.1 10E3/UL (ref 0–0.7)
EOSINOPHIL NFR BLD AUTO: 1 %
ERYTHROCYTE [DISTWIDTH] IN BLOOD BY AUTOMATED COUNT: 13.4 % (ref 10–15)
GLUCOSE BLDC GLUCOMTR-MCNC: 87 MG/DL (ref 70–99)
GLUCOSE BLDC GLUCOMTR-MCNC: 93 MG/DL (ref 70–99)
GLUCOSE SERPL-MCNC: 96 MG/DL (ref 70–99)
HCO3 SERPL-SCNC: 26 MMOL/L (ref 22–29)
HCT VFR BLD AUTO: 42 % (ref 35–47)
HDLC SERPL-MCNC: 54 MG/DL
HGB BLD-MCNC: 13.9 G/DL (ref 11.7–15.7)
IMM GRANULOCYTES # BLD: 0 10E3/UL
IMM GRANULOCYTES NFR BLD: 0 %
INR PPP: 0.94 (ref 0.85–1.15)
LDLC SERPL CALC-MCNC: 148 MG/DL
LYMPHOCYTES # BLD AUTO: 2.5 10E3/UL (ref 0.8–5.3)
LYMPHOCYTES NFR BLD AUTO: 25 %
MCH RBC QN AUTO: 29.5 PG (ref 26.5–33)
MCHC RBC AUTO-ENTMCNC: 33.1 G/DL (ref 31.5–36.5)
MCV RBC AUTO: 89 FL (ref 78–100)
MONOCYTES # BLD AUTO: 0.8 10E3/UL (ref 0–1.3)
MONOCYTES NFR BLD AUTO: 8 %
NEUTROPHILS # BLD AUTO: 6.4 10E3/UL (ref 1.6–8.3)
NEUTROPHILS NFR BLD AUTO: 65 %
NONHDLC SERPL-MCNC: 183 MG/DL
NRBC # BLD AUTO: 0 10E3/UL
NRBC BLD AUTO-RTO: 0 /100
NT-PROBNP SERPL-MCNC: <36 PG/ML (ref 0–900)
PLATELET # BLD AUTO: 418 10E3/UL (ref 150–450)
POTASSIUM SERPL-SCNC: 3.7 MMOL/L (ref 3.4–5.3)
RBC # BLD AUTO: 4.71 10E6/UL (ref 3.8–5.2)
SODIUM SERPL-SCNC: 139 MMOL/L (ref 135–145)
TRIGL SERPL-MCNC: 174 MG/DL
TROPONIN T SERPL HS-MCNC: 6 NG/L
WBC # BLD AUTO: 10 10E3/UL (ref 4–11)

## 2024-11-12 PROCEDURE — 99207 PR NOT IN PERSON INPATIENT CONSULT STATISTICAL MARKER: CPT | Performed by: STUDENT IN AN ORGANIZED HEALTH CARE EDUCATION/TRAINING PROGRAM

## 2024-11-12 PROCEDURE — 99285 EMERGENCY DEPT VISIT HI MDM: CPT | Mod: 25 | Performed by: FAMILY MEDICINE

## 2024-11-12 PROCEDURE — 80048 BASIC METABOLIC PNL TOTAL CA: CPT | Performed by: FAMILY MEDICINE

## 2024-11-12 PROCEDURE — 250N000011 HC RX IP 250 OP 636: Performed by: FAMILY MEDICINE

## 2024-11-12 PROCEDURE — 70496 CT ANGIOGRAPHY HEAD: CPT | Mod: TC

## 2024-11-12 PROCEDURE — 36415 COLL VENOUS BLD VENIPUNCTURE: CPT | Performed by: FAMILY MEDICINE

## 2024-11-12 PROCEDURE — 99285 EMERGENCY DEPT VISIT HI MDM: CPT | Performed by: FAMILY MEDICINE

## 2024-11-12 PROCEDURE — 93010 ELECTROCARDIOGRAM REPORT: CPT | Performed by: INTERNAL MEDICINE

## 2024-11-12 PROCEDURE — 85610 PROTHROMBIN TIME: CPT | Performed by: FAMILY MEDICINE

## 2024-11-12 PROCEDURE — 83880 ASSAY OF NATRIURETIC PEPTIDE: CPT | Performed by: FAMILY MEDICINE

## 2024-11-12 PROCEDURE — 96374 THER/PROPH/DIAG INJ IV PUSH: CPT

## 2024-11-12 PROCEDURE — 80061 LIPID PANEL: CPT | Performed by: INTERNAL MEDICINE

## 2024-11-12 PROCEDURE — 85048 AUTOMATED LEUKOCYTE COUNT: CPT | Performed by: FAMILY MEDICINE

## 2024-11-12 PROCEDURE — 99222 1ST HOSP IP/OBS MODERATE 55: CPT | Mod: 95 | Performed by: INTERNAL MEDICINE

## 2024-11-12 PROCEDURE — 82962 GLUCOSE BLOOD TEST: CPT

## 2024-11-12 PROCEDURE — 93005 ELECTROCARDIOGRAM TRACING: CPT | Performed by: FAMILY MEDICINE

## 2024-11-12 PROCEDURE — G0378 HOSPITAL OBSERVATION PER HR: HCPCS

## 2024-11-12 PROCEDURE — 85730 THROMBOPLASTIN TIME PARTIAL: CPT | Performed by: FAMILY MEDICINE

## 2024-11-12 PROCEDURE — 85014 HEMATOCRIT: CPT | Performed by: FAMILY MEDICINE

## 2024-11-12 PROCEDURE — 250N000009 HC RX 250: Performed by: FAMILY MEDICINE

## 2024-11-12 PROCEDURE — 250N000013 HC RX MED GY IP 250 OP 250 PS 637: Performed by: INTERNAL MEDICINE

## 2024-11-12 PROCEDURE — 84484 ASSAY OF TROPONIN QUANT: CPT | Performed by: FAMILY MEDICINE

## 2024-11-12 PROCEDURE — 70450 CT HEAD/BRAIN W/O DYE: CPT | Mod: TC

## 2024-11-12 PROCEDURE — 71045 X-RAY EXAM CHEST 1 VIEW: CPT

## 2024-11-12 PROCEDURE — 85025 COMPLETE CBC W/AUTO DIFF WBC: CPT | Performed by: FAMILY MEDICINE

## 2024-11-12 PROCEDURE — 82310 ASSAY OF CALCIUM: CPT | Performed by: FAMILY MEDICINE

## 2024-11-12 RX ORDER — ACETAMINOPHEN 325 MG/1
650 TABLET ORAL EVERY 6 HOURS PRN
Status: DISCONTINUED | OUTPATIENT
Start: 2024-11-12 | End: 2024-11-14 | Stop reason: HOSPADM

## 2024-11-12 RX ORDER — HYDROCHLOROTHIAZIDE 25 MG/1
25 TABLET ORAL EVERY MORNING
Status: DISCONTINUED | OUTPATIENT
Start: 2024-11-13 | End: 2024-11-14 | Stop reason: HOSPADM

## 2024-11-12 RX ORDER — DULOXETIN HYDROCHLORIDE 30 MG/1
60 CAPSULE, DELAYED RELEASE ORAL DAILY
Status: DISCONTINUED | OUTPATIENT
Start: 2024-11-13 | End: 2024-11-14 | Stop reason: HOSPADM

## 2024-11-12 RX ORDER — IOPAMIDOL 755 MG/ML
75 INJECTION, SOLUTION INTRAVASCULAR ONCE
Status: COMPLETED | OUTPATIENT
Start: 2024-11-12 | End: 2024-11-12

## 2024-11-12 RX ORDER — LOSARTAN POTASSIUM 25 MG/1
100 TABLET ORAL EVERY EVENING
Status: DISCONTINUED | OUTPATIENT
Start: 2024-11-12 | End: 2024-11-14 | Stop reason: HOSPADM

## 2024-11-12 RX ORDER — HYDRALAZINE HYDROCHLORIDE 20 MG/ML
20 INJECTION INTRAMUSCULAR; INTRAVENOUS EVERY 4 HOURS PRN
Status: DISCONTINUED | OUTPATIENT
Start: 2024-11-12 | End: 2024-11-14 | Stop reason: HOSPADM

## 2024-11-12 RX ADMIN — HYDRALAZINE HYDROCHLORIDE 20 MG: 20 INJECTION INTRAMUSCULAR; INTRAVENOUS at 21:20

## 2024-11-12 RX ADMIN — SODIUM CHLORIDE 70 ML: 9 INJECTION, SOLUTION INTRAVENOUS at 20:21

## 2024-11-12 RX ADMIN — IOPAMIDOL 75 ML: 755 INJECTION, SOLUTION INTRAVENOUS at 20:20

## 2024-11-12 RX ADMIN — ACETAMINOPHEN 650 MG: 325 TABLET, FILM COATED ORAL at 22:42

## 2024-11-12 RX ADMIN — LOSARTAN POTASSIUM 100 MG: 25 TABLET, FILM COATED ORAL at 22:42

## 2024-11-12 ASSESSMENT — ENCOUNTER SYMPTOMS
LIGHT-HEADEDNESS: 1
DIARRHEA: 0
CHEST TIGHTNESS: 0
SHORTNESS OF BREATH: 1
TREMORS: 0
COUGH: 0
CHOKING: 0
HEADACHES: 1
VOMITING: 0
PALPITATIONS: 0
FATIGUE: 0
FEVER: 1
SEIZURES: 0
DIZZINESS: 0
NUMBNESS: 0
NAUSEA: 0
FACIAL ASYMMETRY: 0
SPEECH DIFFICULTY: 0
WEAKNESS: 0

## 2024-11-12 ASSESSMENT — ACTIVITIES OF DAILY LIVING (ADL)
ADLS_ACUITY_SCORE: 0

## 2024-11-12 NOTE — TELEPHONE ENCOUNTER
Pt states that she is not having headaches today and currently does not have any concerns.  She did have a headache all day yesterday and her daughter was concerned.  Juana will present to the clinic for further evaluation if she does have more issues with headaches.  She has not currently heard back as of yet in regards to the neurology referral.  Elisabet Cheng LPN on 11/12/2024 at 12:38 PM

## 2024-11-12 NOTE — TELEPHONE ENCOUNTER
"11/7/24  Neurology Referral sent to Portal \"Priority 1-2 weeks\".     11/7/24  Neurology Referral signed first has routine priority.      Bee Haq RN on 11/12/2024 at 4:27 PM    "

## 2024-11-12 NOTE — TELEPHONE ENCOUNTER
Patients daughter called on behalf of the patient and requested a call back to the patient regarding if she should be worried about headaches and pressure.      Cathy Alexander on 11/12/2024 at 11:41 AM

## 2024-11-13 ENCOUNTER — APPOINTMENT (OUTPATIENT)
Dept: MRI IMAGING | Facility: OTHER | Age: 59
End: 2024-11-13
Attending: FAMILY MEDICINE
Payer: COMMERCIAL

## 2024-11-13 VITALS
SYSTOLIC BLOOD PRESSURE: 129 MMHG | OXYGEN SATURATION: 96 % | HEIGHT: 63 IN | WEIGHT: 161.9 LBS | HEART RATE: 76 BPM | DIASTOLIC BLOOD PRESSURE: 70 MMHG | BODY MASS INDEX: 28.69 KG/M2 | RESPIRATION RATE: 18 BRPM | TEMPERATURE: 98.6 F

## 2024-11-13 DIAGNOSIS — I10 ESSENTIAL HYPERTENSION: ICD-10-CM

## 2024-11-13 DIAGNOSIS — R51.9 INTRACTABLE HEADACHE, UNSPECIFIED CHRONICITY PATTERN, UNSPECIFIED HEADACHE TYPE: ICD-10-CM

## 2024-11-13 PROBLEM — G44.89 OTHER HEADACHE SYNDROME: Status: ACTIVE | Noted: 2024-11-12

## 2024-11-13 LAB
GLUCOSE BLDC GLUCOMTR-MCNC: 78 MG/DL (ref 70–99)
GLUCOSE BLDC GLUCOMTR-MCNC: 81 MG/DL (ref 70–99)
GLUCOSE BLDC GLUCOMTR-MCNC: 94 MG/DL (ref 70–99)
GLUCOSE BLDC GLUCOMTR-MCNC: 96 MG/DL (ref 70–99)

## 2024-11-13 PROCEDURE — 82962 GLUCOSE BLOOD TEST: CPT

## 2024-11-13 PROCEDURE — A9575 INJ GADOTERATE MEGLUMI 0.1ML: HCPCS | Performed by: FAMILY MEDICINE

## 2024-11-13 PROCEDURE — 70553 MRI BRAIN STEM W/O & W/DYE: CPT

## 2024-11-13 PROCEDURE — 255N000002 HC RX 255 OP 636: Performed by: FAMILY MEDICINE

## 2024-11-13 PROCEDURE — 250N000013 HC RX MED GY IP 250 OP 250 PS 637: Performed by: INTERNAL MEDICINE

## 2024-11-13 PROCEDURE — G0378 HOSPITAL OBSERVATION PER HR: HCPCS

## 2024-11-13 PROCEDURE — 250N000013 HC RX MED GY IP 250 OP 250 PS 637: Performed by: FAMILY MEDICINE

## 2024-11-13 PROCEDURE — 99239 HOSP IP/OBS DSCHRG MGMT >30: CPT | Performed by: FAMILY MEDICINE

## 2024-11-13 PROCEDURE — 70543 MRI ORBT/FAC/NCK W/O &W/DYE: CPT

## 2024-11-13 RX ORDER — ASPIRIN 81 MG/1
81 TABLET ORAL DAILY
Qty: 100 TABLET | Refills: 0 | Status: SHIPPED | OUTPATIENT
Start: 2024-11-13

## 2024-11-13 RX ORDER — GADOTERATE MEGLUMINE 376.9 MG/ML
15 INJECTION INTRAVENOUS ONCE
Status: COMPLETED | OUTPATIENT
Start: 2024-11-13 | End: 2024-11-13

## 2024-11-13 RX ORDER — METOPROLOL SUCCINATE 25 MG/1
25 TABLET, EXTENDED RELEASE ORAL DAILY
Status: DISCONTINUED | OUTPATIENT
Start: 2024-11-13 | End: 2024-11-14 | Stop reason: HOSPADM

## 2024-11-13 RX ORDER — METOPROLOL SUCCINATE 25 MG/1
25 TABLET, EXTENDED RELEASE ORAL DAILY
Qty: 30 TABLET | Refills: 0 | Status: SHIPPED | OUTPATIENT
Start: 2024-11-13 | End: 2024-11-14

## 2024-11-13 RX ADMIN — GADOTERATE MEGLUMINE 14 ML: 376.9 INJECTION INTRAVENOUS at 19:43

## 2024-11-13 RX ADMIN — METOPROLOL SUCCINATE 25 MG: 25 TABLET, EXTENDED RELEASE ORAL at 17:57

## 2024-11-13 RX ADMIN — DULOXETINE HYDROCHLORIDE 60 MG: 30 CAPSULE, DELAYED RELEASE PELLETS ORAL at 09:09

## 2024-11-13 RX ADMIN — HYDROCHLOROTHIAZIDE 25 MG: 25 TABLET ORAL at 07:49

## 2024-11-13 RX ADMIN — LOSARTAN POTASSIUM 100 MG: 25 TABLET, FILM COATED ORAL at 20:02

## 2024-11-13 RX ADMIN — ACETAMINOPHEN 650 MG: 325 TABLET, FILM COATED ORAL at 11:40

## 2024-11-13 ASSESSMENT — ACTIVITIES OF DAILY LIVING (ADL)
ADLS_ACUITY_SCORE: 0

## 2024-11-13 NOTE — ED TRIAGE NOTES
Pt presents with headache behind the eye, hypertension, intermittent vision changes and sob.  Pt was directed to come to ED because she was having TIA.  /110  Kylie Heard RN.............................11/12/2024 7:08 PM       Triage Assessment (Adult)       Row Name 11/12/24 9705          Triage Assessment    Airway WDL WDL        Respiratory WDL    Respiratory WDL WDL        Skin Circulation/Temperature WDL    Skin Circulation/Temperature WDL WDL        Cardiac WDL    Cardiac WDL WDL        Peripheral/Neurovascular WDL    Peripheral Neurovascular WDL WDL        Cognitive/Neuro/Behavioral WDL    Cognitive/Neuro/Behavioral WDL WDL

## 2024-11-13 NOTE — PROGRESS NOTES
" NSG ADMISSION NOTE    Patient admitted to room 334 at approximately 2100 via wheel chair from emergency room. Patient was accompanied by transport tech.     Verbal SBAR report received from JOSEPH Rae prior to patient arrival.     Patient ambulated to bed with stand-by assist. Patient alert and oriented X 3. The patient is not having any pain.  . Admission vital signs: Blood pressure (!) 168/98, pulse 76, temperature 97.8  F (36.6  C), temperature source Tympanic, resp. rate 18, height 1.6 m (5' 3\"), weight 73.4 kg (161 lb 14.4 oz), last menstrual period 08/07/2006, SpO2 96%, not currently breastfeeding. Patient was oriented to plan of care, call light, bed controls, tv, telephone, bathroom, and visiting hours.     Risk Assessment    The following safety risks were identified during admission: fall. Yellow risk band applied: YES.          Education    Patient has a [place] to Observation order: Yes  Observation education completed and documented: Yes      Chitra Villanueva RN      "

## 2024-11-13 NOTE — PLAN OF CARE
"PRIMARY DIAGNOSIS: TIA  OUTPATIENT/OBSERVATION GOALS TO BE MET BEFORE DISCHARGE:  1. Orthostatic performed: Yes:          Lying Orthostatic BP: 168/97         Sitting Orthostatic BP: 182/110         Standing Orthostatic BP: 162/112     2. Diagnostic testing complete & at baseline neurologic testing: No    3. Cleared by consultants (if involved): No    4. Interpretation of cardiac rhythm per telemetry tech: NSR    5. Tolerating adequate PO diet and medications: Yes    6. Return to near baseline physical activity or neurologic status: Yes    Discharge Planner Nurse   Safe discharge environment identified: Yes  Barriers to discharge: Yes       Entered by: Chitra Villanueva RN 11/13/2024 1:06 AM     Please review provider order for any additional goals.   Nurse to notify provider when observation goals have been met and patient is ready for discharge.    BP (!) 147/82   Pulse 108   Temp 97.1  F (36.2  C) (Tympanic)   Resp 16   Ht 1.6 m (5' 3\")   Wt 73.4 kg (161 lb 14.4 oz)   LMP 08/07/2006 (Approximate)   SpO2 96%   BMI 28.68 kg/m      "

## 2024-11-13 NOTE — CONSULTS
"BRIEF VASCULAR NEUROLOGY NOTE      [facility]    Stroke Telephone Note    I was called by April Ramon on 11/12/24 regarding patient Juana Farrell. The patient is a 59 year old woman with 2 months of blurred/double vision, headache being currently evaluated by her ophthalmologist (no pathology noted on their exam) who advocated for MRI Brain. ED exam showed no focal deficits. Stroke team alerted about patient getting imaging done possibly by tomorrow AM and wanted us to be in the loop if imaging showed any ischemic/hemorrhagic lesion.    Vitals  BP: (!) 168/98   Pulse: 76   Resp: 18   Temp: 97.8  F (36.6  C)   Weight: 73.4 kg (161 lb 14.4 oz)    Imaging Findings  Not done yet    Impression  Headache x 2 months  Uncontrolled HTN x 2 months  Visual disturbances x 2 months    Recommendations  - Please call Stroke Neurology if imaging shows pathology    My recommendations are based on the information provided over the phone by Juana Farrell's in-person providers. They are not intended to replace the clinical judgment of her in-person providers. I was not requested to personally see or examine the patient at this time.     Venkata Moon MD  Vascular Neurology    To page me or covering stroke neurology team member, click here: [facility]  Choose \"On Call\" tab at top, then select \"NEUROLOGY/ALL SITES\" from middle drop-down box, press Enter, then look for \"stroke\" or \"telestroke\" for your site.   "

## 2024-11-13 NOTE — CARE PLAN
SAFETY CHECKLIST  ID Bands and Risk clasps correct and in place (DNR, Fall risk, Allergy, Latex, Limb):  Yes  All Lines Reconciled and labeled correctly: Yes  Whiteboard updated:Yes  Environmental interventions: Yes  Verify Tele #: [phone]

## 2024-11-13 NOTE — TELEPHONE ENCOUNTER
Per [facility]:      Patient was admitted to hospital and is getting MRI's done in hospital.     Bee Haq RN on 11/13/2024 at 2:40 PM

## 2024-11-13 NOTE — CARE PLAN
PRIMARY DIAGNOSIS: Headache/TIA  OUTPATIENT/OBSERVATION GOALS TO BE MET BEFORE DISCHARGE:  ADLs back to baseline: Yes    Activity and level of assistance: Ambulating independently.    Pain status: Improved-controlled with oral pain medications.    Return to near baseline physical activity: Yes     Discharge Planner Nurse   Safe discharge environment identified: Yes  Barriers to discharge: Yes       Entered by: Homer Nolen RN 11/13/2024 11:46 AM     Please review provider order for any additional goals.   Nurse to notify provider when observation goals have been met and patient is ready for discharge.

## 2024-11-13 NOTE — CARE PLAN
PRIMARY DIAGNOSIS: Headache/TIA  OUTPATIENT/OBSERVATION GOALS TO BE MET BEFORE DISCHARGE:  ADLs back to baseline: Yes    Activity and level of assistance: Up with standby assistance.    Pain status: Pain free with dull headache.    Return to near baseline physical activity: Yes     Discharge Planner Nurse   Safe discharge environment identified: Yes  Barriers to discharge: No       Entered by: Homer Nolen RN 11/13/2024 7:46 AM     Please review provider order for any additional goals.   Nurse to notify provider when observation goals have been met and patient is ready for discharge.

## 2024-11-13 NOTE — PROGRESS NOTES
11/12/24 2137   Valuables   Patient Belongings remains with patient   Patient Belongings Remaining with Patient clothing;cell phone/electronics;purse/wallet   Did you bring any home meds/supplements to the hospital?  No     A               Admission:  I am responsible for any personal items that are not sent to the safe or pharmacy.  [facility] is not responsible for loss, theft or damage of any property in my possession.    Signature:  _________________________________ Date: _______  Time: _____                                              Staff Signature:  ____________________________ Date: ________  Time: _____      2nd Staff person, if patient is unable/unwilling to sign:    Signature: ________________________________ Date: ________  Time: _____     Discharge:  [facility] has returned all of my personal belongings:    Signature: _________________________________ Date: ________  Time: _____                                          Staff Signature:  ____________________________ Date: ________  Time: _____

## 2024-11-13 NOTE — ED PROVIDER NOTES
"  History     Chief Complaint   Patient presents with    Stroke Symptoms     HPI  Juana Farrell is a 59 year old female who presents for concern related to TIA symptoms.  She has had an extensive workup recently including CT and follow-up with ophthalmology.  She has been trying to get into neurology.  It all started in August when she started having headaches which was new for her.  She has now been having headaches since August.  This is correlated with elevated blood pressures at home presumably though she is not completely certain  if her blood pressure is always elevated when she is having headaches that she is not checking at home.  She had a CT done last week.  Daughter is extremely concerned as the read was \"chronic supratentorial white matter signal changes are more advanced in degree than is typical and may reflect accelerated microvascular disease\".  MRI was recommended in the CT.  This has not yet been able to be approved by their insurance.  She has not yet gotten into neurology as she is waiting to schedule that appointment.  There have been no changes in her symptoms.  No nausea or vomiting.  No headache fevers or chills.  No numbness weakness or tingling or focal deficits.  Which she does complain of is when she was at the eye doctor for an evaluation she had \"double vision\".  She was told her eye exam was completely normal but that she needs to have an MRI as there could be something going on in her brain.    She was elevated here.  She took her medications at home today.    Allergies:  Allergies   Allergen Reactions    Amoxicillin Swelling     lips    Lisinopril Swelling    Atorvastatin Other (See Comments)     Bad stomach pain, nausea    Morphine Hives     Itchy rash at time of birth    Paroxetine Nausea       Problem List:    Patient Active Problem List    Diagnosis Date Noted    Tobacco use 08/08/2021     Priority: Medium    Anxiety 08/08/2021     Priority: Medium    Essential " hypertension 2019     Priority: Medium    Mixed hyperlipidemia 10/30/2018     Priority: Medium    Nicotine addiction 2018     Priority: Medium    Generalized anxiety disorder 2013     Priority: Medium    Chronic tension headaches 10/03/2012     Priority: Medium        Past Medical History:    Past Medical History:   Diagnosis Date    Acquired hallux valgus of left foot     Anxiety and depression     Benign essential hypertension 2019    Chronic tension headaches 10/03/2012    Dermatitis     Mixed hyperlipidemia 10/30/2018    Nicotine addiction 2018    Osteoarthritis     Otitis externa     Shingles        Past Surgical History:    Past Surgical History:   Procedure Laterality Date    BUNIONECTOMY Bilateral 2023    Dr Turner     SECTION      2 C-Sections    COLONOSCOPY      within normal limits.    ECTOPIC PREGNANCY SURGERY      Two ectopic pregnancies; with one the left tube removed    HYSTERECTOMY TOTAL ABDOMINAL  2009    Total abdominal hysterectomy by Dr. 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 Carter       Family History:    Family History   Problem Relation Age of Onset    Hypertension Mother     Heart Disease Mother     Heart Failure Mother     Cancer Father         Abrenathy's polyposis syndrome with father dying early of colon cancer.   at age 41 of Abernathy's polyposis with resulting colon cancer    No Known Problems Sister     Hypertension Brother     Colon Cancer Brother         Abernathy's    Colon Cancer Brother         Abernathy's    Diabetes Maternal Grandmother         Diabetes,FHMaternal diabetes in the family    No Known Problems Daughter     No Known Problems Daughter     Other - See Comments Other 0        Family history of Abernathy's polyposis syndrome, negative genetic testing  "      Social History:  Marital Status:   [5]  Social History     Tobacco Use    Smoking status: Former     Current packs/day: 0.00     Average packs/day: 0.3 packs/day for 19.7 years (4.9 ttl pk-yrs)     Types: Cigarettes     Start date: 2003     Quit date: 3/1/2023     Years since quittin.7    Smokeless tobacco: Never    Tobacco comments:     Started smoking at age 38.  Typically 1 pack a day but cutting back - working on quitting and is now down to about 5 per day.    Vaping Use    Vaping status: Never Used   Substance Use Topics    Alcohol use: No     Alcohol/week: 0.0 standard drinks of alcohol     Comment: Alcoholic Drinks/day: rare    Drug use: No     Comment:          Medications:    DULoxetine (CYMBALTA) 60 MG capsule  fluticasone furoate 27.5 MCG/SPRAY nasal spray  hydrochlorothiazide (HYDRODIURIL) 25 MG tablet  neomycin-polymyxin-hydrocortisone (CORTISPORIN) 3.5-23841-3 otic suspension  ofloxacin (FLOXIN) 0.3 % otic solution  valACYclovir (VALTREX) 1000 mg tablet  valsartan (DIOVAN) 160 MG tablet  varenicline (CHANTIX) 1 MG tablet          Review of Systems   Constitutional:  Positive for fever. Negative for fatigue.   Respiratory:  Positive for shortness of breath. Negative for cough, choking and chest tightness.    Cardiovascular:  Negative for chest pain, palpitations and leg swelling.   Gastrointestinal:  Negative for diarrhea, nausea and vomiting.   Neurological:  Positive for light-headedness and headaches. Negative for dizziness, tremors, seizures, syncope, facial asymmetry, speech difficulty, weakness and numbness.       Physical Exam   BP: (!) 196/110  Pulse: 90  Temp: 97.3  F (36.3  C)  Height: 157.5 cm (5' 2\")  Weight: 72.6 kg (160 lb)  SpO2: 95 %      Physical Exam  Constitutional:       General: She is not in acute distress.     Appearance: Normal appearance. She is not diaphoretic.   HENT:      Head: Normocephalic and atraumatic.      Mouth/Throat:      Mouth: Mucous membranes " are moist.   Eyes:      General: No scleral icterus.     Conjunctiva/sclera: Conjunctivae normal.   Cardiovascular:      Rate and Rhythm: Normal rate and regular rhythm.      Pulses: Normal pulses.      Heart sounds: Normal heart sounds. No murmur heard.  Pulmonary:      Effort: No respiratory distress.      Breath sounds: Normal breath sounds.   Abdominal:      General: Abdomen is flat.   Musculoskeletal:         General: No swelling.      Cervical back: Neck supple.   Skin:     General: Skin is warm.      Findings: No rash.   Neurological:      General: No focal deficit present.      Mental Status: She is alert and oriented to person, place, and time. Mental status is at baseline.      Cranial Nerves: No cranial nerve deficit.      Motor: No weakness.   Psychiatric:         Mood and Affect: Mood normal.         Behavior: Behavior normal.         ED Course     ED Course as of 11/12/24 2037 Tue Nov 12, 2024 1933 EKG: NSR. Rate 81. Normal intervals   2013 HA since August. Normal CT recently     Procedures              EKG Interpretation:      Interpreted by April Ramon DO  Time reviewed:   Symptoms at time of EKG:    Rhythm: normal sinus   Rate: normal  Axis: normal  Ectopy: none  Conduction: normal  ST Segments/ T Waves: No ST-T wave changes  Q Waves: none    Clinical Impression: normal EKG      Critical Care time:  none            Results for orders placed or performed during the hospital encounter of 11/12/24 (from the past 24 hours)   Glucose by meter   Result Value Ref Range    GLUCOSE BY METER POCT 87 70 - 99 mg/dL   CBC with Platelets & Differential    Narrative    The following orders were created for panel order CBC with Platelets & Differential.  Procedure                               Abnormality         Status                     ---------                               -----------         ------                     CBC with platelets and d...[[id number]]                      Final result                  Please view results for these tests on the individual orders.   Basic metabolic panel   Result Value Ref Range    Sodium 139 135 - 145 mmol/L    Potassium 3.7 3.4 - 5.3 mmol/L    Chloride 100 98 - 107 mmol/L    Carbon Dioxide (CO2) 26 22 - 29 mmol/L    Anion Gap 13 7 - 15 mmol/L    Urea Nitrogen 12.8 8.0 - 23.0 mg/dL    Creatinine 0.78 0.51 - 0.95 mg/dL    GFR Estimate 86 >60 mL/min/1.73m2    Calcium 9.2 8.8 - 10.4 mg/dL    Glucose 96 70 - 99 mg/dL   INR   Result Value Ref Range    INR 0.94 0.85 - 1.15   Partial thromboplastin time   Result Value Ref Range    aPTT 29 22 - 38 Seconds   Troponin T, High Sensitivity   Result Value Ref Range    Troponin T, High Sensitivity 6 <=14 ng/L   CBC with platelets and differential   Result Value Ref Range    WBC Count 10.0 4.0 - 11.0 10e3/uL    RBC Count 4.71 3.80 - 5.20 10e6/uL    Hemoglobin 13.9 11.7 - 15.7 g/dL    Hematocrit 42.0 35.0 - 47.0 %    MCV 89 78 - 100 fL    MCH 29.5 26.5 - 33.0 pg    MCHC 33.1 31.5 - 36.5 g/dL    RDW 13.4 10.0 - 15.0 %    Platelet Count 418 150 - 450 10e3/uL    % Neutrophils 65 %    % Lymphocytes 25 %    % Monocytes 8 %    % Eosinophils 1 %    % Basophils 0 %    % Immature Granulocytes 0 %    NRBCs per 100 WBC 0 <1 /100    Absolute Neutrophils 6.4 1.6 - 8.3 10e3/uL    Absolute Lymphocytes 2.5 0.8 - 5.3 10e3/uL    Absolute Monocytes 0.8 0.0 - 1.3 10e3/uL    Absolute Eosinophils 0.1 0.0 - 0.7 10e3/uL    Absolute Basophils 0.0 0.0 - 0.2 10e3/uL    Absolute Immature Granulocytes 0.0 <=0.4 10e3/uL    Absolute NRBCs 0.0 10e3/uL   Nt probnp inpatient (BNP)   Result Value Ref Range    N terminal Pro BNP Inpatient <36 0 - 900 pg/mL       Medications   hydrALAZINE (APRESOLINE) injection 20 mg (has no administration in time range)   sodium chloride 0.9 % bag 500 mL for CT scan flush use (70 mLs Intravenous $Given 11/12/24 2021)   iopamidol (ISOVUE-370) solution 75 mL (75 mLs Intravenous $Given 11/12/24 2020)       Assessments & Plan (with Medical  Decision Making)     I have reviewed the nursing notes.    I have reviewed the findings, diagnosis, plan and need for follow up with the patient.           Medical Decision Making  The patient's presentation was of moderate complexity (an undiagnosed new problem with uncertain prognosis).    The patient's evaluation involved:  review of external note(s) from 3+ sources (see separate area of note for details)  review of 3+ test result(s) ordered prior to this encounter (see separate area of note for details)  ordering and/or review of 3+ test(s) in this encounter (see separate area of note for details)    The patient's management necessitated high risk (a decision regarding hospitalization).        New Prescriptions    No medications on file       Final diagnoses:   Nonintractable headache, unspecified chronicity pattern, unspecified headache type   Secondary hypertension   Patient with headache for the last 2 to 3 months.  No new acute changes today.  Focal neurologic deficits.  She has been seen by an eye doctor and referred to neurology.  There is some concern about the CT read that the patient had last week and they have been working to get in for follow-up.  Additionally, they were concerned because her blood pressure has been elevated despite use of her blood pressure medication at home.  I think her symptoms are likely hypertensive urgency and her headaches and visual changes are likely related to uncontrolled blood pressure.  She could benefit from frequent monitoring throughout the day.  However, given that she does have elevated blood pressure and what was described as double vision and visual changes when she was seen at the eye doctor last week we will admit her to observation overnight.  MRI with orbits ordered for the morning.  EKG normal sinus rhythm.  Remainder of lab work unremarkable.  She did also complain of shortness of breath.  Chest x-ray, BNP and troponin negative.  This could also be related  to elevated blood pressure.  Consider echocardiogram in the morning if appropriate.  I have discussed the case with Dr. Peoples who has accepted patient to observation.    11/12/2024   [facility]       April Ramon DO  11/12/24 2039

## 2024-11-13 NOTE — PROVIDER NOTIFICATION
MD Peoples notified of orthostatics, pt asymptomatic, no new orders.             11/12/24 2122 11/12/24 2124 11/12/24 2126   Lying Orthostatic BP   Lying Orthostatic /97  --   --    Lying Orthostatic Pulse 78 bpm  --   --    Sitting Orthostatic BP   Sitting Orthostatic BP  --  182/110  --    Sitting Orthostatic Pulse  --  82 bpm  --    Standing Orthostatic BP   Standing Orthostatic BP  --   --  162/112   Standing Orthostatic Pulse  --   --  86 bpm

## 2024-11-13 NOTE — H&P
"[facility]    History and Physical - Hospitalist Service       Date of Admission:  11/12/2024    Assessment & Plan    Headache  -CT/CTA head: no acute findings  -MRI brain in AM  -Neuro: followup after imaging if acute findings noted  -hold Chantix (can cause HA)  -Tylenol prn  -needs better BP control    HTN, uncontrolled  -resume home meds-hydrochlorothiazide and valsartan  -hydralazine IV prn; last /80    Anxiety  -continue duloxetine    Tobacco abuse        Diet: Combination Diet Regular Diet    DVT Prophylaxis: scds  Pastor Catheter: Not present  Lines: None     Code Status:  FULL    Clinically Significant Risk Factors Present on Admission     # Hypertension: Noted on problem list           # Overweight: Estimated body mass index is 28.68 kg/m  as calculated from the following:    Height as of this encounter: 1.6 m (5' 3\").    Weight as of this encounter: 73.4 kg (161 lb 14.4 oz).              Disposition Plan      Expected Discharge Date: 11/13/2024                The patient's care was discussed with the Patient.        PURVI MCADAMS MD  [facility]  Securely message with the Vocera Web Console (learn more here)  Text page via [facility] Paging/Directory      Visit/Communication Style   Virtual (Video) communication was used to evaluate Juana.  Juana consented to the use of video communication: yes  Video START time: 2200, 11/12/2024  Video STOP time: 2215, 11/12/2024   Patient's location: [facility]   Provider's location during the visit: [facility] Tele-medicine site        ______________________________________________________________________    Chief Complaint   Headache    History of Present Illness   59yoF with HTN, anxiety, tobacco abuse, and prior herpes zoster affecting left eye presented to the ED with headache acute on chronic headache.  This has been going on intermittently since August.  Her current HARTMANN began two days ago.  It " is a squeezing pain across her forehead.  This has been associated with blurry vision and double vision in the past although today her vision is normal.  She has been evaluated by her PCP and optometrist for these symptoms.  She had a recent head CT with microvascular disease advanced for her age.  Otherwise workup has been unremarkable.  MRI had been recommended however insurance approval has been an issue it seems.  She is also pending Neurology referral.  She says she was told to come to the hospital if her BP was high or if she had more HA.  Her BP at home today was 160/110s so she came in. She reports taking her BP meds as prescribed.    She denies nausea, vomiting, fever, chills, numbness, weakness.      Review of Systems    General: negative for fever, chills, sweats, weakness  Eyes: negative for loss of vision  Ear Nose and Throat: negative for pharyngitis, speech or swallowing difficulties  Respiratory:  negative for sputum production, wheezing, BOOTH, pleuritic pain, sob or cough  Cardiology:  negative for chest pain, palpitations, orthopnea, PND, edema, syncope   Gastrointestinal: negative for abdominal pain, nausea, vomiting, diarrhea, constipation, hematemesis, melena or hematochezia  Genitourinary: negative for frequency, urgency, dysuria, hematuria   Neurological: negative for focal weakness, paresthesia    Past Medical History    I have reviewed this patient's medical history and updated it with pertinent information if needed.   Past Medical History:   Diagnosis Date    Acquired hallux valgus of left foot     Bilateral hallux valgus    Anxiety and depression 1995    Benign essential hypertension 12/03/2019    Chronic tension headaches 10/03/2012    Dermatitis     Dermatitis of the ear canals    Mixed hyperlipidemia 10/30/2018    Nicotine addiction 01/18/2018    Osteoarthritis     Otitis externa     Shingles 2021       Past Surgical History   I have reviewed this patient's surgical history and updated  it with pertinent information if needed.  Past Surgical History:   Procedure Laterality Date    BUNIONECTOMY Bilateral 2023    Dr Turner     SECTION      2 C-Sections    COLONOSCOPY      within normal limits.    ECTOPIC PREGNANCY SURGERY      Two ectopic pregnancies; with one the left tube removed    HYSTERECTOMY TOTAL ABDOMINAL  2009    Total abdominal hysterectomy by Dr. 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 Carter       Social History   I have reviewed this patient's social history and updated it with pertinent information if needed.  Social History     Tobacco Use    Smoking status: Former     Current packs/day: 0.00     Average packs/day: 0.3 packs/day for 19.7 years (4.9 ttl pk-yrs)     Types: Cigarettes     Start date: 2003     Quit date: 3/1/2023     Years since quittin.7    Smokeless tobacco: Never    Tobacco comments:     Started smoking at age 38.  Typically 1 pack a day but cutting back - working on quitting and is now down to about 5 per day.    Vaping Use    Vaping status: Never Used   Substance Use Topics    Alcohol use: No     Alcohol/week: 0.0 standard drinks of alcohol     Comment: Alcoholic Drinks/day: rare    Drug use: No     Comment:         Family History   I have reviewed this patient's family history and updated it with pertinent information if needed.  Family History   Problem Relation Age of Onset    Hypertension Mother     Heart Disease Mother     Heart Failure Mother     Cancer Father         Abernathy's polyposis syndrome with father dying early of colon cancer.   at age 41 of Abernathy's polyposis with resulting colon cancer    No Known Problems Sister     Hypertension Brother     Colon Cancer Brother         Abernathy's    Colon Cancer Brother         Abernathy's    Diabetes Maternal Grandmother          Diabetes,FHMaternal diabetes in the family    No Known Problems Daughter     No Known Problems Daughter     Other - See Comments Other 0        Family history of Abernathy's polyposis syndrome, negative genetic testing       Prior to Admission Medications   Prior to Admission Medications   Prescriptions Last Dose Informant Patient Reported? Taking?   DULoxetine (CYMBALTA) 60 MG capsule   No No   Sig: TAKE 1 CAPSULE(60 MG) BY MOUTH DAILY   fluticasone furoate 27.5 MCG/SPRAY nasal spray   No No   Sig: Spray 2 sprays into both nostrils daily.   hydrochlorothiazide (HYDRODIURIL) 25 MG tablet   No No   Sig: TAKE 1 TABLET(25 MG) BY MOUTH EVERY MORNING   neomycin-polymyxin-hydrocortisone (CORTISPORIN) 3.5-78602-2 otic suspension   No No   Sig: Place 4 drops into both ears 3 times daily as needed (ear drainage/itching)   ofloxacin (FLOXIN) 0.3 % otic solution   No No   Sig: Place 5 drops into both ears 2 times daily.   valACYclovir (VALTREX) 1000 mg tablet   No No   Sig: Take 1 tablet (1,000 mg) by mouth 2 times daily as needed (flare of shingles).   valsartan (DIOVAN) 160 MG tablet   No No   Sig: TAKE 1 TABLET(160 MG) BY MOUTH EVERY EVENING   varenicline (CHANTIX) 1 MG tablet   No No   Sig: Take 1 tablet (1 mg) by mouth 2 times daily.      Facility-Administered Medications: None     Allergies   Allergies   Allergen Reactions    Amoxicillin Swelling     lips    Lisinopril Swelling    Atorvastatin Other (See Comments)     Bad stomach pain, nausea    Morphine Hives     Itchy rash at time of birth    Paroxetine Nausea       Physical Exam   Vital Signs: Temp: 97.8  F (36.6  C) Temp src: Tympanic BP: (!) 168/98 Pulse: 76   Resp: 18 SpO2: 96 % O2 Device: None (Room air)    Weight: 161 lbs 14.4 oz    Gen:  Well-developed, well-nourished, in no acute distress, lying semi-supine in hospital stretcher  HEENT:  Anicteric sclera, PER, hearing intact to voice  Resp:  No accessory muscle use, breath sounds clear; no wheezes no rales  no rhonchi  Card:  No murmur, normal S1, S2   Abd:  Soft per RN exam, no TTP, non-distended, normoactive bowel sounds are present  Musc:  Normal strength and movement of the major muscle groups without obvious deformity  Psych:  Good insight, oriented to person, place and time, not anxious, not agitated    Data     Recent Labs   Lab 11/12/24 1917 11/12/24 1915   WBC 10.0  --    HGB 13.9  --    MCV 89  --      --    INR 0.94  --      --    POTASSIUM 3.7  --    CHLORIDE 100  --    CO2 26  --    BUN 12.8  --    CR 0.78  --    ANIONGAP 13  --    MARIE 9.2  --    GLC 96 87         Recent Results (from the past 24 hours)   CT Head w/o Contrast    Narrative    PROCEDURE INFORMATION:   Exam: CT Head Without Contrast   Exam date and time: 11/12/2024 8:08 PM   Age: 59 years old   Clinical indication: Other: Acute neuro deficit, stroke/tia suspected     TECHNIQUE:   Imaging protocol: Computed tomography of the head without contrast.   Radiation optimization: All CT scans at this facility use at least one of these   dose optimization techniques: automated exposure control; mA and/or kV   adjustment per patient size (includes targeted exams where dose is matched to   clinical indication); or iterative reconstruction.     COMPARISON:   CT head without contrast 11/07/2024 12:59 p.m.     FINDINGS:   Brain: Generalized parenchymal atrophy present. Probable chronic ischemic   gliotic microangiopathic leukoaraiosis white matter changes present within the   brain. No acute subarachnoid or parenchymal hemorrhage. No abnormal   intracranial mass lesion, midline shift or mass effect. No acute subdural or   epidural hematoma. No definite acute cerebral or cerebellar infarct.   Cerebral ventricles: No hydrocephalus. No acute intraventricular hemorrhage.   Paranasal sinuses: No acute sinusitis.   Mastoid air cells: Unremarkable.   Bones: No acute calvarial fracture.   Soft tissues: Unremarkable.   Vasculature: Arterial  atheromatous vascular calcifications present.       Impression    IMPRESSION:   1.   No acute intracranial abnormality.   2.   Correlation with the more sensitive and specific imaging modality,   diffusion weighted MR, would be useful for further evaluation of an acute   infarct as indicated clinically.  Remainder of findings described as above.    THIS DOCUMENT HAS BEEN ELECTRONICALLY SIGNED BY JOANNA RODRIGUEZ MD   XR Chest Port 1 View    Narrative    PROCEDURE INFORMATION:   Exam: XR Chest   Exam date and time: 11/12/2024 8:19 PM   Age: 59 years old   Clinical indication: Shortness of breath; Additional info: Short of breath     TECHNIQUE:   Imaging protocol: Radiologic exam of the chest.   Views: 1 view.     COMPARISON:   No prior studies available.     FINDINGS:   Tubes, catheters and devices: Overlying EKG leads.   Lungs: Poor inspiratory effort with secondary hypoventilatory changes and   perihilar/infrahilar vascular crowding. No acute focal dense air space   consolidation or lung parenchymal mass.   Pleural spaces: No pneumothorax. No large right pleural effusion. No large left   pleural effusion.   Heart/Mediastinum: Somewhat elevated left hemidiaphragm with mild right   mediastinal shift. Prominent cardiomediastinal silhouette.   Bones/joints: No acute fracture or neoplastic osseous lesion.       Impression    IMPRESSION:   1.   No active cardiopulmonary disaese.   2.   Remainder of findings described as above.     THIS DOCUMENT HAS BEEN ELECTRONICALLY SIGNED BY JOANNA RODRIGUEZ MD

## 2024-11-13 NOTE — PHARMACY-ADMISSION MEDICATION HISTORY
Pharmacist Admission Medication History    Admission medication history is complete. The information provided in this note is only as accurate as the sources available at the time of the update.    Information Source(s): Patient and CareEverywhere/SureScripts via in-person    Pertinent Information: Patient stopped chantix 3 weeks prior to admission- no reported side effects. Spoke well on medications.    Changes made to PTA medication list:  Added: None  Deleted: Ofloxacin, fluticasone, chantix  Changed: None    Allergies reviewed with patient and updates made in EHR: yes    Medication History Completed By: Cecelia Perales RPH 11/13/2024 11:55 AM    PTA Med List   Medication Sig Last Dose/Taking    DULoxetine (CYMBALTA) 60 MG capsule TAKE 1 CAPSULE(60 MG) BY MOUTH DAILY 11/12/2024 Morning    hydrochlorothiazide (HYDRODIURIL) 25 MG tablet TAKE 1 TABLET(25 MG) BY MOUTH EVERY MORNING 11/12/2024 Morning    neomycin-polymyxin-hydrocortisone (CORTISPORIN) 3.5-62881-7 otic suspension Place 4 drops into both ears 3 times daily as needed (ear drainage/itching) Past Month    valACYclovir (VALTREX) 1000 mg tablet Take 1 tablet (1,000 mg) by mouth 2 times daily as needed (flare of shingles). Past Month    valsartan (DIOVAN) 160 MG tablet TAKE 1 TABLET(160 MG) BY MOUTH EVERY EVENING 11/11/2024 Evening

## 2024-11-13 NOTE — PLAN OF CARE
"A&O x4,neuro's intact, did have dull frontal headache, now resolved. B/p's elevated at admission, prn hydralazine given along with scheduled htn medication, now wdl.   /82   Pulse 85   Temp 97.2  F (36.2  C) (Tympanic)   Resp 16   Ht 1.6 m (5' 3\")   Wt 73.4 kg (161 lb 14.4 oz)   LMP 08/07/2006 (Approximate)   SpO2 96%   BMI 28.68 kg/m      PRIMARY DIAGNOSIS: TIA  OUTPATIENT/OBSERVATION GOALS TO BE MET BEFORE DISCHARGE:  1. Orthostatic performed: Yes:          Lying Orthostatic BP: 168/97         Sitting Orthostatic BP: 182/110         Standing Orthostatic BP: 162/112     2. Diagnostic testing complete & at baseline neurologic testing: No    3. Cleared by consultants (if involved): No    4. Interpretation of cardiac rhythm per telemetry tech: NSR    5. Tolerating adequate PO diet and medications: Yes    6. Return to near baseline physical activity or neurologic status: Yes    Discharge Planner Nurse   Safe discharge environment identified: Yes  Barriers to discharge: Yes       Entered by: Chitra Villanueva RN 11/13/2024 5:12 AM     Please review provider order for any additional goals.   Nurse to notify provider when observation goals have been met and patient is ready for discharge.  "

## 2024-11-14 ENCOUNTER — PATIENT OUTREACH (OUTPATIENT)
Dept: FAMILY MEDICINE | Facility: OTHER | Age: 59
End: 2024-11-14
Payer: COMMERCIAL

## 2024-11-14 LAB
ATRIAL RATE - MUSE: 81 BPM
DIASTOLIC BLOOD PRESSURE - MUSE: NORMAL MMHG
INTERPRETATION ECG - MUSE: NORMAL
P AXIS - MUSE: 49 DEGREES
PR INTERVAL - MUSE: 150 MS
QRS DURATION - MUSE: 92 MS
QT - MUSE: 402 MS
QTC - MUSE: 466 MS
R AXIS - MUSE: 55 DEGREES
SYSTOLIC BLOOD PRESSURE - MUSE: NORMAL MMHG
T AXIS - MUSE: 26 DEGREES
VENTRICULAR RATE- MUSE: 81 BPM

## 2024-11-14 RX ORDER — METOPROLOL SUCCINATE 25 MG/1
25 TABLET, EXTENDED RELEASE ORAL DAILY
Qty: 90 TABLET | Refills: 0 | Status: SHIPPED | OUTPATIENT
Start: 2024-11-14

## 2024-11-14 NOTE — TELEPHONE ENCOUNTER
[facility] is requesting quantity 90 of metoprolol succinate.  It was initially ordered on Med/Surg by Dr. Danielle  for quantity 30 on 11/13/24.     Jim sent Rx request for the following:      Requested Prescriptions   Pending Prescriptions Disp Refills    metoprolol succinate ER (TOPROL XL) 25 MG 24 hr tablet [Pharmacy Med Name: METOPROLOL ER SUCCINATE 25MG TABS] 90 tablet      Sig: TAKE 1 TABLET(25 MG) BY MOUTH DAILY       Beta-Blockers Protocol Failed - 11/14/2024 10:52 AM        Failed - Recent (12 mo) or future (90 days) visit within the authorizing provider's specialty     The patient must have completed an in-person or virtual visit within the past 12 months or has a future visit scheduled within the next 90 days with the authorizing provider s specialty.  Urgent care and e-visits do not quality as an office visit for this protocol.       Last Prescription Date:   11/13/24  Last Fill Qty/Refills:         30, R-0    Last Office Visit:              11/13/24 (Shashi, Med/Surg)   Future Office visit:             Next 5 appointments (look out 90 days)      Nov 18, 2024 8:30 AM  (Arrive by 8:15 AM)  Return Visit with Braeden Salazar MD  [facility] ([facility] ) [address]  [place] [id number] [phone]           Unable to complete prescription refill per RN Medication Refill Policy. Will route to the covering provider.   Mihaela Nash RN on 11/14/2024 at 11:00 AM

## 2024-11-14 NOTE — TELEPHONE ENCOUNTER
Transitions of Care Outreach  Chief Complaint   Patient presents with    Hospital F/U       Most Recent Admission Date: 11/12/2024   Most Recent Admission Diagnosis: Secondary hypertension - I15.9  Nonintractable headache, unspecified chronicity pattern, unspecified headache type - R51.9     Most Recent Discharge Date: 11/13/2024   Most Recent Discharge Diagnosis: Nonintractable headache, unspecified chronicity pattern, unspecified headache type - R51.9  Secondary hypertension - I15.9  Shortness of breath - R06.02  Vision changes - H53.9  Essential hypertension - I10  Intractable headache, unspecified chronicity pattern, unspecified headache type - R51.9  Other headache syndrome - G44.89  Mixed hyperlipidemia - E78.2  Generalized anxiety disorder - F41.1     Transitions of Care Assessment    Discharge Assessment  How are you doing now that you are home?: Doing okay. No headache. Blood pressure has been staying down.  How are your symptoms? (Red Flag symptoms escalate to triage hotline per guidelines): Improved  Do you know how to contact your clinic care team if you have future questions or changes to your health status? : Yes  Does the patient have their discharge instructions? : Yes  Does the patient have questions regarding their discharge instructions? : No  Were you started on any new medications or were there changes to any of your previous medications? : Yes  Does the patient have all of their medications?: Yes  Do you have questions regarding any of your medications? : No  Do you have all of your needed medical supplies or equipment (DME)?  (i.e. oxygen tank, CPAP, cane, etc.): Yes    Follow up Plan     Discharge Follow-Up  Discharge follow up appointment scheduled in alignment with recommended follow up timeframe or Transitions of Risk Category? (Low = within 30 days; Moderate= within 14 days; High= within 7 days): Yes  Discharge Follow Up Appointment Date: 11/20/24  Discharge Follow Up Appointment  Scheduled with?: Primary Care Provider    Future Appointments   Date Time Provider Department Center   11/18/2024  8:30 AM Braeden Salazar MD [facility]   11/20/2024 11:00 AM María Meier NP [facility]   11/27/2024  3:00 PM [facility] [place]   10/27/2025  9:20 AM Olya Loomis,  [facility] [place]       Outpatient Plan as outlined on AVS reviewed with patient.    For any urgent concerns, please contact our 24 hour nurse triage line: [phone] ([phone])       Mihaela Nash RN

## 2024-11-14 NOTE — PROGRESS NOTES
[facility] DISCHARGE NOTE    Patient discharged to home at 10:02 PM via ambulation. Accompanied by daughter and staff. Discharge instructions reviewed with patient and daughter, opportunity offered to ask questions. Prescriptions sent to patients preferred pharmacy. All belongings sent with patient.    Chitra Villanueva RN

## 2024-11-14 NOTE — DISCHARGE SUMMARY
"[facility]  Hospitalist Discharge Summary      Date of Admission:  11/12/2024  Date of Discharge:  11/13/2024  Discharging Provider: Zenia Danielle MD  Discharge Service: Hospitalist Service    Discharge Diagnoses   Principal Problem:    Other headache syndrome    Date Noted: 11/12/2024  Active Problems:    Generalized anxiety disorder    Date Noted: 1/22/2013    Mixed hyperlipidemia    Date Noted: 10/30/2018    Essential hypertension    Date Noted: 12/3/2019      Clinically Significant Risk Factors     # Overweight: Estimated body mass index is 28.68 kg/m  as calculated from the following:    Height as of this encounter: 1.6 m (5' 3\").    Weight as of this encounter: 73.4 kg (161 lb 14.4 oz).       Follow-ups Needed After Discharge   Follow-up Appointments       Follow-up and recommended labs and tests       Follow up with primary care provider, Olya Loomis, within 7 days for hospital follow- up.  No follow up labs or test are needed.  Follow-up on neurology referral.                Unresulted Labs Ordered in the Past 30 Days of this Admission       No orders found for last 31 day(s).        These results will be followed up by PCP.    Discharge Disposition   Discharged to home  Condition at discharge: Good    Hospital Course   This 60 yo female with a PMH significant for HTN, hyperlipidemia, anxiety and chronic tension HA's presented to the ER with complaints of a HA behind her eyes, HTN, intermittent vision changes and shortness of breath. Has been having symptoms since August and is concerned that she may be having TIA's as a CT scan recently showed \"chronic supratentorial white matter signal changes are more advanced in degree than is typical and may reflect accelerated microvascular disease.\"  It was recommended she have an MRI and be referred to neurology but the MRI has not been approved through her insurance yet and she cannot get into neurology until March.  Her BP has been more " "elevated since she had to change from lisinopril to valsartan due to lip swelling while on lisinopril.  No new symptoms and has not had any consistent stroke-like symptoms but has had a few \"weird\" symptoms she attributed to other causes.  Was able to have her MRI which shows no acute changes and is ready to discharge now.    Principal Problem:    Other headache syndrome    Assessment: Stable    Plan: No acute findings on MRI but there are some abnormalities.  Her PCP has been trying to get her a neurology appointment so should follow-up for further recommendations.    Active Problems:    Generalized anxiety disorder    Assessment: Stable    Plan: Has a long hx of HA's but these are more concerning for a possible stroke.  Also her CT was not completely normal with findings consistent with MRI findings of chronic microvascular changes.  Follow-up with her PCP as previously planned.      Mixed hyperlipidemia    Assessment: Not controlled    Plan: Lipid panel from 11/12/2024 with total chol 237, HDL 55,  and .  Not on any medications at this time but if she is worried about stroke she should be on a statin.  Had stomach upset from atorvastatin so should discuss starting a different statin, such as rosuvastatin, with her PCP.  Also start ASA 81 mg daily for stroke prevention.      Essential hypertension    Assessment: Stable    Plan: Has been slightly elevated since changing from lisinopril to valsartan.  Will add Toprol XL 25 mg daily to her valsartan 160 mg and hydrochlorothiazide 25 mg daily.  Follow-up with her PCP for further recommendations.    Consultations This Hospital Stay   NEUROLOGY IP STROKE CONSULT  NEUROLOGY IP STROKE CONSULT  SMOKING CESSATION PROGRAM IP CONSULT    Code Status   Full Code    Time Spent on this Encounter   I, Zenia Danielle MD, personally saw the patient today and spent greater than 30 minutes discharging this patient.       Zenia Danielle MD  [facility] AND " [facility]  [address]  [place] [address]  Phone: [phone]  Fax: [phone]  ______________________________________________________________________    Physical Exam   Vital Signs: Temp: 98.6  F (37  C) Temp src: Tympanic BP: 129/70 Pulse: 76   Resp: 18 SpO2: 96 % O2 Device: None (Room air)    Weight: 161 lbs 14.4 oz  Constitutional: awake, alert, cooperative, no apparent distress, appears stated age, and normal weight  Eyes: pupils equal, round and reactive to light, extra-ocular muscles intact, and conjunctiva normal  ENT: normocephalic, atraumatic  Respiratory: no increased work of breathing, good air exchange, and clear to auscultation  Cardiovascular: regular rate and rhythm, normal S1 and S2, no murmur noted, and no edema  GI: normal bowel sounds, soft, non-distended, and non-tender  Skin: normal skin color, texture, turgor and no redness, warmth, or swelling  Musculoskeletal: no lower extremity pitting edema present  there is no redness, warmth, or swelling of the joints  full range of motion noted  Neurologic: Mental Status Exam:  Fund of Knowledge:  normal  Attention/Concentration:  normal  Language:  normal  Cranial Nerves:  cranial nerves II-XII are grossly intact  Motor Exam:  Motor exam is symmetrical 5 out of 5 all extremities bilaterally  Neuropsychiatric: General: normal and normal eye contact  Level of consciousness: alert / normal  Affect: normal  Orientation: oriented to self, place, time and situation  Memory and insight: normal, memory for past and recent events intact, and thought process normal       Primary Care Physician   Olya Loomis    Discharge Orders      Reason for your hospital stay    Headache     Follow-up and recommended labs and tests     Follow up with primary care provider, Olya Loomis, within 7 days for hospital follow- up.  No follow up labs or test are needed.  Follow-up on neurology referral.     Activity    Your activity upon discharge: activity as  tolerated     Full Code     Diet    Follow this diet upon discharge: Current Diet:Orders Placed This Encounter      Combination Diet Regular Diet       Significant Results and Procedures   Most Recent 3 CBC's:  Recent Labs   Lab Test 11/12/24  1917 10/24/24  0930 11/29/23  1304   WBC 10.0 6.3 8.3   HGB 13.9 13.8 14.4   MCV 89 89 88    355 351     Most Recent 3 BMP's:  Recent Labs   Lab Test 11/13/24  1656 11/13/24  1125 11/13/24  0734 11/12/24  2234 11/12/24  1917 11/12/24  1915 10/24/24  0930 11/29/23  1304   NA  --   --   --   --  139  --  139 138   POTASSIUM  --   --   --   --  3.7  --  3.9 3.6   CHLORIDE  --   --   --   --  100  --  102 100   CO2  --   --   --   --  26  --  29 27   BUN  --   --   --   --  12.8  --  13.0 14.0   CR  --   --   --   --  0.78  --  0.93 0.79   ANIONGAP  --   --   --   --  13  --  8 11   MARIE  --   --   --   --  9.2  --  9.1 9.7   GLC 78 81 96   < > 96   < > 85 92    < > = values in this interval not displayed.     Most Recent 2 LFT's:  Recent Labs   Lab Test 10/24/24  0930 06/05/23  0913   AST 22 17   ALT 23 14   ALKPHOS 108 94   BILITOTAL 0.4 <0.2     Most Recent 3 Troponin's:No lab results found.  Most Recent Cholesterol Panel:  Recent Labs   Lab Test 11/12/24 1917   CHOL 237*   *   HDL 54   TRIG 174*     Most Recent TSH and T4:  Recent Labs   Lab Test 10/24/24  0930   TSH 2.29     Most Recent Hemoglobin A1c:  Recent Labs   Lab Test 10/24/24  0930   A1C 5.4   ,   Results for orders placed or performed during the hospital encounter of 11/12/24   CT Head w/o Contrast    Narrative    PROCEDURE INFORMATION:   Exam: CT Head Without Contrast   Exam date and time: 11/12/2024 8:08 PM   Age: 59 years old   Clinical indication: Other: Acute neuro deficit, stroke/tia suspected     TECHNIQUE:   Imaging protocol: Computed tomography of the head without contrast.   Radiation optimization: All CT scans at this facility use at least one of these   dose optimization techniques:  automated exposure control; mA and/or kV   adjustment per patient size (includes targeted exams where dose is matched to   clinical indication); or iterative reconstruction.     COMPARISON:   CT head without contrast 11/07/2024 12:59 p.m.     FINDINGS:   Brain: Generalized parenchymal atrophy present. Probable chronic ischemic   gliotic microangiopathic leukoaraiosis white matter changes present within the   brain. No acute subarachnoid or parenchymal hemorrhage. No abnormal   intracranial mass lesion, midline shift or mass effect. No acute subdural or   epidural hematoma. No definite acute cerebral or cerebellar infarct.   Cerebral ventricles: No hydrocephalus. No acute intraventricular hemorrhage.   Paranasal sinuses: No acute sinusitis.   Mastoid air cells: Unremarkable.   Bones: No acute calvarial fracture.   Soft tissues: Unremarkable.   Vasculature: Arterial atheromatous vascular calcifications present.       Impression    IMPRESSION:   1.   No acute intracranial abnormality.   2.   Correlation with the more sensitive and specific imaging modality,   diffusion weighted MR, would be useful for further evaluation of an acute   infarct as indicated clinically.  Remainder of findings described as above.    THIS DOCUMENT HAS BEEN ELECTRONICALLY SIGNED BY JOANNA RODRIGUEZ MD   CTA Head Neck with Contrast    Narrative    PROCEDURE INFORMATION:   Exam: CTA Head With Contrast, Arteriography   Exam date and time: 11/12/2024 8:08 PM   Age: 59 years old   Clinical indication: Other: Acute neuro deficit, stroke/tia suspected     TECHNIQUE:   Imaging protocol: Computed tomographic angiography of the head with contrast.   Exam focused on the arteries.   3D rendering (Not supervised by radiologist): MIP and/or 3D reconstructed   images were created by the technologist.   Radiation optimization: All CT scans at this facility use at least one of these   dose optimization techniques: automated exposure control; mA and/or kV    adjustment per patient size (includes targeted exams where dose is matched to   clinical indication); or iterative reconstruction.   Contrast material: ISOVUE 370; Contrast volume: 75 ml; Contrast route:   INTRAVENOUS (IV);      COMPARISON:   CT HEAD W/O CONTRAST 11/12/2024 8:08 PM     FINDINGS:     ANTERIOR CIRCULATION:   Right internal carotid artery: Intracranial segment is patent with no   significant stenosis. No aneurysm.   Right middle cerebral artery: No occlusion or significant stenosis. No   aneurysm.    Right anterior cerebral artery: No occlusion or significant stenosis. No   aneurysm.      Left internal carotid artery: Intracranial segment is patent with no   significant stenosis. No aneurysm.   Left middle cerebral artery: No occlusion or significant stenosis. No aneurysm.      Left anterior cerebral artery: No occlusion or significant stenosis. No   aneurysm.      POSTERIOR CIRCULATION:   Right vertebral artery: No occlusion or significant stenosis. No aneurysm.    Left vertebral artery: No occlusion or significant stenosis. No aneurysm.    Basilar artery: No occlusion or significant stenosis. No aneurysm.   Right posterior cerebral artery: No occlusion or significant stenosis. No   aneurysm.    Left posterior cerebral artery: No occlusion or significant stenosis. No   aneurysm.      Brain: No definite mass, mass effect, or midline shift.   Cerebral ventricles: No ventriculomegaly.   Bones/joints: Unremarkable. No acute fracture.   Soft tissues: Unremarkable.       Impression    IMPRESSION:   No large vessel occlusion. No obvious aneurysm.       =========================  PROCEDURE INFORMATION:   Exam: CTA Neck With Contrast   Exam date and time: 11/12/2024 8:08 PM   Age: 59 years old   Clinical indication: Other: Acute neuro deficit, stroke/tia suspected     TECHNIQUE:   Imaging protocol: Computed tomographic angiography of the neck with contrast.   Exam focused on the cervical segments of the  vasculature.   3D rendering (Not supervised by radiologist): MIP and/or 3D reconstructed   images were created by the technologist.   Radiation optimization: All CT scans at this facility use at least one of these   dose optimization techniques: automated exposure control; mA and/or kV   adjustment per patient size (includes targeted exams where dose is matched to   clinical indication); or iterative reconstruction.   Contrast material: ISOVUE 370; Contrast volume: 75 ml; Contrast route:   INTRAVENOUS (IV);      COMPARISON:   CT HEAD W/O CONTRAST 11/12/2024 8:08 PM     FINDINGS:   Right common carotid artery: No stenosis. No dissection or occlusion.   Right internal carotid artery: No stenosis of the extracranial segment. No   dissection or occlusion.   Right external carotid artery: No occlusion or stenosis of the origin.      Left common carotid artery: A small portion of the proximal left common carotid   artery is not visualized secondary to streak artifact from contrast within the   adjacent veins. Otherwise, visualized portion of the left common carotid artery   is unremarkable. No stenosis. No dissection.   Left internal carotid artery: No stenosis of the extracranial segment. No   dissection or occlusion.   Left external carotid artery: No occlusion or stenosis of the origin.      Right vertebral artery: No stenosis. No dissection or occlusion.   Left vertebral artery: No stenosis. No dissection or occlusion.     Soft tissues: Normal. No significant soft tissue swelling.     Bones/joints: There is degenerative disc disease of the spine.     Lungs: No focal consolidation of the visualized upper lungs.     IMPRESSION:   No hemodynamically significant stenosis of the arteries of the neck. No   dissection.     REFERENCES:   NASCET CRITERIA. The degree of stenosis in the cervical segment of the internal   carotid artery is based on NASCET criteria. Normal is no stenosis. Mild is less   than 50% stenosis. Moderate  is 50-69% stenosis. Severe is 70% to 99% stenosis.   Total occlusion is no detectable patent lumen.     THIS DOCUMENT HAS BEEN ELECTRONICALLY SIGNED BY VICTOR M NOONAN MD   XR Chest Port 1 View    Narrative    PROCEDURE INFORMATION:   Exam: XR Chest   Exam date and time: 11/12/2024 8:19 PM   Age: 59 years old   Clinical indication: Shortness of breath; Additional info: Short of breath     TECHNIQUE:   Imaging protocol: Radiologic exam of the chest.   Views: 1 view.     COMPARISON:   No prior studies available.     FINDINGS:   Tubes, catheters and devices: Overlying EKG leads.   Lungs: Poor inspiratory effort with secondary hypoventilatory changes and   perihilar/infrahilar vascular crowding. No acute focal dense air space   consolidation or lung parenchymal mass.   Pleural spaces: No pneumothorax. No large right pleural effusion. No large left   pleural effusion.   Heart/Mediastinum: Somewhat elevated left hemidiaphragm with mild right   mediastinal shift. Prominent cardiomediastinal silhouette.   Bones/joints: No acute fracture or neoplastic osseous lesion.       Impression    IMPRESSION:   1.   No active cardiopulmonary disaese.   2.   Remainder of findings described as above.     THIS DOCUMENT HAS BEEN ELECTRONICALLY SIGNED BY JOANNA RODRIGUEZ MD   MR Brain w/o & w Contrast    Narrative    MR BRAIN W/O & W CONTRAST, MR ORBIT W/O & W CONTRAST  11/13/2024 7:43 PM    History: Female, age 59 years, visual changes    Comparison: CT scan of the brain and CTA head neck 11/12/2024    Technique:   MRI BRAIN: Sagittal T1, axial T2, T2 FLAIR, diffusion, gradient echo,  ADC mapping, T1 and 3 plane T1 fat saturation postcontrast 3-D.     MRI orbits: Thin sliced axial T1, T2 fat saturation, T2 and T1  postcontrast fat saturation. Thin slice coronal T1 and T1 postcontrast  fat saturation of the orbits.    Findings:   Ventricles and sulci: Normal in size and shape.    Gray and white matter: Extensive nonenhancing white matter lesions  of  the cerebral hemispheres suggesting small vessel disease.    Cerebellopontine angles: Clear    Extra-axial spaces: Clear.    Enhancement: Normal.    Midline structures: Optic chiasma is unremarkable. Pituitary gland is  unremarkable.  Globes: Normal.  Orbits: Normal. Intraconal and extraconal fat unremarkable.  Extraocular muscles: Normal.  Paranasal sinuses: Normal.  Mastoid air cells: Normal.  Diffusion: Normal.      Impression    Impression:  Scattered, nonspecific white matter changes suggest small vessel  disease without evidence of acute or subacute ischemia.      Normal appearance of the globes, orbits and optic nerves.    JOANNA LOPEZ MD         SYSTEM ID:  [id number]   MR Orbit w/o & w Contrast    Narrative    MR BRAIN W/O & W CONTRAST, MR ORBIT W/O & W CONTRAST  11/13/2024 7:43 PM    History: Female, age 59 years, visual changes    Comparison: CT scan of the brain and CTA head neck 11/12/2024    Technique:   MRI BRAIN: Sagittal T1, axial T2, T2 FLAIR, diffusion, gradient echo,  ADC mapping, T1 and 3 plane T1 fat saturation postcontrast 3-D.     MRI orbits: Thin sliced axial T1, T2 fat saturation, T2 and T1  postcontrast fat saturation. Thin slice coronal T1 and T1 postcontrast  fat saturation of the orbits.    Findings:   Ventricles and sulci: Normal in size and shape.    Gray and white matter: Extensive nonenhancing white matter lesions of  the cerebral hemispheres suggesting small vessel disease.    Cerebellopontine angles: Clear    Extra-axial spaces: Clear.    Enhancement: Normal.    Midline structures: Optic chiasma is unremarkable. Pituitary gland is  unremarkable.  Globes: Normal.  Orbits: Normal. Intraconal and extraconal fat unremarkable.  Extraocular muscles: Normal.  Paranasal sinuses: Normal.  Mastoid air cells: Normal.  Diffusion: Normal.      Impression    Impression:  Scattered, nonspecific white matter changes suggest small vessel  disease without evidence of acute or subacute  ischemia.      Normal appearance of the globes, orbits and optic nerves.    JOANNA LOPEZ MD         SYSTEM ID:  [id number]       Discharge Medications   Current Discharge Medication List        START taking these medications    Details   aspirin 81 MG EC tablet Take 1 tablet (81 mg) by mouth daily.  Qty: 100 tablet, Refills: 0    Associated Diagnoses: Other headache syndrome; Mixed hyperlipidemia; Generalized anxiety disorder      metoprolol succinate ER (TOPROL XL) 25 MG 24 hr tablet Take 1 tablet (25 mg) by mouth daily.  Qty: 30 tablet, Refills: 0    Associated Diagnoses: Essential hypertension; Intractable headache, unspecified chronicity pattern, unspecified headache type           CONTINUE these medications which have NOT CHANGED    Details   DULoxetine (CYMBALTA) 60 MG capsule TAKE 1 CAPSULE(60 MG) BY MOUTH DAILY  Qty: 90 capsule, Refills: 4    Associated Diagnoses: Generalized anxiety disorder      hydrochlorothiazide (HYDRODIURIL) 25 MG tablet TAKE 1 TABLET(25 MG) BY MOUTH EVERY MORNING  Qty: 90 tablet, Refills: 4    Associated Diagnoses: Essential hypertension; Water retention      neomycin-polymyxin-hydrocortisone (CORTISPORIN) 3.5-30578-7 otic suspension Place 4 drops into both ears 3 times daily as needed (ear drainage/itching)  Qty: 10 mL, Refills: 3    Associated Diagnoses: Ear drainage, bilateral      valACYclovir (VALTREX) 1000 mg tablet Take 1 tablet (1,000 mg) by mouth 2 times daily as needed (flare of shingles).  Qty: 20 tablet, Refills: 4    Comments: Renewal only - patient will call for refill  Associated Diagnoses: Herpes zoster with ophthalmic complication, unspecified herpes zoster eye disease      valsartan (DIOVAN) 160 MG tablet TAKE 1 TABLET(160 MG) BY MOUTH EVERY EVENING  Qty: 90 tablet, Refills: 4    Associated Diagnoses: Essential hypertension           Allergies   Allergies   Allergen Reactions    Amoxicillin Swelling     lips    Lisinopril Swelling    Atorvastatin Other (See  Comments)     Bad stomach pain, nausea    Morphine Hives     Itchy rash at time of birth    Paroxetine Nausea

## 2024-11-14 NOTE — PLAN OF CARE
"PRIMARY DIAGNOSIS: TIA  OUTPATIENT/OBSERVATION GOALS TO BE MET BEFORE DISCHARGE:  1. Orthostatic performed: N/A    2. Diagnostic testing complete & at baseline neurologic testing: Yes    3. Cleared by consultants (if involved): Yes        5. Tolerating adequate PO diet and medications: Yes    6. Return to near baseline physical activity or neurologic status: Yes    Discharge Planner Nurse   Safe discharge environment identified: Yes  Barriers to discharge: No       Entered by: Chitra Villanueva RN 11/13/2024 8:11 PM     Please review provider order for any additional goals.   Nurse to notify provider when observation goals have been met and patient is ready for discharge.        /70   Pulse 76   Temp 98.6  F (37  C) (Tympanic)   Resp 18   Ht 1.6 m (5' 3\")   Wt 73.4 kg (161 lb 14.4 oz)   LMP 08/07/2006 (Approximate)   SpO2 96%   BMI 28.68 kg/m      A &O x4,vss, neuro's intact, denies any pain.  "

## 2024-11-14 NOTE — CARE PLAN
PRIMARY DIAGNOSIS: Headache/TIA  OUTPATIENT/OBSERVATION GOALS TO BE MET BEFORE DISCHARGE:  ADLs back to baseline: Yes    Activity and level of assistance: Ambulating independently.    Pain status: Improved-controlled with oral pain medications.    Return to near baseline physical activity: Yes     Discharge Planner Nurse   Safe discharge environment identified: Yes  Barriers to discharge: No       Entered by: Homer Nolen RN 11/13/2024 8:06 PM    Pt A/O. VSS except elevated BP. Denies pain except dull anterior headache. PRN Tylenol given. Neuros intact. Ambulating IND in room. Daughter at bedside.

## 2024-11-21 ENCOUNTER — HOSPITAL ENCOUNTER (EMERGENCY)
Facility: OTHER | Age: 59
Discharge: HOME | End: 2024-11-21
Attending: FAMILY MEDICINE
Payer: COMMERCIAL

## 2024-11-21 VITALS
DIASTOLIC BLOOD PRESSURE: 81 MMHG | OXYGEN SATURATION: 96 % | BODY MASS INDEX: 28.35 KG/M2 | RESPIRATION RATE: 18 BRPM | WEIGHT: 160 LBS | SYSTOLIC BLOOD PRESSURE: 144 MMHG | HEIGHT: 63 IN | TEMPERATURE: 97.2 F | HEART RATE: 66 BPM

## 2024-11-21 DIAGNOSIS — R03.0 ELEVATED BLOOD PRESSURE READING: ICD-10-CM

## 2024-11-21 LAB
ANION GAP SERPL CALCULATED.3IONS-SCNC: 9 MMOL/L (ref 7–15)
BASOPHILS # BLD AUTO: 0.1 10E3/UL (ref 0–0.2)
BASOPHILS NFR BLD AUTO: 1 %
BUN SERPL-MCNC: 10.2 MG/DL (ref 8–23)
CALCIUM SERPL-MCNC: 9.3 MG/DL (ref 8.8–10.4)
CHLORIDE SERPL-SCNC: 105 MMOL/L (ref 98–107)
CREAT SERPL-MCNC: 0.9 MG/DL (ref 0.51–0.95)
EGFRCR SERPLBLD CKD-EPI 2021: 72 ML/MIN/1.73M2
EOSINOPHIL # BLD AUTO: 0.1 10E3/UL (ref 0–0.7)
EOSINOPHIL NFR BLD AUTO: 1 %
ERYTHROCYTE [DISTWIDTH] IN BLOOD BY AUTOMATED COUNT: 13.2 % (ref 10–15)
GLUCOSE SERPL-MCNC: 100 MG/DL (ref 70–99)
HCO3 SERPL-SCNC: 28 MMOL/L (ref 22–29)
HCT VFR BLD AUTO: 39.7 % (ref 35–47)
HGB BLD-MCNC: 13.2 G/DL (ref 11.7–15.7)
HOLD SPECIMEN: NORMAL
IMM GRANULOCYTES # BLD: 0 10E3/UL
IMM GRANULOCYTES NFR BLD: 0 %
LYMPHOCYTES # BLD AUTO: 2.5 10E3/UL (ref 0.8–5.3)
LYMPHOCYTES NFR BLD AUTO: 28 %
MCH RBC QN AUTO: 29.5 PG (ref 26.5–33)
MCHC RBC AUTO-ENTMCNC: 33.2 G/DL (ref 31.5–36.5)
MCV RBC AUTO: 89 FL (ref 78–100)
MONOCYTES # BLD AUTO: 0.7 10E3/UL (ref 0–1.3)
MONOCYTES NFR BLD AUTO: 7 %
NEUTROPHILS # BLD AUTO: 5.6 10E3/UL (ref 1.6–8.3)
NEUTROPHILS NFR BLD AUTO: 63 %
NRBC # BLD AUTO: 0 10E3/UL
NRBC BLD AUTO-RTO: 0 /100
PLATELET # BLD AUTO: 377 10E3/UL (ref 150–450)
POTASSIUM SERPL-SCNC: 3.8 MMOL/L (ref 3.4–5.3)
RBC # BLD AUTO: 4.48 10E6/UL (ref 3.8–5.2)
SODIUM SERPL-SCNC: 142 MMOL/L (ref 135–145)
TROPONIN T SERPL HS-MCNC: 6 NG/L
WBC # BLD AUTO: 8.9 10E3/UL (ref 4–11)

## 2024-11-21 PROCEDURE — 93005 ELECTROCARDIOGRAM TRACING: CPT | Performed by: FAMILY MEDICINE

## 2024-11-21 PROCEDURE — 84484 ASSAY OF TROPONIN QUANT: CPT | Performed by: FAMILY MEDICINE

## 2024-11-21 PROCEDURE — 99284 EMERGENCY DEPT VISIT MOD MDM: CPT | Performed by: FAMILY MEDICINE

## 2024-11-21 PROCEDURE — 250N000013 HC RX MED GY IP 250 OP 250 PS 637: Performed by: FAMILY MEDICINE

## 2024-11-21 PROCEDURE — 80048 BASIC METABOLIC PNL TOTAL CA: CPT | Performed by: FAMILY MEDICINE

## 2024-11-21 PROCEDURE — 93010 ELECTROCARDIOGRAM REPORT: CPT | Performed by: INTERNAL MEDICINE

## 2024-11-21 PROCEDURE — 36415 COLL VENOUS BLD VENIPUNCTURE: CPT | Performed by: FAMILY MEDICINE

## 2024-11-21 PROCEDURE — 82374 ASSAY BLOOD CARBON DIOXIDE: CPT | Performed by: FAMILY MEDICINE

## 2024-11-21 PROCEDURE — 85025 COMPLETE CBC W/AUTO DIFF WBC: CPT | Performed by: FAMILY MEDICINE

## 2024-11-21 RX ORDER — CLONIDINE HYDROCHLORIDE 0.1 MG/1
0.1 TABLET ORAL ONCE
Status: COMPLETED | OUTPATIENT
Start: 2024-11-21 | End: 2024-11-21

## 2024-11-21 RX ADMIN — CLONIDINE HYDROCHLORIDE 0.1 MG: 0.1 TABLET ORAL at 20:08

## 2024-11-21 ASSESSMENT — ACTIVITIES OF DAILY LIVING (ADL)
ADLS_ACUITY_SCORE: 0

## 2024-11-21 ASSESSMENT — COLUMBIA-SUICIDE SEVERITY RATING SCALE - C-SSRS
6. HAVE YOU EVER DONE ANYTHING, STARTED TO DO ANYTHING, OR PREPARED TO DO ANYTHING TO END YOUR LIFE?: NO
2. HAVE YOU ACTUALLY HAD ANY THOUGHTS OF KILLING YOURSELF IN THE PAST MONTH?: NO
1. IN THE PAST MONTH, HAVE YOU WISHED YOU WERE DEAD OR WISHED YOU COULD GO TO SLEEP AND NOT WAKE UP?: NO

## 2024-11-22 LAB
ATRIAL RATE - MUSE: 66 BPM
DIASTOLIC BLOOD PRESSURE - MUSE: NORMAL MMHG
INTERPRETATION ECG - MUSE: NORMAL
P AXIS - MUSE: 48 DEGREES
PR INTERVAL - MUSE: 156 MS
QRS DURATION - MUSE: 96 MS
QT - MUSE: 418 MS
QTC - MUSE: 438 MS
R AXIS - MUSE: 52 DEGREES
SYSTOLIC BLOOD PRESSURE - MUSE: NORMAL MMHG
T AXIS - MUSE: 27 DEGREES
VENTRICULAR RATE- MUSE: 66 BPM

## 2024-11-22 NOTE — DISCHARGE INSTRUCTIONS
Juana    The labs and EKG are all normal this evening.     Call the clinic in the morning and see if they have an answer for you.  If not contact me.    Thank you for choosing our Emergency Department for your care.     You may receive a phone call or letter for a survey about your care in our ED.  Please complete this as this is how we improve care for our patients.     If you have any questions after leaving the ED you can call or text me on my cell phone at [phone].  I am not on call so if I do not answer my phone please leave a message- I will get back to you.  If you are not doing well please return to the ED.     Sincerely,    Dr Car Patten M.D.  Medical Director  [facility] Emergency Department

## 2024-11-22 NOTE — ED TRIAGE NOTES
Pt arrives via private vehicle from home with c/o hypertension for the last couple weeks. Pt was seen in ER, was given metoprolol on top of her statin medication. Pt states today she took double of both meds this morning and again at 1815-2173, with no relief. Pt has followed with primary, was supposed to be hearing back about medication change, and has not yet. Pt is currently having headache with her hypertension.      Triage Assessment (Adult)       Row Name 11/21/24 [id number]          Triage Assessment    Airway WDL WDL        Respiratory WDL    Respiratory WDL WDL        Skin Circulation/Temperature WDL    Skin Circulation/Temperature WDL WDL        Cardiac WDL    Cardiac WDL WDL        Peripheral/Neurovascular WDL    Peripheral Neurovascular WDL WDL        Cognitive/Neuro/Behavioral WDL    Cognitive/Neuro/Behavioral WDL WDL

## 2024-11-22 NOTE — ED PROVIDER NOTES
History     Chief Complaint   Patient presents with    Hypertension     The history is provided by the patient and medical records.     Juana Farrell is a 59 year old female here for concerns about elevated blood pressure. She was seen yesterday in clinic by María Meier for uncontrolled HTN and María was going to recommend a change  in her BP medications. She is taking metoprolol ER 25 mg and valsartan 160 mg each morning and taking hydrochlorothiazide 25 mg at about noon. With her elevated pressures she took a second dose of Valsartan today at 4:45 PM and a second dose of metoprolol at 5:45 PM. Her pressures today were still elevated so she came to the ED.  She denies any symptoms.     Allergies:  Allergies   Allergen Reactions    Amoxicillin Swelling     lips    Lisinopril Swelling    Atorvastatin Other (See Comments)     Bad stomach pain, nausea    Morphine Hives     Itchy rash at time of birth    Paroxetine Nausea       Problem List:    Patient Active Problem List    Diagnosis Date Noted    Other headache syndrome 11/12/2024     Priority: Medium    Tobacco use 08/08/2021     Priority: Medium    Anxiety 08/08/2021     Priority: Medium    Essential hypertension 12/03/2019     Priority: Medium    Mixed hyperlipidemia 10/30/2018     Priority: Medium    Nicotine addiction 01/18/2018     Priority: Medium    Generalized anxiety disorder 01/22/2013     Priority: Medium    Chronic tension headaches 10/03/2012     Priority: Medium        Past Medical History:    Past Medical History:   Diagnosis Date    Acquired hallux valgus of left foot     Anxiety and depression 1995    Benign essential hypertension 12/03/2019    Chronic tension headaches 10/03/2012    Dermatitis     Mixed hyperlipidemia 10/30/2018    Nicotine addiction 01/18/2018    Osteoarthritis     Otitis externa     Shingles 2021       Past Surgical History:    Past Surgical History:   Procedure Laterality Date    BUNIONECTOMY Bilateral 12/2023     Dr Turner     SECTION      2 C-Sections    COLONOSCOPY      within normal limits.    ECTOPIC PREGNANCY SURGERY      Two ectopic pregnancies; with one the left tube removed    HYSTERECTOMY TOTAL ABDOMINAL  2009    Total abdominal hysterectomy by Dr. 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 Carter       Family History:    Family History   Problem Relation Age of Onset    Hypertension Mother     Heart Disease Mother     Heart Failure Mother     Cancer Father         Abernathy's polyposis syndrome with father dying early of colon cancer.   at age 41 of Abernathy's polyposis with resulting colon cancer    No Known Problems Sister     Hypertension Brother     Colon Cancer Brother         Abernathy's    Colon Cancer Brother         Abernathy's    Diabetes Maternal Grandmother         Diabetes,FHMaternal diabetes in the family    No Known Problems Daughter     No Known Problems Daughter     Other - See Comments Other 0        Family history of Abernathy's polyposis syndrome, negative genetic testing       Social History:  Marital Status:   [5]  Social History     Tobacco Use    Smoking status: Former     Current packs/day: 0.00     Average packs/day: 0.3 packs/day for 19.7 years (4.9 ttl pk-yrs)     Types: Cigarettes     Start date: 2003     Quit date: 3/1/2023     Years since quittin.7    Smokeless tobacco: Never    Tobacco comments:     Started smoking at age 38.  Typically 1 pack a day but cutting back - working on quitting and is now down to about 5 per day.    Vaping Use    Vaping status: Never Used   Substance Use Topics    Alcohol use: No     Alcohol/week: 0.0 standard drinks of alcohol     Comment: Alcoholic Drinks/day: rare    Drug use: No     Comment:          Medications:    aspirin 81 MG EC tablet  DULoxetine (CYMBALTA) 60 MG  "capsule  hydrochlorothiazide (HYDRODIURIL) 25 MG tablet  metoprolol succinate ER (TOPROL XL) 25 MG 24 hr tablet  neomycin-polymyxin-hydrocortisone (CORTISPORIN) 3.5-74523-5 otic suspension  valACYclovir (VALTREX) 1000 mg tablet  valsartan (DIOVAN) 160 MG tablet      Review of Systems   All other systems reviewed and are negative.      Physical Exam   BP: (!) 191/120  Pulse: 79  Temp: 97.2  F (36.2  C)  Resp: 16  Height: 160 cm (5' 3\")  Weight: 72.6 kg (160 lb)  SpO2: 98 %      Physical Exam  Vitals and nursing note reviewed.   Constitutional:       General: She is not in acute distress.     Appearance: Normal appearance. She is not ill-appearing.   Cardiovascular:      Rate and Rhythm: Normal rate and regular rhythm.      Pulses: Normal pulses.      Heart sounds: Normal heart sounds.   Pulmonary:      Effort: Pulmonary effort is normal.      Breath sounds: Normal breath sounds.   Musculoskeletal:      Right lower leg: No edema.      Left lower leg: No edema.   Skin:     General: Skin is warm and dry.   Neurological:      General: No focal deficit present.      Mental Status: She is alert and oriented to person, place, and time.       EKG: NSR, rate 66, normal axis     Results for orders placed or performed during the hospital encounter of 11/21/24 (from the past 24 hours)   CBC with platelets differential    Narrative    The following orders were created for panel order CBC with platelets differential.  Procedure                               Abnormality         Status                     ---------                               -----------         ------                     CBC with platelets and d...[[id number]]                      Final result                 Please view results for these tests on the individual orders.   Basic metabolic panel   Result Value Ref Range    Sodium 142 135 - 145 mmol/L    Potassium 3.8 3.4 - 5.3 mmol/L    Chloride 105 98 - 107 mmol/L    Carbon Dioxide (CO2) 28 22 - 29 mmol/L    Anion " Gap 9 7 - 15 mmol/L    Urea Nitrogen 10.2 8.0 - 23.0 mg/dL    Creatinine 0.90 0.51 - 0.95 mg/dL    GFR Estimate 72 >60 mL/min/1.73m2    Calcium 9.3 8.8 - 10.4 mg/dL    Glucose 100 (H) 70 - 99 mg/dL   Troponin T, High Sensitivity   Result Value Ref Range    Troponin T, High Sensitivity 6 <=14 ng/L   [facility] Draw    Narrative    The following orders were created for panel order [place] Draw.  Procedure                               Abnormality         Status                     ---------                               -----------         ------                     Extra Blue Top Tube[[id number]]                              Final result               Extra Red Top Tube[[id number]]                               Final result               Extra Green Top (Lithium...[[id number]]                      Final result               Extra Purple Top Tube[[id number]]                            Final result               Extra Green Top (Lithium...[[id number]]                      Final result                 Please view results for these tests on the individual orders.   CBC with platelets and differential   Result Value Ref Range    WBC Count 8.9 4.0 - 11.0 10e3/uL    RBC Count 4.48 3.80 - 5.20 10e6/uL    Hemoglobin 13.2 11.7 - 15.7 g/dL    Hematocrit 39.7 35.0 - 47.0 %    MCV 89 78 - 100 fL    MCH 29.5 26.5 - 33.0 pg    MCHC 33.2 31.5 - 36.5 g/dL    RDW 13.2 10.0 - 15.0 %    Platelet Count 377 150 - 450 10e3/uL    % Neutrophils 63 %    % Lymphocytes 28 %    % Monocytes 7 %    % Eosinophils 1 %    % Basophils 1 %    % Immature Granulocytes 0 %    NRBCs per 100 WBC 0 <1 /100    Absolute Neutrophils 5.6 1.6 - 8.3 10e3/uL    Absolute Lymphocytes 2.5 0.8 - 5.3 10e3/uL    Absolute Monocytes 0.7 0.0 - 1.3 10e3/uL    Absolute Eosinophils 0.1 0.0 - 0.7 10e3/uL    Absolute Basophils 0.1 0.0 - 0.2 10e3/uL    Absolute Immature Granulocytes 0.0 <=0.4 10e3/uL    Absolute NRBCs 0.0 10e3/uL   Extra Blue Top Tube   Result Value Ref Range    Hold  "Specimen X    Extra Red Top Tube   Result Value Ref Range    Hold Specimen X    Extra Green Top (Lithium Heparin) Tube   Result Value Ref Range    Hold Specimen X    Extra Purple Top Tube   Result Value Ref Range    Hold Specimen X    Extra Green Top (Lithium Heparin) ON ICE   Result Value Ref Range    Hold Specimen X        Medications   cloNIDine (CATAPRES) tablet 0.1 mg (0.1 mg Oral $Given 11/21/24 2008)       Assessments & Plan (with Medical Decision Making)  Juana Farrell is a 59 year old female here for concerns about elevated blood pressure. She was seen yesterday in clinic by María Meier for uncontrolled HTN and María was going to recommend a change  in her BP medications. She is taking metoprolol ER 25 mg and valsartan 160 mg each morning and taking hydrochlorothiazide 25 mg at about noon. With her elevated pressures she took a second dose of Valsartan today at 4:45 PM and a second dose of metoprolol at 5:45 PM. Her pressures today were still elevated so she came to the ED.  She denies any symptoms.   VS in the ED BP (!) 144/81   Pulse 66   Temp 97.2  F (36.2  C)   Resp 18   Ht 1.6 m (5' 3\")   Wt 72.6 kg (160 lb)   LMP 08/07/2006 (Approximate)   SpO2 96%   BMI 28.34 kg/m    Exam shows no concerns.  EKG stable.  Labs show CBC normal, BMP normal, troponin 6.   9:55 PM  We will get her home. Her BP is much better.  No change in meds tonight. I recommend she call the clinic in the morning and see if Dr Loomis has a recommendation for medications.  If they do not get back to her she can call me.  Patient Vitals for the past 24 hrs:   BP Temp Pulse Resp SpO2 Height Weight   11/21/24 2100 (!) 144/81 -- 66 18 96 % -- --   11/21/24 2041 (!) 166/89 -- 63 16 96 % -- --   11/21/24 2000 (!) 171/99 -- 69 18 95 % -- --   11/21/24 1900 (!) 170/102 -- 73 18 97 % -- --   11/21/24 1852 (!) 178/115 -- -- -- -- -- --   11/21/24 1848 (!) 191/120 97.2  F (36.2  C) 79 16 98 % 1.6 m (5' 3\") 72.6 kg (160 lb)    "     I have reviewed the nursing notes.    I have reviewed the findings, diagnosis, plan and need for follow up with the patient.  Medical Decision Making  The patient's presentation was of moderate complexity (a chronic illness mild to moderate exacerbation, progression, or side effect of treatment).    The patient's evaluation involved:  an assessment requiring an independent historian (see separate area of note for details)  review of external note(s) from 2 sources (see separate area of note for details)  ordering and/or review of 3+ test(s) in this encounter (see separate area of note for details)    The patient's management necessitated moderate risk (prescription drug management including medications given in the ED).      Final diagnoses:   Elevated blood pressure reading       11/21/2024   [facility], Lei Varma MD  11/21/24 [id number]

## 2024-11-25 ENCOUNTER — MYC MEDICAL ADVICE (OUTPATIENT)
Dept: INTERNAL MEDICINE | Facility: OTHER | Age: 59
End: 2024-11-25
Payer: COMMERCIAL

## 2024-11-26 NOTE — TELEPHONE ENCOUNTER
Pt's b/p are still elevated but improving.     Currently prescribed Valsartan, Metoprolol, hydrochlorothiazide, and Amlodipine.     Per OV from 11/22:  Juana is a 59-year-old female patient who presents to the clinic today for follow-up of ER visit for hypertension. She has been on blood pressure medications since her early 30s. She has tried different combinations of medications. Lately she has had variable blood pressures ranging from 1 teens systolic to 180s systolic with symptomatic headaches. She is currently taking hydrochlorothiazide 25 mg once daily, metoprolol XL 25 mg once daily, and Diovan 160 mg once daily. I will add amlodipine 5 mg once daily to her regimen. She is to continue to follow low-salt, low caffeine diet. She is to follow-up with me in 3 weeks. Strict return precautions given to patient. She is agreeable to treatment plan.     Seen in ED on 11/21    Per OV from 11/20:  Continue with metoprolol and valsartan- will consider other changes such as increasing metoprolol versus adding amlodipine after discussion with patient's PCP. Her heart rate is stable on current low dose of metoprolol.     Recommend letting the new medication work. Follow up in 3 weeks as instructed.   Any further recommendations?     Routing to provider to review and respond.  Sonam Lin RN on 11/26/2024 at 10:42 AM

## 2024-11-26 NOTE — TELEPHONE ENCOUNTER
Please let her know that her blood pressures are looking better.  These medications take time to build up in her system and work.  I would not recommend making any dose changes at this time. I would like her to continue to keep a log of her blood pressures at home and continue with follow-up visit with me in 3 weeks.

## 2024-12-09 ENCOUNTER — OFFICE VISIT (OUTPATIENT)
Dept: ORTHOPEDICS | Facility: OTHER | Age: 59
End: 2024-12-09
Payer: COMMERCIAL

## 2024-12-09 DIAGNOSIS — M25.511 CHRONIC RIGHT SHOULDER PAIN: Primary | ICD-10-CM

## 2024-12-09 DIAGNOSIS — G89.29 CHRONIC RIGHT SHOULDER PAIN: Primary | ICD-10-CM

## 2024-12-09 PROCEDURE — 250N000011 HC RX IP 250 OP 636

## 2024-12-09 PROCEDURE — 250N000009 HC RX 250

## 2024-12-09 RX ORDER — LIDOCAINE HYDROCHLORIDE 10 MG/ML
0.5 INJECTION, SOLUTION EPIDURAL; INFILTRATION; INTRACAUDAL; PERINEURAL ONCE
Status: DISCONTINUED | OUTPATIENT
Start: 2024-12-09 | End: 2024-12-09

## 2024-12-09 RX ORDER — TRIAMCINOLONE ACETONIDE 40 MG/ML
20 INJECTION, SUSPENSION INTRA-ARTICULAR; INTRAMUSCULAR ONCE
Status: COMPLETED | OUTPATIENT
Start: 2024-12-09 | End: 2024-12-09

## 2024-12-09 RX ORDER — LIDOCAINE HYDROCHLORIDE 10 MG/ML
0.5 INJECTION, SOLUTION INFILTRATION; PERINEURAL ONCE
Status: COMPLETED | OUTPATIENT
Start: 2024-12-09 | End: 2024-12-09

## 2024-12-09 RX ADMIN — LIDOCAINE HYDROCHLORIDE 0.5 ML: 10 INJECTION, SOLUTION INFILTRATION; PERINEURAL at 10:51

## 2024-12-09 RX ADMIN — TRIAMCINOLONE ACETONIDE 20 MG: 40 INJECTION, SUSPENSION INTRA-ARTICULAR; INTRAMUSCULAR at 10:50

## 2024-12-09 NOTE — PROGRESS NOTES
SUBJECTIVE:  Juana is a 59-year-old female with a history of right AC joint arthritis.  She has had a couple of injections into the right AC joint with good success.  Her last being on 6/17/2024.  She states this worked quite well up until the last month when she began to once again developed a dull achy throbbing pain in the right shoulder aggravated by lifting and reaching overhead.  Today, she would like to repeat a corticosteroid injection of the right AC joint.  She denies any new injuries to the right shoulder.    OBJECTIVE:  59-year-old female alert and oriented in no acute distress.  She has full range of motion of the right shoulder with some discomfort above 90 degrees.  She is tender to palpation at the AC joint.  She is nontender to palpation of Codman's point or the bicipital groove.  She has a positive cross body adduction test.  She is otherwise neurovascularly intact.    ASSESSMENT/PLAN:  1. Chronic right shoulder pain (Primary)  1-year-old female with right AC joint arthritis.  She would like to proceed with a corticosteroid injection of the right AC joint today which she tolerated well and noticed some relief immediately following the injection.  Will plan to follow-up on an as-needed basis.  We discussed the nature of a distal clavicle excision and subacromial decompression as well as recovery.  A steroid injection was performed at right AC joint using 1% plain Lidocaine and 20 mg of Kenalog. This was well tolerated.   - triamcinolone (KENALOG-40) injection 20 mg  - lidocaine 1 % injection 0.5 mL

## 2025-03-01 ENCOUNTER — HEALTH MAINTENANCE LETTER (OUTPATIENT)
Age: 60
End: 2025-03-01

## 2025-03-24 ENCOUNTER — TRANSCRIBE ORDERS (OUTPATIENT)
Dept: OTHER | Age: 60
End: 2025-03-24

## 2025-03-24 ENCOUNTER — MEDICAL CORRESPONDENCE (OUTPATIENT)
Dept: HEALTH INFORMATION MANAGEMENT | Facility: CLINIC | Age: 60
End: 2025-03-24

## 2025-03-24 DIAGNOSIS — H53.2 DIPLOPIA: Primary | ICD-10-CM

## 2025-06-01 ASSESSMENT — ANXIETY QUESTIONNAIRES
GAD7 TOTAL SCORE: 4
6. BECOMING EASILY ANNOYED OR IRRITABLE: SEVERAL DAYS
2. NOT BEING ABLE TO STOP OR CONTROL WORRYING: SEVERAL DAYS
7. FEELING AFRAID AS IF SOMETHING AWFUL MIGHT HAPPEN: NOT AT ALL
5. BEING SO RESTLESS THAT IT IS HARD TO SIT STILL: NOT AT ALL
4. TROUBLE RELAXING: SEVERAL DAYS
7. FEELING AFRAID AS IF SOMETHING AWFUL MIGHT HAPPEN: NOT AT ALL
8. IF YOU CHECKED OFF ANY PROBLEMS, HOW DIFFICULT HAVE THESE MADE IT FOR YOU TO DO YOUR WORK, TAKE CARE OF THINGS AT HOME, OR GET ALONG WITH OTHER PEOPLE?: SOMEWHAT DIFFICULT
IF YOU CHECKED OFF ANY PROBLEMS ON THIS QUESTIONNAIRE, HOW DIFFICULT HAVE THESE PROBLEMS MADE IT FOR YOU TO DO YOUR WORK, TAKE CARE OF THINGS AT HOME, OR GET ALONG WITH OTHER PEOPLE: SOMEWHAT DIFFICULT
1. FEELING NERVOUS, ANXIOUS, OR ON EDGE: NOT AT ALL
GAD7 TOTAL SCORE: 4
GAD7 TOTAL SCORE: 4
3. WORRYING TOO MUCH ABOUT DIFFERENT THINGS: SEVERAL DAYS

## 2025-06-01 ASSESSMENT — PATIENT HEALTH QUESTIONNAIRE - PHQ9
SUM OF ALL RESPONSES TO PHQ QUESTIONS 1-9: 0
SUM OF ALL RESPONSES TO PHQ QUESTIONS 1-9: 0

## 2025-06-01 ASSESSMENT — PAIN SCALES - PAIN ENJOYMENT GENERAL ACTIVITY SCALE (PEG)
PEG_TOTALSCORE: 0
INTERFERED_GENERAL_ACTIVITY: 0 - DOES NOT INTERFERE
INTERFERED_GENERAL_ACTIVITY: 0
AVG_PAIN_PASTWEEK: 0
INTERFERED_ENJOYMENT_LIFE: 0
AVG_PAIN_PASTWEEK: 0 - NO PAIN
PEG_TOTALSCORE: 0
INTERFERED_ENJOYMENT_LIFE: 0 - DOES NOT INTERFERE

## 2025-06-02 ENCOUNTER — OFFICE VISIT (OUTPATIENT)
Dept: INTERNAL MEDICINE | Facility: OTHER | Age: 60
End: 2025-06-02
Payer: COMMERCIAL

## 2025-06-02 VITALS
TEMPERATURE: 96.6 F | BODY MASS INDEX: 28.17 KG/M2 | DIASTOLIC BLOOD PRESSURE: 84 MMHG | HEART RATE: 80 BPM | HEIGHT: 63 IN | OXYGEN SATURATION: 99 % | SYSTOLIC BLOOD PRESSURE: 124 MMHG | WEIGHT: 159 LBS | RESPIRATION RATE: 18 BRPM

## 2025-06-02 DIAGNOSIS — I10 ESSENTIAL HYPERTENSION: Primary | ICD-10-CM

## 2025-06-02 DIAGNOSIS — R60.0 BILATERAL LOWER EXTREMITY EDEMA: ICD-10-CM

## 2025-06-02 DIAGNOSIS — N18.2 CKD (CHRONIC KIDNEY DISEASE) STAGE 2, GFR 60-89 ML/MIN: ICD-10-CM

## 2025-06-02 DIAGNOSIS — R60.9 WATER RETENTION: ICD-10-CM

## 2025-06-02 RX ORDER — HYDROCHLOROTHIAZIDE 25 MG/1
TABLET ORAL
Qty: 180 TABLET | Refills: 4 | Status: SHIPPED | OUTPATIENT
Start: 2025-06-02

## 2025-06-02 ASSESSMENT — PAIN SCALES - GENERAL: PAINLEVEL_OUTOF10: NO PAIN (0)

## 2025-06-02 ASSESSMENT — ENCOUNTER SYMPTOMS
PALPITATIONS: 0
SHORTNESS OF BREATH: 0
COUGH: 0

## 2025-06-02 NOTE — PROGRESS NOTES
"  Assessment & Plan     ICD-10-CM    1. Essential hypertension  I10 hydrochlorothiazide (HYDRODIURIL) 25 MG tablet      2. Water retention  R60.9 hydrochlorothiazide (HYDRODIURIL) 25 MG tablet      3. Bilateral lower extremity edema  R60.0       4. CKD (chronic kidney disease) stage 2, GFR 60-89 ml/min  N18.2          We discussed patient's lower leg edema, potential causes, and medication and conservative options. We discussed her hypertension and medications. She will continue on current medications at this time and will add an extra 12.5 mg of hydrochlorothiazide daily as needed on days she notes there is increased lower leg edema. She will return to clinic if she is needing to do this more than twice a week to check her electrolytes. No edema is noted on physical exam today. We discussed low salt diet, remaining physically active, considering compression stockings.     -Kidney function reviewed and is appropriate on current medication regimen- will monitor further if she is requiring increased hydrochlorothiazide use on a regular basis.     The longitudinal plan of care for the diagnosis(es)/condition(s) as documented were addressed during this visit. Due to the added complexity in care, I will continue to support Juana in the subsequent management and with ongoing continuity of care.               BMI  Estimated body mass index is 28.17 kg/m  as calculated from the following:    Height as of this encounter: 1.6 m (5' 3\").    Weight as of this encounter: 72.1 kg (159 lb).           No follow-ups on file.      CYRUS Rangel [facility]    Review of Systems   Respiratory:  Negative for cough and shortness of breath.    Cardiovascular:  Positive for peripheral edema. Negative for chest pain and palpitations.   All other systems reviewed and are negative.        Subjective   Juana is a 59 year old, presenting for the following health issues:  Recheck Medication (Discuss Blood " "Pressure Medications )    Patient presents to clinic to discuss her blood pressure/intermittent lower leg swelling. She has been following with cardiology regarding medications to control her blood pressure. She reduced her carvedilol dose as this made her dizzy and increased her amlodipine on her own to 10 mg twice daily to compensate.     She was evaluated on 5/1/25 and had increased lower leg swelling due to this. She was instructed to reduce amlodipine dose back to 5 mg once daily. She states the swelling has improved but she will have occasional days that she experiences lower leg swelling towards the end of the day. She is hesitant to make further medications adjustments as her blood pressures are now very well controlled. Her lower leg swelling does not happen daily, she does not have shortness of breath, she has considered compression stockings but feels this is difficult to wear in the summer.         History of Present Illness       She eats 2-3 servings of fruits and vegetables daily.She consumes 1 sweetened beverage(s) daily.She exercises with enough effort to increase her heart rate 10 to 19 minutes per day.  She exercises with enough effort to increase her heart rate 3 or less days per week.   She is taking medications regularly.                      Objective    /84 (BP Location: Right arm, Patient Position: Chair, Cuff Size: Adult Regular)   Pulse 80   Temp (!) 96.6  F (35.9  C) (Temporal)   Resp 18   Ht 1.6 m (5' 3\")   Wt 72.1 kg (159 lb)   LMP 08/07/2006 (Approximate)   SpO2 99%   BMI 28.17 kg/m    Body mass index is 28.17 kg/m .  Physical Exam  Cardiovascular:      Rate and Rhythm: Normal rate and regular rhythm.      Pulses: Normal pulses.      Heart sounds: Normal heart sounds. No murmur heard.  Pulmonary:      Effort: Pulmonary effort is normal. No respiratory distress.      Breath sounds: Normal breath sounds. No wheezing or rhonchi.   Musculoskeletal:         General: No " swelling.      Right lower leg: No edema.      Left lower leg: No edema.                    Signed Electronically by: CYRUS Rangel CNP

## 2025-06-02 NOTE — PATIENT INSTRUCTIONS
You may take an extra 1/2 tablet of hydrochlorothiazide daily as needed for lower leg swelling- follow up in one month if you are needing to take this twice or more weekly.

## 2025-06-02 NOTE — NURSING NOTE
"Chief Complaint   Patient presents with    Recheck Medication     Discuss Blood Pressure Medications        Initial /84 (BP Location: Right arm, Patient Position: Chair, Cuff Size: Adult Regular)   Pulse 80   Temp (!) 96.6  F (35.9  C) (Temporal)   Resp 18   Ht 1.6 m (5' 3\")   Wt 72.1 kg (159 lb)   LMP 08/07/2006 (Approximate)   SpO2 99%   BMI 28.17 kg/m   Estimated body mass index is 28.17 kg/m  as calculated from the following:    Height as of this encounter: 1.6 m (5' 3\").    Weight as of this encounter: 72.1 kg (159 lb).      Medication Reconciliation: Complete.       Gaviota Dunn LPN on 6/2/2025 at 9:04 AM     "

## 2025-06-26 ENCOUNTER — OFFICE VISIT (OUTPATIENT)
Dept: OPHTHALMOLOGY | Facility: CLINIC | Age: 60
End: 2025-06-26
Attending: STUDENT IN AN ORGANIZED HEALTH CARE EDUCATION/TRAINING PROGRAM
Payer: COMMERCIAL

## 2025-06-26 DIAGNOSIS — H53.2 DIPLOPIA: ICD-10-CM

## 2025-06-26 DIAGNOSIS — H53.10 SUBJECTIVE VISUAL DISTURBANCE: ICD-10-CM

## 2025-06-26 DIAGNOSIS — H50.34 INTERMITTENT EXOTROPIA, ALTERNATING: ICD-10-CM

## 2025-06-26 DIAGNOSIS — H53.2 DOUBLE VISION: Primary | ICD-10-CM

## 2025-06-26 PROCEDURE — 99204 OFFICE O/P NEW MOD 45 MIN: CPT | Mod: GC | Performed by: OPHTHALMOLOGY

## 2025-06-26 PROCEDURE — 92060 SENSORIMOTOR EXAMINATION: CPT | Mod: 26 | Performed by: OPHTHALMOLOGY

## 2025-06-26 PROCEDURE — 99213 OFFICE O/P EST LOW 20 MIN: CPT | Performed by: OPHTHALMOLOGY

## 2025-06-26 PROCEDURE — 92060 SENSORIMOTOR EXAMINATION: CPT | Performed by: OPHTHALMOLOGY

## 2025-06-26 ASSESSMENT — CONF VISUAL FIELD
OS_INFERIOR_NASAL_RESTRICTION: 0
OD_SUPERIOR_NASAL_RESTRICTION: 0
OD_INFERIOR_TEMPORAL_RESTRICTION: 0
OS_SUPERIOR_TEMPORAL_RESTRICTION: 0
OS_INFERIOR_TEMPORAL_RESTRICTION: 0
OS_NORMAL: 1
OD_NORMAL: 1
OD_SUPERIOR_TEMPORAL_RESTRICTION: 0
OD_INFERIOR_NASAL_RESTRICTION: 0
OS_SUPERIOR_NASAL_RESTRICTION: 0
METHOD: COUNTING FINGERS

## 2025-06-26 ASSESSMENT — REFRACTION_WEARINGRX
OS_ADD: +2.50
OS_CYLINDER: +1.25
OS_AXIS: 005
OS_AXIS: 005
OD_CYLINDER: +1.25
OD_ADD: +2.50
OS_CYLINDER: +2.25
OD_AXIS: 170
OD_ADD: +2.50
OS_ADD: +2.50
OD_SPHERE: +3.75
OD_SPHERE: +3.50
OS_SPHERE: +3.50
OS_SPHERE: +3.50
OD_AXIS: 175
OD_CYLINDER: +1.00

## 2025-06-26 ASSESSMENT — TONOMETRY
OS_IOP_MMHG: 17
OD_IOP_MMHG: 15
IOP_METHOD: ICARE

## 2025-06-26 ASSESSMENT — VISUAL ACUITY
OS_CC: 20/20
METHOD: SNELLEN - LINEAR
CORRECTION_TYPE: GLASSES
OS_CC+: -1
OD_CC: 20/20

## 2025-06-26 ASSESSMENT — EXTERNAL EXAM - LEFT EYE: OS_EXAM: NORMAL

## 2025-06-26 ASSESSMENT — CUP TO DISC RATIO
OS_RATIO: 0.25
OD_RATIO: 0.35

## 2025-06-26 ASSESSMENT — SLIT LAMP EXAM - LIDS
COMMENTS: NORMAL
COMMENTS: NORMAL

## 2025-06-26 ASSESSMENT — EXTERNAL EXAM - RIGHT EYE: OD_EXAM: NORMAL

## 2025-06-26 NOTE — LETTER
2025    RE: Juana Farrell  : 1965  MRN: [id number]    Dear Dr. Ley    Thank you for referring your patient, Juana Farrell, to my neuro-ophthalmology clinic recently.  After a thorough neuro-ophthalmic history and examination, I came to the following conclusions:     1. Decompensated intermittent exotropia     Patient intolerant of ground in prism glasses.  I discussed with the patient the risks, benefits, and alternatives of strabismus surgery and patient wishes to consider her options. She was made aware that the goal of strabismus surgery would be to allow her more stable single binocular vision.    The patient will let us know later if she wishes to proceed with strabismus surgery.    Juana Farrell is a pleasant 60 year old female who presents to my neuro-ophthalmology clinic today having been referred by Dr. Ley for intermittent pressure sensation and double vision.    HPI:    60 F pmhx HTN and TBI () presenting with intermittent double vision. This has been on and off for about one year and started when the blood pressure started to increase. The double vision will randomly happen without any apparent pattern. She was seen in 2024 and at that time was diagnosed with decompensated exophoria and given prism glasses but these caused her to lose depth perception and she would trip and fall.     Patient has been closing right eye to see more clearly for several years.    Denies change in vision, flashes, floaters, curtain-like defects, and pain with eye movements.   Endorses history of headaches during stress 2/10.   Denies fevers, chills, weight loss, scalp tenderness, jaw claudication, myalgias, weakness  Past ocular history of zoster ophthalmicus in the left eye, otherwise no pohx.    Measurements from 2024 strabismus exam-Dr. Burgos  25 exotropia at distance and 35 exotropia at near    Independent historians:  Patient    Review of outside  testing:  MRI brain and orbit w/ contrast 11/12/24:  Impression:  Scattered, nonspecific white matter changes suggest small vessel disease without evidence of acute or subacute ischemia. Normal appearance of the globes, orbits and optic nerves.    Review of outside clinical notes:    2/6/25 visit with Dr. 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 Symptomatic for intermittent double vision at near and pressure sensation around right eye for the past several months. EOM's are intact in both eyes. Subjectively notes improvement to clarity and comfort with BI introduced at distance and near. Patient's PCP is in process of ordering an MRI to rule out other causes of pressure sensation. Good ocular health both eyes.   (H25.13) Age-related nuclear cataract of both eyes  (H52.03, H52.203, H52.4) Hyperopia of both eyes with astigmatism and presbyopia    Plan:  Released updated glasses rx with BI prism, discussed adaptation period. RTC with any issues or concerns. May consider referral to Dr. Conti in future if not adequately controlled by prism or if symptoms increase in frequency.   Cataract surgery not indicated at this time. ADL's are unaffected. Will monitor until functional vision is impacted.     Past medical history:    Patient Active Problem List   Diagnosis    Chronic tension headaches    Generalized anxiety disorder    Nicotine addiction    Mixed hyperlipidemia    Hypertension    Tobacco use    Anxiety    Other headache syndrome     Patient has a current medication list which includes the following prescription(s): amlodipine, duloxetine, hydrochlorothiazide, neomycin-polymyxin-hydrocortisone, valsartan, and valacyclovir.. List is confirmed today.    Family history: Brother with lazy eye, patched as a child. Mother with macular degeneration.  Patient's family history includes Cancer in her father; Colon Cancer in her  brother and brother; Diabetes in her maternal grandmother; Heart Disease in her mother; Heart Failure in her mother; Hypertension in her brother and mother; No Known Problems in her daughter, daughter, and sister; Other - See Comments (age of onset: 0) in an other family member.     Social history:  Patient  reports that she quit smoking about 2 years ago. Her smoking use included cigarettes. She started smoking about 22 years ago. She has a 19.7 pack-year smoking history. She has been exposed to tobacco smoke. She has never used smokeless tobacco. She reports that she does not drink alcohol and does not use drugs.     Occupation: part-time admin work    Exam:  Vision 20/20 OU, color plates 11/11, PERRLA, Pressures 15 and 17. Please see epic chart for complete exam   Cranial nerves V1-3, 7,8,9,11, and 12 were normal bilaterally.     Tests ordered and interpreted today:  Sensorimotor examination revealed essentially full motility in both eyes.  There was a relatively concomitant poorly controlled intermittent exotropia measuring 12-18 prism diopters depending upon gaze position.  Patient was able to fuse 1-12 prism diopters base in and had unstable fusion with 14 prism diopters base in.    We discussed in detail the risks, benefits, and alternatives of eye muscle correction surgery including the very rare risk of death or serious morbidity from a general anesthesia complication and the rare risk of severe vision loss in the operative eye(s) secondary to retinal detachment or endophthalmitis.  We discussed more likely sub-optimal outcomes including the unanticipated need for additional strabismus surgery.  Finally the patient was aware that prisms glasses may be required to optimize single vision following surgery.    After a thorough discussion of these risks, the patient decided to consider strabismus surgery.  The surgical plan would be as follows:     1. Eye muscle correction, left eye. Possible use of adjustable  suture, left eye.    Follow-up 1 week after strabismus surgery.    Plan to operate at: [facility]  Prism free glasses for adjustment: patient has prism free glasses    Blood thinners: None  GLP1 agonists (must be off for > 7 days): None    Patient will contact us if she wishes to proceed with surgery.      Again, thank you for trusting me with the care of your patient.  For further exam details, please feel free to contact our office for additional records.  If you wish to contact me regarding this patient please email me at [email or web address] or give my clinic a call to arrange a phone conversation.    Sincerely,    Gabe Gonzalez MD  , Neuro-Ophthalmology and Adult Strabismus Surgery  [facility] and [facility] Chair in Neuro-Ophthalmology  Department of Ophthalmology and Visual Neurosciences  [facility]    DX: intermittent exotropia

## 2025-06-26 NOTE — PROGRESS NOTES
1. Decompensated Exophoria  2. Alternating Intermittent Exotropia  3. Microvascular ischemic disease of the brain parenchyma    Patient intolerant of ground in prism glasses.    Juana Farrell is a pleasant 60 year old female who presents to my neuro-ophthalmology clinic today having been referred by Dr. Ley for intermittent pressure sensation and double vision.    HPI:    60 F pmhx HTN and TBI (2020) presenting with intermittent double vision. This has been on and off for about one year and started when the blood pressure started to increase. The double vision will randomly happen without any apparent pattern. She was seen in 09/2024 and at that time was diagnosed with decompensated exophoria and given prism glasses but these caused her to lose depth perception and she would trip and fall.     Patient has been closing right eye to see more clearly for several years.    Denies change in vision, flashes, floaters, curtain-like defects, and pain with eye movements.   Endorses history of headaches during stress 2/10.   Denies fevers, chills, weight loss, scalp tenderness, jaw claudication, myalgias, weakness  Past ocular history of zoster ophthalmicus in the left eye, otherwise no pohx.    Measurements from 09/2024 strabismus exam-Dr. Burgos  25 exotropia at distance and 35 exotropia at near      The patient is presenting with an acute illness that potentially poses a threat to life or bodily function (vision).    Independent historians:  Patient    Review of outside testing:  MRI brain and orbit w/ contrast 11/12/24:  Impression:  Scattered, nonspecific white matter changes suggest small vessel disease without evidence of acute or subacute ischemia. Normal appearance of the globes, orbits and optic nerves.    My interpretation performed today of outside testing:  I have independently reviewed *** performed ***. ***No abnormal FLAIR hyperintensity or enhancement in the orbits or elsewhere along the  visual pathways.    Review of outside clinical notes:    2/6/25 visit with Dr. 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 Symptomatic for intermittent double vision at near and pressure sensation around right eye for the past several months. EOM's are intact in both eyes. Subjectively notes improvement to clarity and comfort with BI introduced at distance and near. Patient's PCP is in process of ordering an MRI to rule out other causes of pressure sensation. Good ocular health both eyes.   (H25.13) Age-related nuclear cataract of both eyes  (H52.03, H52.203, H52.4) Hyperopia of both eyes with astigmatism and presbyopia    Plan:  Released updated glasses rx with BI prism, discussed adaptation period. RTC with any issues or concerns. May consider referral to Dr. Conti in future if not adequately controlled by prism or if symptoms increase in frequency.   Cataract surgery not indicated at this time. ADL's are unaffected. Will monitor until functional vision is impacted.     Past medical history:    Patient Active Problem List   Diagnosis    Chronic tension headaches    Generalized anxiety disorder    Nicotine addiction    Mixed hyperlipidemia    Hypertension    Tobacco use    Anxiety    Other headache syndrome     Patient has a current medication list which includes the following prescription(s): amlodipine, duloxetine, hydrochlorothiazide, neomycin-polymyxin-hydrocortisone, valsartan, and valacyclovir.. List is confirmed today.    Family history: Brother with lazy eye, patched as a child. Mother with macular degeneration.  Patient's family history includes Cancer in her father; Colon Cancer in her brother and brother; Diabetes in her maternal grandmother; Heart Disease in her mother; Heart Failure in her mother; Hypertension in her brother and mother; No Known Problems in her daughter, daughter, and sister; Other - See  Comments (age of onset: 0) in an other family member.     Social history:  Patient  reports that she quit smoking about 2 years ago. Her smoking use included cigarettes. She started smoking about 22 years ago. She has a 19.7 pack-year smoking history. She has been exposed to tobacco smoke. She has never used smokeless tobacco. She reports that she does not drink alcohol and does not use drugs.     Occupation: part-time admin work    Exam:  Vision 20/20 OU, color plates 11/11, PERRLA, Pressures 15 and 17, EOMs full and painless. Anterior segment mild dry eyes and cataracts. Fundus exam unremarkable. Strabismus exam showing XT 18 in primary.     Remaining cranial nerve exam (V, VII-XII): V1-V3 sensation intact and equal bilaterally. Muscles of facial expression symmetric and no facial droop. Hearing intact to conversational tone. Palate elevates symmetrically. Tongue protrudes midline and side-to-side. Shoulder elevation and sternocleidomastoid strength intact.    Tests ordered and interpreted today:    None performed    We discussed in detail the risks, benefits, and alternatives of eye muscle correction surgery including the very rare risk of death or serious morbidity from a general anesthesia complication and the rare risk of severe vision loss in the operative eye(s) secondary to retinal detachment or endophthalmitis.  We discussed more likely sub-optimal outcomes including the unanticipated need for additional strabismus surgery.  Finally the patient was aware that prisms glasses may be required to optimize single vision following surgery.    After a thorough discussion of these risks, the patient decided to consider strabismus surgery.  The surgical plan would be as follows:     1. Eye muscle correction, left eye. Possible use of adjustable suture, left eye.    Follow-up 1 week after strabismus surgery.    Plan to operate at: [facility]  Prism free glasses for adjustment: patient has prism free glasses    Blood  thinners: None  GLP1 agonists (must be off for > 7 days): None    Patient will contact us if she wishes to proceed with surgery.         Michael Mccain MD  PGY-3, Ophthalmology  [facility]

## (undated) RX ORDER — TRIAMCINOLONE ACETONIDE 40 MG/ML
INJECTION, SUSPENSION INTRA-ARTICULAR; INTRAMUSCULAR
Status: DISPENSED
Start: 2024-12-09

## (undated) RX ORDER — CLONIDINE HYDROCHLORIDE 0.1 MG/1
TABLET ORAL
Status: DISPENSED
Start: 2024-11-21

## (undated) RX ORDER — TRIAMCINOLONE ACETONIDE 40 MG/ML
INJECTION, SUSPENSION INTRA-ARTICULAR; INTRAMUSCULAR
Status: DISPENSED
Start: 2023-04-03

## (undated) RX ORDER — LIDOCAINE HYDROCHLORIDE 10 MG/ML
INJECTION, SOLUTION EPIDURAL; INFILTRATION; INTRACAUDAL; PERINEURAL
Status: DISPENSED
Start: 2023-04-03

## (undated) RX ORDER — LIDOCAINE HYDROCHLORIDE 10 MG/ML
INJECTION, SOLUTION INFILTRATION; PERINEURAL
Status: DISPENSED
Start: 2024-12-09

## (undated) RX ORDER — LIDOCAINE HYDROCHLORIDE 10 MG/ML
INJECTION, SOLUTION EPIDURAL; INFILTRATION; INTRACAUDAL; PERINEURAL
Status: DISPENSED
Start: 2024-06-17

## (undated) RX ORDER — TRIAMCINOLONE ACETONIDE 40 MG/ML
INJECTION, SUSPENSION INTRA-ARTICULAR; INTRAMUSCULAR
Status: DISPENSED
Start: 2024-06-17